# Patient Record
Sex: MALE | Race: OTHER | Employment: FULL TIME | URBAN - METROPOLITAN AREA
[De-identification: names, ages, dates, MRNs, and addresses within clinical notes are randomized per-mention and may not be internally consistent; named-entity substitution may affect disease eponyms.]

---

## 2017-10-18 ENCOUNTER — HOSPITAL ENCOUNTER (EMERGENCY)
Age: 42
Discharge: HOME | End: 2017-10-18
Attending: EMERGENCY MEDICINE
Payer: SELF-PAY

## 2017-10-18 VITALS
RESPIRATION RATE: 16 BRPM | HEIGHT: 73 IN | SYSTOLIC BLOOD PRESSURE: 127 MMHG | HEART RATE: 82 BPM | TEMPERATURE: 98.2 F | DIASTOLIC BLOOD PRESSURE: 83 MMHG | WEIGHT: 167.5 LBS | BODY MASS INDEX: 22.2 KG/M2 | OXYGEN SATURATION: 95 %

## 2017-10-18 DIAGNOSIS — F11.20 HEROIN ADDICTION (HCC): Primary | ICD-10-CM

## 2017-10-18 PROCEDURE — 90791 PSYCH DIAGNOSTIC EVALUATION: CPT

## 2017-10-18 PROCEDURE — 99283 EMERGENCY DEPT VISIT LOW MDM: CPT
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
## 2017-10-18 NOTE — DISCHARGE INSTRUCTIONS
Learning About Opioid Use Disorder  What is opioid use disorder? Opioids are strong pain medicines. Examples include hydrocodone, oxycodone, fentanyl, and morphine. Heroin is an example of an illegal opioid. Opioid use disorder is using these drugs in a way that keeps you from living the life you want. Your use is out of control, and it harms you and your relationships. Even if you don't see bad effects in your life, it can be dangerous to use opioids in a way that your doctor didn't prescribe. Taking too much of an opioid can cause:  · Trouble breathing. · Low blood pressure. · A low heart rate. · A coma. · Death. Some people develop problems with opioids after they get a prescription from a doctor. Others buy these drugs illegally. Many people with this disorder, and sometimes their families, feel embarrassed or ashamed. Don't let these feelings  the way of getting treatment. Remember that the disorder can happen to anyone who uses opioids, no matter what the reason. What are the symptoms? You may have opioid use disorder if two or more of the following are true:  · You use larger amounts of the drug than you ever meant to. Or you've been using it for a longer period of time than you ever meant to. · You can't cut down or control your use. Or you constantly wish you could cut down. · You spend a lot of time getting or using the drug, or recovering from the effects. · You have strong cravings for the drug. · You can no longer do your main jobs at work, at school, or at home. · You keep using even though your drug use hurts your relationships. · You have stopped doing important activities because of your drug use. · You use drugs in situations where doing so is dangerous. · You keep using the drug even though you know it is causing health problems. · You need more and more of the drug to get the same effect, or you get less effect from the same amount over time.  This is called tolerance. · You can't stop using the drug without having uncomfortable symptoms. This is called withdrawal.  How is opioid use disorder treated? Treatment usually includes medicines, group therapy, one or more types of counseling, and drug education. Sometimes medicines are used to help you quit. They may help to control cravings, ease withdrawal symptoms, and prevent relapse. This treatment is called medication-assisted treatment, or MAT. During MAT, you take a substitute drug (usually methadone or buprenorphine) in place of the opioid you were using. Most people take the medicine for months or years as a part of the treatment, along with therapy or counseling. Treatment focuses on more than drugs. It helps you cope with the anger, frustration, sadness, and disappointment that often happen when a person tries to stop using drugs. Treatment also looks at other parts of your life. For example, how are your relationships with friends and family? What's going on at school and work? Do you have health problems? What is your living situation? Treatment helps you find and manage problems. It helps you take control of your life so you don't have to depend on drugs. A drug problem affects your whole family. Family counseling often is part of treatment. Urgent treatment for an overdose  Naloxone is a medicine that reverses the effects of an overdose. If you take it or someone gives it to you soon enough after an overdose, it can save your life. Naloxone comes in a rescue kit you can carry with you. Ask your doctor or pharmacist about having a naloxone rescue kit on hand. Follow-up care is a key part of your treatment and safety. Be sure to make and go to all appointments, and call your doctor if you are having problems. It's also a good idea to know your test results and keep a list of the medicines you take. Where can you learn more? Go to http://sourav-abdi.info/.   Enter C648 in the search box to learn more about \"Learning About Opioid Use Disorder. \"  Current as of: February 3, 2017  Content Version: 11.3  © 1955-4763 [employer]. Care instructions adapted under license by [employer] (which disclaims liability or warranty for this information). If you have questions about a medical condition or this instruction, always ask your healthcare professional. [place] [address] any warranty or liability for your use of this information.

## 2017-10-18 NOTE — ED TRIAGE NOTES
I have been using Heroin for 6 months and I need detox. Denies suicidal /homicidal. Last use yesterday.

## 2019-06-08 ENCOUNTER — APPOINTMENT (OUTPATIENT)
Dept: GENERAL RADIOLOGY | Age: 44
End: 2019-06-08
Attending: PHYSICIAN ASSISTANT
Payer: COMMERCIAL

## 2019-06-08 ENCOUNTER — HOSPITAL ENCOUNTER (EMERGENCY)
Age: 44
Discharge: HOME | End: 2019-06-09
Attending: STUDENT IN AN ORGANIZED HEALTH CARE EDUCATION/TRAINING PROGRAM | Admitting: STUDENT IN AN ORGANIZED HEALTH CARE EDUCATION/TRAINING PROGRAM
Payer: COMMERCIAL

## 2019-06-08 DIAGNOSIS — S39.012A BACK STRAIN, INITIAL ENCOUNTER: Primary | ICD-10-CM

## 2019-06-08 PROCEDURE — 99282 EMERGENCY DEPT VISIT SF MDM: CPT

## 2019-06-08 PROCEDURE — 96372 THER/PROPH/DIAG INJ SC/IM: CPT

## 2019-06-08 PROCEDURE — 74011250636 HC RX REV CODE- 250/636: Performed by: PHYSICIAN ASSISTANT

## 2019-06-08 PROCEDURE — 72100 X-RAY EXAM L-S SPINE 2/3 VWS: CPT

## 2019-06-08 RX ORDER — KETOROLAC TROMETHAMINE 30 MG/ML
15 INJECTION, SOLUTION INTRAMUSCULAR; INTRAVENOUS
Status: COMPLETED | OUTPATIENT
Start: 2019-06-08 | End: 2019-06-08

## 2019-06-08 RX ORDER — DIAZEPAM 10 MG/2ML
2 INJECTION INTRAMUSCULAR
Status: COMPLETED | OUTPATIENT
Start: 2019-06-08 | End: 2019-06-08

## 2019-06-08 RX ADMIN — KETOROLAC TROMETHAMINE 15 MG: 30 INJECTION, SOLUTION INTRAMUSCULAR at 23:37

## 2019-06-08 RX ADMIN — Medication 2 MG: at 23:37

## 2019-06-08 NOTE — LETTER
MarleneAsmita Nathaly 53 
[address] [place] [id number] 
[phone] Work/School Note Date: 6/8/2019 To Whom It May concern: 
 
Roque Isaac was seen and treated today in the emergency room by the following provider(s): 
Attending Provider: Sena Valenzuela MD 
Physician Assistant: KAYLA Gonzalez. Roque Isaac may return to work on Wednesday; SOONER IF SYMPTOMS IMPROVE. Sincerely, Sheryle Maker, PA

## 2019-06-09 VITALS
OXYGEN SATURATION: 97 % | BODY MASS INDEX: 24.02 KG/M2 | SYSTOLIC BLOOD PRESSURE: 149 MMHG | WEIGHT: 181.22 LBS | HEIGHT: 73 IN | HEART RATE: 62 BPM | TEMPERATURE: 98.1 F | DIASTOLIC BLOOD PRESSURE: 89 MMHG | RESPIRATION RATE: 16 BRPM

## 2019-06-09 RX ORDER — DIAZEPAM 5 MG/1
5 TABLET ORAL
Qty: 15 TAB | Refills: 0 | Status: SHIPPED | OUTPATIENT
Start: 2019-06-09 | End: 2019-09-09

## 2019-06-09 RX ORDER — NAPROXEN 500 MG/1
500 TABLET ORAL
Qty: 20 TAB | Refills: 0 | Status: SHIPPED | OUTPATIENT
Start: 2019-06-09 | End: 2019-09-09

## 2019-06-09 NOTE — DISCHARGE INSTRUCTIONS
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
## 2019-07-09 ENCOUNTER — HOSPITAL ENCOUNTER (OUTPATIENT)
Dept: MRI IMAGING | Age: 44
Discharge: HOME | End: 2019-07-09
Attending: CHIROPRACTOR
Payer: COMMERCIAL

## 2019-07-09 DIAGNOSIS — M54.50 LOW BACK PAIN RADIATING TO LEFT LEG: ICD-10-CM

## 2019-07-09 DIAGNOSIS — M79.605 LOW BACK PAIN RADIATING TO LEFT LEG: ICD-10-CM

## 2019-07-09 PROCEDURE — 72148 MRI LUMBAR SPINE W/O DYE: CPT

## 2019-09-09 ENCOUNTER — HOSPITAL ENCOUNTER (OUTPATIENT)
Dept: PREADMISSION TESTING | Age: 44
Discharge: HOME | End: 2019-09-09
Payer: COMMERCIAL

## 2019-09-09 VITALS
RESPIRATION RATE: 20 BRPM | SYSTOLIC BLOOD PRESSURE: 177 MMHG | DIASTOLIC BLOOD PRESSURE: 111 MMHG | OXYGEN SATURATION: 96 % | TEMPERATURE: 97 F | WEIGHT: 183.5 LBS | HEIGHT: 73 IN | BODY MASS INDEX: 24.32 KG/M2 | HEART RATE: 77 BPM

## 2019-09-09 LAB
25(OH)D3 SERPL-MCNC: 30.4 NG/ML (ref 30–100)
ABO + RH BLD: NORMAL
ALBUMIN SERPL-MCNC: 3.8 G/DL (ref 3.5–5)
ALBUMIN/GLOB SERPL: 1.2 {RATIO} (ref 1.1–2.2)
ALP SERPL-CCNC: 98 U/L (ref 45–117)
ALT SERPL-CCNC: 21 U/L (ref 12–78)
ANION GAP SERPL CALC-SCNC: 1 MMOL/L (ref 5–15)
APPEARANCE UR: CLEAR
APTT PPP: 26.1 SEC (ref 22.1–32)
AST SERPL-CCNC: 13 U/L (ref 15–37)
ATRIAL RATE: 63 BPM
BACTERIA URNS QL MICRO: NEGATIVE /HPF
BASOPHILS # BLD: 0 K/UL (ref 0–0.1)
BASOPHILS NFR BLD: 0 % (ref 0–1)
BILIRUB SERPL-MCNC: 0.3 MG/DL (ref 0.2–1)
BILIRUB UR QL CFM: NEGATIVE
BLOOD GROUP ANTIBODIES SERPL: NORMAL
BUN SERPL-MCNC: 12 MG/DL (ref 6–20)
BUN/CREAT SERPL: 11 (ref 12–20)
CALCIUM SERPL-MCNC: 9.2 MG/DL (ref 8.5–10.1)
CALCULATED P AXIS, ECG09: 64 DEGREES
CALCULATED R AXIS, ECG10: 68 DEGREES
CALCULATED T AXIS, ECG11: 50 DEGREES
CHLORIDE SERPL-SCNC: 109 MMOL/L (ref 97–108)
CO2 SERPL-SCNC: 29 MMOL/L (ref 21–32)
COLOR UR: ABNORMAL
CREAT SERPL-MCNC: 1.1 MG/DL (ref 0.7–1.3)
DIAGNOSIS, 93000: NORMAL
DIFFERENTIAL METHOD BLD: NORMAL
EOSINOPHIL # BLD: 0.2 K/UL (ref 0–0.4)
EOSINOPHIL NFR BLD: 2 % (ref 0–7)
EPITH CASTS URNS QL MICRO: ABNORMAL /LPF
ERYTHROCYTE [DISTWIDTH] IN BLOOD BY AUTOMATED COUNT: 14.3 % (ref 11.5–14.5)
EST. AVERAGE GLUCOSE BLD GHB EST-MCNC: 111 MG/DL
GLOBULIN SER CALC-MCNC: 3.2 G/DL (ref 2–4)
GLUCOSE SERPL-MCNC: 72 MG/DL (ref 65–100)
GLUCOSE UR STRIP.AUTO-MCNC: NEGATIVE MG/DL
HBA1C MFR BLD: 5.5 % (ref 4.2–6.3)
HCT VFR BLD AUTO: 47.8 % (ref 36.6–50.3)
HGB BLD-MCNC: 15.9 G/DL (ref 12.1–17)
HGB UR QL STRIP: NEGATIVE
HYALINE CASTS URNS QL MICRO: ABNORMAL /LPF (ref 0–5)
IMM GRANULOCYTES # BLD AUTO: 0 K/UL (ref 0–0.04)
IMM GRANULOCYTES NFR BLD AUTO: 0 % (ref 0–0.5)
INR PPP: 1 (ref 0.9–1.1)
KETONES UR QL STRIP.AUTO: NEGATIVE MG/DL
LEUKOCYTE ESTERASE UR QL STRIP.AUTO: ABNORMAL
LYMPHOCYTES # BLD: 2.4 K/UL (ref 0.8–3.5)
LYMPHOCYTES NFR BLD: 32 % (ref 12–49)
MCH RBC QN AUTO: 29.2 PG (ref 26–34)
MCHC RBC AUTO-ENTMCNC: 33.3 G/DL (ref 30–36.5)
MCV RBC AUTO: 87.9 FL (ref 80–99)
MONOCYTES # BLD: 0.4 K/UL (ref 0–1)
MONOCYTES NFR BLD: 6 % (ref 5–13)
NEUTS SEG # BLD: 4.3 K/UL (ref 1.8–8)
NEUTS SEG NFR BLD: 60 % (ref 32–75)
NITRITE UR QL STRIP.AUTO: NEGATIVE
NRBC # BLD: 0 K/UL (ref 0–0.01)
NRBC BLD-RTO: 0 PER 100 WBC
P-R INTERVAL, ECG05: 144 MS
PH UR STRIP: 6 [PH] (ref 5–8)
PLATELET # BLD AUTO: 184 K/UL (ref 150–400)
PMV BLD AUTO: 11.8 FL (ref 8.9–12.9)
POTASSIUM SERPL-SCNC: 4.7 MMOL/L (ref 3.5–5.1)
PROT SERPL-MCNC: 7 G/DL (ref 6.4–8.2)
PROT UR STRIP-MCNC: NEGATIVE MG/DL
PROTHROMBIN TIME: 10.6 SEC (ref 9–11.1)
Q-T INTERVAL, ECG07: 450 MS
QRS DURATION, ECG06: 166 MS
QTC CALCULATION (BEZET), ECG08: 460 MS
RBC # BLD AUTO: 5.44 M/UL (ref 4.1–5.7)
RBC #/AREA URNS HPF: ABNORMAL /HPF (ref 0–5)
SODIUM SERPL-SCNC: 139 MMOL/L (ref 136–145)
SP GR UR REFRACTOMETRY: 1.03 (ref 1–1.03)
SPECIMEN EXP DATE BLD: NORMAL
THERAPEUTIC RANGE,PTTT: NORMAL SECS (ref 58–77)
UA: UC IF INDICATED,UAUC: ABNORMAL
UROBILINOGEN UR QL STRIP.AUTO: 1 EU/DL (ref 0.2–1)
VENTRICULAR RATE, ECG03: 63 BPM
WBC # BLD AUTO: 7.4 K/UL (ref 4.1–11.1)
WBC URNS QL MICRO: ABNORMAL /HPF (ref 0–4)

## 2019-09-09 PROCEDURE — 85025 COMPLETE CBC W/AUTO DIFF WBC: CPT

## 2019-09-09 PROCEDURE — 85610 PROTHROMBIN TIME: CPT

## 2019-09-09 PROCEDURE — 81001 URINALYSIS AUTO W/SCOPE: CPT

## 2019-09-09 PROCEDURE — 86900 BLOOD TYPING SEROLOGIC ABO: CPT

## 2019-09-09 PROCEDURE — 80053 COMPREHEN METABOLIC PANEL: CPT

## 2019-09-09 PROCEDURE — 93005 ELECTROCARDIOGRAM TRACING: CPT

## 2019-09-09 PROCEDURE — 85730 THROMBOPLASTIN TIME PARTIAL: CPT

## 2019-09-09 PROCEDURE — 83036 HEMOGLOBIN GLYCOSYLATED A1C: CPT

## 2019-09-09 PROCEDURE — 87086 URINE CULTURE/COLONY COUNT: CPT

## 2019-09-09 PROCEDURE — 82306 VITAMIN D 25 HYDROXY: CPT

## 2019-09-09 PROCEDURE — 36415 COLL VENOUS BLD VENIPUNCTURE: CPT

## 2019-09-09 RX ORDER — MELOXICAM 15 MG/1
15 TABLET ORAL DAILY
COMMUNITY
End: 2019-09-10

## 2019-09-09 RX ORDER — GABAPENTIN 300 MG/1
300 CAPSULE ORAL DAILY
COMMUNITY

## 2019-09-09 RX ORDER — GABAPENTIN 300 MG/1
600 CAPSULE ORAL
COMMUNITY

## 2019-09-09 RX ORDER — CHOLECALCIFEROL (VITAMIN D3) 125 MCG
440 CAPSULE ORAL
COMMUNITY
End: 2019-09-10

## 2019-09-09 NOTE — PERIOP NOTES
At PAT appt bp #1 @ 1110 am-171/111 hr 77, #2 156/102 hr 76. Repeated x3 @ 1130. bp 155/97 hr 75. bp #4 per pt 's request at completion of /107 @ 12noon. Advised pt to go to ED today or call PCP for appt re: elevated bp ASAP. Pt states pcp out of town until 9/13/19. He tried calling office for a rx for chantix for smoke cessation. Advise pt to go to a patient first. Says he was unable to pay another bill. Informed Jannie (supv) re: interaction and elevated bp x4. Was able get an appt with Dr Crystal Royal on 9/10/19 @ 2:30 pm for bp eval. Information provided to patient prior to leaving [facility]. Pt agrees to appt for 9/10.  DOS 9/17/19

## 2019-09-09 NOTE — PERIOP NOTES
[address], [address] [facility]   MAIN OR                                  [phone]   MAIN PRE OP                          [phone]                                                                                AMBULATORY PRE OP          [phone]  PRE-ADMISSION TESTING    [phone]   Surgery Date:   Tuesday 9/17/19          Is surgery arrival time given by surgeon? NO  If [facility] staff will call you Monday 9/16/19  between 3 and 7pm the day before your surgery with your arrival time. (If your surgery is on a Monday, we will call you the Friday before.)    Call [phone] after 7pm Monday-Friday if you did not receive your arrival time. INSTRUCTIONS BEFORE YOUR SURGERY   When You  Arrive Arrive at the [address]nd [address] [facility] on the day of your surgery  Have your insurance card, photo ID, and any copayment (if needed)   Food   and   Drink NO food or drink after midnight the night before surgery    This means NO water, gum, mints, coffee, juice, etc.  No alcohol (beer, wine, liquor) 24 hours before and after surgery   Medications to   TAKE   Morning of Surgery MEDICATIONS TO TAKE THE MORNING OF SURGERY WITH A SIP OF WATER:    gabapentin   Medications  To  STOP      7 days before surgery  Non-Steroidal anti-inflammatory Drugs (NSAID's): for example, Ibuprofen (Advil, Motrin), Naproxen (Aleve)   Aspirin, if taking for pain    Herbal supplements, vitamins, and fish oil   Other: mobic     Blood  Thinners  If you take  Aspirin, Plavix, Coumadin, or any blood-thinning or anti-blood clot medicine, talk to the doctor who prescribed the medications for pre-operative instructions.    Bathing Clothing  Jewelry  Valuables       If you shower the morning of surgery, please do not apply anything to your skin (lotions, powders, deodorant)   Follow all special bath instructions (for spine surgeries)   Do not shave or trim anywhere 24 hours before surgery   Wear your hair loose or down; no pony-tails, buns, or metal hair clips   Wear loose, comfortable, clean clothes   Wear glasses instead of contacts   Leave money, valuables, and jewelry, including body piercings, at home   Going Home - or Spending the Night  SAME-DAY SURGERY: You must have a responsible adult drive you home and stay with you 24 hours after surgery   ADMITS: If your doctor is keeping you in the hospital after surgery, leave personal belongings/luggage in your car until you have a hospital room number. Hospital discharge time is 12 noon  Drivers must be here before 12 noon unless you are told differently   Special Instructions Special Instructions:  · Use Chlorhexidine Care Fusion wash and sponges 3 days prior to surgery as instructed. · Incentive spirometer given with instructions to practice at home and bring back to the hospital on the day of surgery. · [facility] will contact you if your Hemoglobin A1C is greater than 7.5. · Pain pamphlet and Call Don't Fall reminder reviewed with patient. ·  parking is complimentary Monday - Friday 7 am - 5 pm  · Bring PTA Medication list day of surgery with the last doses taken documented   · Do not bring medication bottles the day of surgery     Follow all instructions so your surgery wont be cancelled. Please, be on time. If a situation occurs and you are delayed the day of surgery, call [phone] or [phone]. If your physical condition changes (like a fever, cold, flu, etc.) call your surgeon. The patient was contacted  in person. Home medication reviewed and verified during PAT appointment. The patient verbalizes understanding of all instructions and does not  need reinforcement.

## 2019-09-10 ENCOUNTER — HOSPITAL ENCOUNTER (OUTPATIENT)
Dept: GENERAL RADIOLOGY | Age: 44
Discharge: HOME | End: 2019-09-10
Payer: COMMERCIAL

## 2019-09-10 ENCOUNTER — OFFICE VISIT (OUTPATIENT)
Dept: FAMILY MEDICINE CLINIC | Age: 44
End: 2019-09-10

## 2019-09-10 VITALS
SYSTOLIC BLOOD PRESSURE: 162 MMHG | HEIGHT: 73 IN | BODY MASS INDEX: 24.27 KG/M2 | DIASTOLIC BLOOD PRESSURE: 104 MMHG | WEIGHT: 183.1 LBS | OXYGEN SATURATION: 100 % | TEMPERATURE: 98 F | HEART RATE: 82 BPM | RESPIRATION RATE: 18 BRPM

## 2019-09-10 DIAGNOSIS — I10 ACCELERATED HYPERTENSION: ICD-10-CM

## 2019-09-10 DIAGNOSIS — M54.5 CHRONIC LOW BACK PAIN, UNSPECIFIED BACK PAIN LATERALITY, WITH SCIATICA PRESENCE UNSPECIFIED: ICD-10-CM

## 2019-09-10 DIAGNOSIS — R94.31 ABNORMAL EKG: ICD-10-CM

## 2019-09-10 DIAGNOSIS — R05.3 CHRONIC COUGH: ICD-10-CM

## 2019-09-10 DIAGNOSIS — M51.26 LUMBAR DISC HERNIATION: ICD-10-CM

## 2019-09-10 DIAGNOSIS — Z72.0 TOBACCO ABUSE: ICD-10-CM

## 2019-09-10 DIAGNOSIS — Z01.818 PREOPERATIVE EVALUATION TO RULE OUT SURGICAL CONTRAINDICATION: Primary | ICD-10-CM

## 2019-09-10 DIAGNOSIS — G89.29 CHRONIC LOW BACK PAIN, UNSPECIFIED BACK PAIN LATERALITY, WITH SCIATICA PRESENCE UNSPECIFIED: ICD-10-CM

## 2019-09-10 DIAGNOSIS — J43.9 PULMONARY EMPHYSEMA, UNSPECIFIED EMPHYSEMA TYPE (HCC): ICD-10-CM

## 2019-09-10 DIAGNOSIS — E05.00 GRAVES DISEASE: ICD-10-CM

## 2019-09-10 LAB
BACTERIA SPEC CULT: NORMAL
CC UR VC: NORMAL
SERVICE CMNT-IMP: NORMAL

## 2019-09-10 PROCEDURE — 71046 X-RAY EXAM CHEST 2 VIEWS: CPT

## 2019-09-10 NOTE — Clinical Note
MAXIM--Dr Frank Salgado your patient was seen for a preop eval today and requires further optimization and workup before surgeryDr Kev--you were previously this patient's PCP and he intends to continue to follow with you based on my visitPlease contact me with any concernsSnow Guthrie [facility]09/10/19 5:08 PM

## 2019-09-10 NOTE — PROGRESS NOTES
Chief Complaint   Patient presents with    Pre-op Exam     1. Have you been to the ER, urgent care clinic since your last visit? Hospitalized since your last visit? No    2. Have you seen or consulted any other health care providers outside of the [address] [place] [address] since your last visit? Include any pap smears or colon screening.  No
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
## 2019-09-11 LAB — TSH SERPL DL<=0.005 MIU/L-ACNC: 0.55 UIU/ML (ref 0.45–4.5)

## 2019-09-11 NOTE — PROGRESS NOTES
Called and spoke with pt, and he has been advised and states understanding of results and agrees with plan. Pt agrees to follow up with Dr. Andie Bennett.

## 2019-09-11 NOTE — PROGRESS NOTES
Findings on xray show bullous disease which is most likely related to smoking and emphysema. No acute process causing change in cough.  Again, please having patient follow up with his PCP, Dr Addi Ramirez

## 2019-09-12 ENCOUNTER — HOSPITAL ENCOUNTER (OUTPATIENT)
Dept: NON INVASIVE DIAGNOSTICS | Age: 44
Discharge: HOME | End: 2019-09-12
Attending: FAMILY MEDICINE
Payer: COMMERCIAL

## 2019-09-12 ENCOUNTER — TELEPHONE (OUTPATIENT)
Dept: FAMILY MEDICINE CLINIC | Age: 44
End: 2019-09-12

## 2019-09-12 VITALS
WEIGHT: 182.98 LBS | BODY MASS INDEX: 24.25 KG/M2 | HEIGHT: 73 IN | SYSTOLIC BLOOD PRESSURE: 166 MMHG | DIASTOLIC BLOOD PRESSURE: 104 MMHG

## 2019-09-12 DIAGNOSIS — R94.31 ABNORMAL EKG: ICD-10-CM

## 2019-09-12 LAB
BACTERIA SPEC CULT: ABNORMAL
BACTERIA SPEC CULT: ABNORMAL
ECHO AO ROOT DIAM: 4.49 CM
ECHO AV AREA PEAK VELOCITY: 3.5 CM2
ECHO AV AREA/BSA PEAK VELOCITY: 1.7 CM2/M2
ECHO AV PEAK GRADIENT: 2.9 MMHG
ECHO AV PEAK VELOCITY: 85.09 CM/S
ECHO EST RA PRESSURE: 3 MMHG
ECHO LA AREA 4C: 16.6 CM2
ECHO LA MAJOR AXIS: 3.18 CM
ECHO LA TO AORTIC ROOT RATIO: 0.71
ECHO LA VOL 2C: 56.34 ML (ref 18–58)
ECHO LA VOL 4C: 39.39 ML (ref 18–58)
ECHO LA VOL BP: 50.3 ML (ref 18–58)
ECHO LA VOL/BSA BIPLANE: 24.28 ML/M2 (ref 16–28)
ECHO LA VOLUME INDEX A2C: 27.19 ML/M2 (ref 16–28)
ECHO LA VOLUME INDEX A4C: 19.01 ML/M2 (ref 16–28)
ECHO LV E' LATERAL VELOCITY: 5.07 CENTIMETER/SECOND
ECHO LV E' SEPTAL VELOCITY: 7 CENTIMETER/SECOND
ECHO LV EDV A2C: 94.1 ML
ECHO LV EDV A4C: 123.3 ML
ECHO LV EDV BP: 112.7 ML (ref 67–155)
ECHO LV EDV INDEX A4C: 59.5 ML/M2
ECHO LV EDV INDEX BP: 54.4 ML/M2
ECHO LV EDV NDEX A2C: 45.4 ML/M2
ECHO LV EJECTION FRACTION A2C: 58 %
ECHO LV EJECTION FRACTION A4C: 41 %
ECHO LV EJECTION FRACTION BIPLANE: 47.2 % (ref 55–100)
ECHO LV ESV A2C: 39.9 ML
ECHO LV ESV A4C: 72.4 ML
ECHO LV ESV BP: 59.5 ML (ref 22–58)
ECHO LV ESV INDEX A2C: 19.3 ML/M2
ECHO LV ESV INDEX A4C: 34.9 ML/M2
ECHO LV ESV INDEX BP: 28.7 ML/M2
ECHO LV INTERNAL DIMENSION DIASTOLIC: 5.74 CM (ref 4.2–5.9)
ECHO LV INTERNAL DIMENSION SYSTOLIC: 4.7 CM
ECHO LV IVSD: 1.02 CM (ref 0.6–1)
ECHO LV MASS 2D: 204.7 G (ref 88–224)
ECHO LV MASS INDEX 2D: 98.8 G/M2 (ref 49–115)
ECHO LV POSTERIOR WALL DIASTOLIC: 0.6 CM (ref 0.6–1)
ECHO LVOT DIAM: 2.46 CM
ECHO LVOT PEAK GRADIENT: 1.6 MMHG
ECHO LVOT PEAK VELOCITY: 62.7 CM/S
ECHO MV A VELOCITY: 50.19 CM/S
ECHO MV AREA PHT: 5.9 CM2
ECHO MV E DECELERATION TIME (DT): 128.3 MS
ECHO MV E VELOCITY: 23.32 CM/S
ECHO MV E/A RATIO: 0.46
ECHO MV PRESSURE HALF TIME (PHT): 37.2 MS
ECHO MV REGURGITANT PEAK GRADIENT: 116 MMHG
ECHO MV REGURGITANT PEAK VELOCITY: 538.4 CM/S
ECHO PULMONARY ARTERY SYSTOLIC PRESSURE (PASP): 26.4 MMHG
ECHO PV MAX VELOCITY: 76.57 CM/S
ECHO PV PEAK GRADIENT: 2.3 MMHG
ECHO RIGHT VENTRICULAR SYSTOLIC PRESSURE (RVSP): 26.4 MMHG
ECHO RV INTERNAL DIMENSION: 4.38 CM
ECHO RV TAPSE: 1.78 CM (ref 1.5–2)
ECHO TV REGURGITANT MAX VELOCITY: 241.77 CM/S
ECHO TV REGURGITANT PEAK GRADIENT: 23.4 MMHG
SERVICE CMNT-IMP: ABNORMAL

## 2019-09-12 PROCEDURE — 93306 TTE W/DOPPLER COMPLETE: CPT

## 2019-09-12 NOTE — PROGRESS NOTES
Please notify the patient that I did review his echocardiogram.  He has diminished filling function and diminished pumping function in his heart, both are mild. He has a mildly dilated aortic root, he has no pulmonary hypertension and just trace mitral regurg which is not concerning  I believe that he should again follow-up with his primary care doctor, Dr. Nely Lynn.

## 2019-09-12 NOTE — TELEPHONE ENCOUNTER
----- Message from Cj Donato MD sent at 9/12/2019 12:21 PM EDT -----  Please notify the patient that I did review his echocardiogram.  He has diminished filling function and diminished pumping function in his heart, both are mild. He has a mildly dilated aortic root, he has no pulmonary hypertension and just trace mitral regurg which is not concerning  I believe that he should again follow-up with his primary care doctor, Dr. Noé Saul.

## 2019-09-12 NOTE — PERIOP NOTES
[facility] office aware of BP issues per Dr. Augustine Siddiqui. +MSSA nasal swab verified by calling lab - final result. Result faxed to Dr. Dayanara Khan office via EMR and hard copy with treatment plan and reminder that NP not in PAT this week and will need to be treated by their office.

## 2019-09-12 NOTE — TELEPHONE ENCOUNTER
----- Message from Di Hawk MD sent at 9/11/2019 12:14 PM EDT -----  Please notify pt his thyroid test was normal--again recommend f/u with Dr Migue Judge, his PCP

## 2019-09-12 NOTE — TELEPHONE ENCOUNTER
Pt returned call, was advised of results and agrees to plan, but has concerns because he just left Dr. Villalobos Cable office, bp was 160/104 and 150/90, was advised if bp is ok on Monday he can get surgery, noting he has to purchase home bp cuff to record his readings, and will  today. Pt advised his doctor will clear him for surgery if bp is ok and surgeon's office will be notified. Pt agrees to plan.  Trell

## 2019-09-16 NOTE — PERIOP NOTES
Call placed to Dr. Emma Rocha office, patient does not currently have an appointment scheduled at their office today for BP follow up. LM for patient to verify BP follow up appointment for today. Received a call from patient who states that he purchased a home BP cuff and that his last reading was 155/112, patient instructed to contact PCP to schedule follow up appointment for today.

## 2019-09-16 NOTE — PERIOP NOTES
Called the office of Dr. Courtney Cox, the patient's PCP, requesting a copy of today's OV note be faxed to the Main pre-op. Left a VM for Nathan Buckley nurse requesting this. Left a VM for Amanda Haney at Dr. Marley Garcia office informing her of the patient's continued issues with his BP, and the attempt to obtain the last OV note.   DOS: 9/17/2019

## 2019-09-16 NOTE — PERIOP NOTES
Called the patient's PCP office at [phone] inquiring if the patient called to make a follow up appointment for a BP recheck. Per the , he has an appointment today at 1430.   DOS: 9/17/2019

## 2019-10-01 NOTE — PERIOP NOTES
Reinaldo Santiago from Dr. Zenaida Marr office called. No PAT appt needed unless H&P is more than 30 days from DOS. I called Dr. Angelita Gray office to request last OVN to follow up on BP control. H&P will be in date for DOS    Notes received from Dr. Aravind Kingsley for BP follow up and physical. LM at Dr. Zenaida Marr office to notify them we have necessary documents.

## 2019-10-01 NOTE — PERIOP NOTES
Spoke with patient. Has CHG wash for 3 nights prior to surgery. Has not been treated for MSSA in nares.  I will follow up with NP.

## 2019-10-04 ENCOUNTER — ANESTHESIA EVENT (OUTPATIENT)
Dept: SURGERY | Age: 44
End: 2019-10-04
Payer: COMMERCIAL

## 2019-10-04 RX ORDER — MUPIROCIN 20 MG/G
OINTMENT TOPICAL 2 TIMES DAILY
Qty: 22 G | Refills: 0 | Status: SHIPPED | OUTPATIENT
Start: 2019-10-04 | End: 2019-10-09

## 2019-10-04 NOTE — PROGRESS NOTES
Notification of Positive MSSA and Treatment Ordered by Preadmission Testing Nurse Practitioner      Patient Name:  Vikas Dave  MRN: [id number]  : 1975    Surgeon: Esteban Farias  Date of surgery: 10/7/19  Allergies: No Known Allergies    MRSA results: All Micro Results     Procedure Component Value Units Date/Time    MRSA CULTURE NARES [[id number]]  (Abnormal) Collected:  19 1200    Order Status:  Completed Specimen:  Nares Updated:  19 1809     Special Requests: NO SPECIAL REQUESTS        Culture result:       MRSA NOT PRESENT. Apparent Staphylococus aureus (not MRSA noted). Screening of patient nares for MRSA is for surveillance purposes and, if positive, to facilitate isolation considerations in high risk settings. It is not intended for automatic decolonization interventions per se as regimens are not sufficiently effective to warrant routine use.           CULTURE, URINE [[id number]] Collected:  19 1200    Order Status:  Completed Specimen:  Urine Updated:  09/10/19 5947     Special Requests: --        NO SPECIAL REQUESTS  Reflexed from [id number]       [place] Count <1,000 CFU/ML        Culture result: NO GROWTH 1 DAY             Treatment ordered: Bactroban ointment 2%- apply intranasal twice daily for 5 days    Date patient notified of results / treatment prescribed: 10/4/19 via phone call    The patient was instructed to add medication and date they began treatment to their \"Prior to Admission Medication\" list given to them in [address], [place]

## 2019-10-07 ENCOUNTER — HOSPITAL ENCOUNTER (OUTPATIENT)
Age: 44
Setting detail: OUTPATIENT SURGERY
Discharge: HOME | End: 2019-10-07
Attending: ORTHOPAEDIC SURGERY | Admitting: ORTHOPAEDIC SURGERY
Payer: COMMERCIAL

## 2019-10-07 ENCOUNTER — APPOINTMENT (OUTPATIENT)
Dept: GENERAL RADIOLOGY | Age: 44
End: 2019-10-07
Attending: ORTHOPAEDIC SURGERY
Payer: COMMERCIAL

## 2019-10-07 ENCOUNTER — ANESTHESIA (OUTPATIENT)
Dept: SURGERY | Age: 44
End: 2019-10-07
Payer: COMMERCIAL

## 2019-10-07 VITALS
BODY MASS INDEX: 24.25 KG/M2 | OXYGEN SATURATION: 94 % | DIASTOLIC BLOOD PRESSURE: 79 MMHG | WEIGHT: 182.98 LBS | RESPIRATION RATE: 11 BRPM | SYSTOLIC BLOOD PRESSURE: 115 MMHG | HEART RATE: 75 BPM | TEMPERATURE: 98 F | HEIGHT: 73 IN

## 2019-10-07 PROCEDURE — 77030013708 HC HNDPC SUC IRR PULS STRY –B: Performed by: ORTHOPAEDIC SURGERY

## 2019-10-07 PROCEDURE — 74011250636 HC RX REV CODE- 250/636: Performed by: ORTHOPAEDIC SURGERY

## 2019-10-07 PROCEDURE — 86900 BLOOD TYPING SEROLOGIC ABO: CPT

## 2019-10-07 PROCEDURE — 77030026438 HC STYL ET INTUB CARD -A: Performed by: ANESTHESIOLOGY

## 2019-10-07 PROCEDURE — 74011000250 HC RX REV CODE- 250: Performed by: ORTHOPAEDIC SURGERY

## 2019-10-07 PROCEDURE — 77030019908 HC STETH ESOPH SIMS -A: Performed by: ANESTHESIOLOGY

## 2019-10-07 PROCEDURE — 77030018836 HC SOL IRR NACL ICUM -A: Performed by: ORTHOPAEDIC SURGERY

## 2019-10-07 PROCEDURE — 76060000034 HC ANESTHESIA 1.5 TO 2 HR: Performed by: ORTHOPAEDIC SURGERY

## 2019-10-07 PROCEDURE — 77030031139 HC SUT VCRL2 J&J -A: Performed by: ORTHOPAEDIC SURGERY

## 2019-10-07 PROCEDURE — 74011250636 HC RX REV CODE- 250/636: Performed by: NURSE ANESTHETIST, CERTIFIED REGISTERED

## 2019-10-07 PROCEDURE — 77030014647 HC SEAL FBRN TISSL BAXT -D: Performed by: ORTHOPAEDIC SURGERY

## 2019-10-07 PROCEDURE — 77030040356 HC CORD BPLR FRCP COVD -A: Performed by: ORTHOPAEDIC SURGERY

## 2019-10-07 PROCEDURE — 77030002933 HC SUT MCRYL J&J -A: Performed by: ORTHOPAEDIC SURGERY

## 2019-10-07 PROCEDURE — 77030004391 HC BUR FLUT MEDT -C: Performed by: ORTHOPAEDIC SURGERY

## 2019-10-07 PROCEDURE — 76010000162 HC OR TIME 1.5 TO 2 HR INTENSV-TIER 1: Performed by: ORTHOPAEDIC SURGERY

## 2019-10-07 PROCEDURE — 77030037134 HC WRAP COMPR BACK THER SOLM -B

## 2019-10-07 PROCEDURE — 77030040922 HC BLNKT HYPOTHRM STRY -A

## 2019-10-07 PROCEDURE — 77030003666 HC NDL SPINAL BD -A: Performed by: ORTHOPAEDIC SURGERY

## 2019-10-07 PROCEDURE — 77030018723 HC ELCTRD BLD COVD -A: Performed by: ORTHOPAEDIC SURGERY

## 2019-10-07 PROCEDURE — 74011000272 HC RX REV CODE- 272: Performed by: ORTHOPAEDIC SURGERY

## 2019-10-07 PROCEDURE — 76210000016 HC OR PH I REC 1 TO 1.5 HR: Performed by: ORTHOPAEDIC SURGERY

## 2019-10-07 PROCEDURE — 77030008684 HC TU ET CUF COVD -B: Performed by: ANESTHESIOLOGY

## 2019-10-07 PROCEDURE — 77030039266 HC ADH SKN EXOFIN S2SG -A: Performed by: ORTHOPAEDIC SURGERY

## 2019-10-07 PROCEDURE — 77030013079 HC BLNKT BAIR HGGR 3M -A: Performed by: ANESTHESIOLOGY

## 2019-10-07 PROCEDURE — 76210000020 HC REC RM PH II FIRST 0.5 HR: Performed by: ORTHOPAEDIC SURGERY

## 2019-10-07 PROCEDURE — 77030040361 HC SLV COMPR DVT MDII -B

## 2019-10-07 PROCEDURE — 77030003161 HC GRFT DURA MTRX INLC -E: Performed by: ORTHOPAEDIC SURGERY

## 2019-10-07 PROCEDURE — 36415 COLL VENOUS BLD VENIPUNCTURE: CPT

## 2019-10-07 PROCEDURE — 74011000250 HC RX REV CODE- 250: Performed by: NURSE ANESTHETIST, CERTIFIED REGISTERED

## 2019-10-07 PROCEDURE — 77030012935 HC DRSG AQUACEL BMS -B: Performed by: ORTHOPAEDIC SURGERY

## 2019-10-07 PROCEDURE — 74011250636 HC RX REV CODE- 250/636: Performed by: ANESTHESIOLOGY

## 2019-10-07 PROCEDURE — 77030002982 HC SUT POLYSRB J&J -A: Performed by: ORTHOPAEDIC SURGERY

## 2019-10-07 DEVICE — DURAGEN® SUTURABLE DURAL REGENERATION MATRIX, 2 IN X 2 IN (5 CM X 5 CM)
Type: IMPLANTABLE DEVICE | Site: SPINE LUMBAR | Status: FUNCTIONAL
Brand: DURAGEN® SUTURABLE

## 2019-10-07 RX ORDER — SODIUM CHLORIDE, SODIUM LACTATE, POTASSIUM CHLORIDE, CALCIUM CHLORIDE 600; 310; 30; 20 MG/100ML; MG/100ML; MG/100ML; MG/100ML
125 INJECTION, SOLUTION INTRAVENOUS CONTINUOUS
Status: DISCONTINUED | OUTPATIENT
Start: 2019-10-07 | End: 2019-10-07 | Stop reason: HOSPADM

## 2019-10-07 RX ORDER — MIDAZOLAM HYDROCHLORIDE 1 MG/ML
INJECTION, SOLUTION INTRAMUSCULAR; INTRAVENOUS AS NEEDED
Status: DISCONTINUED | OUTPATIENT
Start: 2019-10-07 | End: 2019-10-07 | Stop reason: HOSPADM

## 2019-10-07 RX ORDER — BUPROPION HYDROCHLORIDE 150 MG/1
TABLET ORAL
Refills: 1 | COMMUNITY
Start: 2019-09-16

## 2019-10-07 RX ORDER — FENTANYL CITRATE 50 UG/ML
INJECTION, SOLUTION INTRAMUSCULAR; INTRAVENOUS AS NEEDED
Status: DISCONTINUED | OUTPATIENT
Start: 2019-10-07 | End: 2019-10-07 | Stop reason: HOSPADM

## 2019-10-07 RX ORDER — VANCOMYCIN HYDROCHLORIDE 1 G/20ML
INJECTION, POWDER, LYOPHILIZED, FOR SOLUTION INTRAVENOUS AS NEEDED
Status: DISCONTINUED | OUTPATIENT
Start: 2019-10-07 | End: 2019-10-07 | Stop reason: HOSPADM

## 2019-10-07 RX ORDER — KETOROLAC TROMETHAMINE 30 MG/ML
INJECTION, SOLUTION INTRAMUSCULAR; INTRAVENOUS AS NEEDED
Status: DISCONTINUED | OUTPATIENT
Start: 2019-10-07 | End: 2019-10-07 | Stop reason: HOSPADM

## 2019-10-07 RX ORDER — KETAMINE HYDROCHLORIDE 10 MG/ML
INJECTION, SOLUTION INTRAMUSCULAR; INTRAVENOUS AS NEEDED
Status: DISCONTINUED | OUTPATIENT
Start: 2019-10-07 | End: 2019-10-07 | Stop reason: HOSPADM

## 2019-10-07 RX ORDER — PROPOFOL 10 MG/ML
INJECTION, EMULSION INTRAVENOUS AS NEEDED
Status: DISCONTINUED | OUTPATIENT
Start: 2019-10-07 | End: 2019-10-07 | Stop reason: HOSPADM

## 2019-10-07 RX ORDER — BUPIVACAINE HYDROCHLORIDE AND EPINEPHRINE 5; 5 MG/ML; UG/ML
INJECTION, SOLUTION EPIDURAL; INTRACAUDAL; PERINEURAL AS NEEDED
Status: DISCONTINUED | OUTPATIENT
Start: 2019-10-07 | End: 2019-10-07 | Stop reason: HOSPADM

## 2019-10-07 RX ORDER — TRIAMCINOLONE ACETONIDE 40 MG/ML
INJECTION, SUSPENSION INTRA-ARTICULAR; INTRAMUSCULAR AS NEEDED
Status: DISCONTINUED | OUTPATIENT
Start: 2019-10-07 | End: 2019-10-07 | Stop reason: HOSPADM

## 2019-10-07 RX ORDER — ONDANSETRON 2 MG/ML
INJECTION INTRAMUSCULAR; INTRAVENOUS AS NEEDED
Status: DISCONTINUED | OUTPATIENT
Start: 2019-10-07 | End: 2019-10-07 | Stop reason: HOSPADM

## 2019-10-07 RX ORDER — GLYCOPYRROLATE 0.2 MG/ML
INJECTION INTRAMUSCULAR; INTRAVENOUS AS NEEDED
Status: DISCONTINUED | OUTPATIENT
Start: 2019-10-07 | End: 2019-10-07 | Stop reason: HOSPADM

## 2019-10-07 RX ORDER — DEXAMETHASONE SODIUM PHOSPHATE 4 MG/ML
INJECTION, SOLUTION INTRA-ARTICULAR; INTRALESIONAL; INTRAMUSCULAR; INTRAVENOUS; SOFT TISSUE AS NEEDED
Status: DISCONTINUED | OUTPATIENT
Start: 2019-10-07 | End: 2019-10-07 | Stop reason: HOSPADM

## 2019-10-07 RX ORDER — ONDANSETRON 2 MG/ML
4 INJECTION INTRAMUSCULAR; INTRAVENOUS AS NEEDED
Status: DISCONTINUED | OUTPATIENT
Start: 2019-10-07 | End: 2019-10-07 | Stop reason: HOSPADM

## 2019-10-07 RX ORDER — LIDOCAINE HYDROCHLORIDE 10 MG/ML
0.1 INJECTION, SOLUTION EPIDURAL; INFILTRATION; INTRACAUDAL; PERINEURAL AS NEEDED
Status: DISCONTINUED | OUTPATIENT
Start: 2019-10-07 | End: 2019-10-07 | Stop reason: HOSPADM

## 2019-10-07 RX ORDER — LISINOPRIL 40 MG/1
TABLET ORAL
Refills: 0 | COMMUNITY
Start: 2019-09-23

## 2019-10-07 RX ORDER — LIDOCAINE HYDROCHLORIDE 20 MG/ML
INJECTION, SOLUTION EPIDURAL; INFILTRATION; INTRACAUDAL; PERINEURAL AS NEEDED
Status: DISCONTINUED | OUTPATIENT
Start: 2019-10-07 | End: 2019-10-07 | Stop reason: HOSPADM

## 2019-10-07 RX ORDER — ROCURONIUM BROMIDE 10 MG/ML
INJECTION, SOLUTION INTRAVENOUS AS NEEDED
Status: DISCONTINUED | OUTPATIENT
Start: 2019-10-07 | End: 2019-10-07 | Stop reason: HOSPADM

## 2019-10-07 RX ORDER — HYDROMORPHONE HYDROCHLORIDE 1 MG/ML
.5-1 INJECTION, SOLUTION INTRAMUSCULAR; INTRAVENOUS; SUBCUTANEOUS
Status: DISCONTINUED | OUTPATIENT
Start: 2019-10-07 | End: 2019-10-07 | Stop reason: HOSPADM

## 2019-10-07 RX ORDER — NEOSTIGMINE METHYLSULFATE 1 MG/ML
INJECTION INTRAVENOUS AS NEEDED
Status: DISCONTINUED | OUTPATIENT
Start: 2019-10-07 | End: 2019-10-07 | Stop reason: HOSPADM

## 2019-10-07 RX ADMIN — PROPOFOL 40 MG: 10 INJECTION, EMULSION INTRAVENOUS at 09:40

## 2019-10-07 RX ADMIN — FENTANYL CITRATE 50 MCG: 50 INJECTION, SOLUTION INTRAMUSCULAR; INTRAVENOUS at 09:21

## 2019-10-07 RX ADMIN — PHENYLEPHRINE HYDROCHLORIDE 40 MCG: 10 INJECTION INTRAVENOUS at 10:02

## 2019-10-07 RX ADMIN — LIDOCAINE HYDROCHLORIDE 80 MG: 20 INJECTION, SOLUTION INTRAVENOUS at 08:39

## 2019-10-07 RX ADMIN — PHENYLEPHRINE HYDROCHLORIDE 40 MCG: 10 INJECTION INTRAVENOUS at 09:09

## 2019-10-07 RX ADMIN — PHENYLEPHRINE HYDROCHLORIDE 40 MCG: 10 INJECTION INTRAVENOUS at 09:47

## 2019-10-07 RX ADMIN — ONDANSETRON 4 MG: 2 INJECTION INTRAMUSCULAR; INTRAVENOUS at 09:47

## 2019-10-07 RX ADMIN — KETAMINE HYDROCHLORIDE 20 MG: 10 INJECTION INTRAMUSCULAR; INTRAVENOUS at 09:08

## 2019-10-07 RX ADMIN — GLYCOPYRROLATE 0.4 MG: 0.2 INJECTION, SOLUTION INTRAMUSCULAR; INTRAVENOUS at 09:55

## 2019-10-07 RX ADMIN — ROCURONIUM BROMIDE 5 MG: 50 INJECTION, SOLUTION INTRAVENOUS at 08:39

## 2019-10-07 RX ADMIN — KETOROLAC TROMETHAMINE 30 MG: 30 INJECTION INTRAMUSCULAR; INTRAVENOUS at 09:47

## 2019-10-07 RX ADMIN — FENTANYL CITRATE 50 MCG: 50 INJECTION, SOLUTION INTRAMUSCULAR; INTRAVENOUS at 08:45

## 2019-10-07 RX ADMIN — WATER 2 G: 1 INJECTION INTRAMUSCULAR; INTRAVENOUS; SUBCUTANEOUS at 09:00

## 2019-10-07 RX ADMIN — MIDAZOLAM 3 MG: 1 INJECTION INTRAMUSCULAR; INTRAVENOUS at 08:33

## 2019-10-07 RX ADMIN — DEXAMETHASONE SODIUM PHOSPHATE 8 MG: 4 INJECTION, SOLUTION INTRAMUSCULAR; INTRAVENOUS at 09:08

## 2019-10-07 RX ADMIN — PHENYLEPHRINE HYDROCHLORIDE 40 MCG: 10 INJECTION INTRAVENOUS at 09:15

## 2019-10-07 RX ADMIN — PHENYLEPHRINE HYDROCHLORIDE 40 MCG: 10 INJECTION INTRAVENOUS at 10:07

## 2019-10-07 RX ADMIN — SODIUM CHLORIDE, SODIUM LACTATE, POTASSIUM CHLORIDE, AND CALCIUM CHLORIDE: 600; 310; 30; 20 INJECTION, SOLUTION INTRAVENOUS at 09:30

## 2019-10-07 RX ADMIN — PHENYLEPHRINE HYDROCHLORIDE 40 MCG: 10 INJECTION INTRAVENOUS at 09:06

## 2019-10-07 RX ADMIN — FENTANYL CITRATE 100 MCG: 50 INJECTION, SOLUTION INTRAMUSCULAR; INTRAVENOUS at 08:39

## 2019-10-07 RX ADMIN — KETAMINE HYDROCHLORIDE 10 MG: 10 INJECTION INTRAMUSCULAR; INTRAVENOUS at 09:45

## 2019-10-07 RX ADMIN — ROCURONIUM BROMIDE 35 MG: 50 INJECTION, SOLUTION INTRAVENOUS at 08:41

## 2019-10-07 RX ADMIN — PHENYLEPHRINE HYDROCHLORIDE 40 MCG: 10 INJECTION INTRAVENOUS at 09:28

## 2019-10-07 RX ADMIN — PROPOFOL 60 MG: 10 INJECTION, EMULSION INTRAVENOUS at 08:43

## 2019-10-07 RX ADMIN — ROCURONIUM BROMIDE 10 MG: 50 INJECTION, SOLUTION INTRAVENOUS at 09:40

## 2019-10-07 RX ADMIN — SODIUM CHLORIDE, SODIUM LACTATE, POTASSIUM CHLORIDE, AND CALCIUM CHLORIDE 125 ML/HR: 600; 310; 30; 20 INJECTION, SOLUTION INTRAVENOUS at 07:28

## 2019-10-07 RX ADMIN — PROPOFOL 170 MG: 10 INJECTION, EMULSION INTRAVENOUS at 08:41

## 2019-10-07 RX ADMIN — KETAMINE HYDROCHLORIDE 10 MG: 10 INJECTION INTRAMUSCULAR; INTRAVENOUS at 09:30

## 2019-10-07 RX ADMIN — NEOSTIGMINE METHYLSULFATE 3 MG: 1 INJECTION, SOLUTION INTRAVENOUS at 09:55

## 2019-10-07 NOTE — ANESTHESIA PREPROCEDURE EVALUATION
Relevant Problems   No relevant active problems       Anesthetic History   No history of anesthetic complications            Review of Systems / Medical History  Patient summary reviewed, nursing notes reviewed and pertinent labs reviewed    Pulmonary    COPD: mild      Smoker         Neuro/Psych         Psychiatric history    Comments: PAIN = 0/10   Left hip to inner left foor  Anxiety/depression Cardiovascular    Hypertension: well controlled              Exercise tolerance: >4 METS     GI/Hepatic/Renal  Within defined limits              Endo/Other      Hypothyroidism: well controlled  Arthritis     Other Findings              Physical Exam    Airway  Mallampati: I    Neck ROM: normal range of motion   Mouth opening: Normal     Cardiovascular    Rhythm: regular  Rate: normal         Dental         Pulmonary  Breath sounds clear to auscultation               Abdominal         Other Findings            Anesthetic Plan    ASA: 2  Anesthesia type: general          Induction: Intravenous  Anesthetic plan and risks discussed with: Patient      Informed consent obtained.

## 2019-10-07 NOTE — H&P
Date of Surgery Update:  Nnamdi Booth was seen and examined. History and physical has been reviewed. The patient has been examined.  There have been no significant clinical changes since the completion of the originally dated History and Physical.    Signed By: Feliciano Nunez MD     October 7, 2019 7:38 AM

## 2019-10-07 NOTE — OP NOTES
[address]  [address] [facility] [place], [address]    OPERATIVE REPORT      NAME: Yobani Waldrop    AGE: 53 y.o. YOB: 1975    MEDICAL RECORD NUMBER: [id number]    DATE OF SURGERY: 10/7/2019    PREOPERATIVE DIAGNOSIS: Lumbar disk herniation    POSTOPERATIVE DIAGNOSIS: Lumbar disk herniation    OPERATIVE PROCEDURE: Left-sided L4-L5 hemilaminotomy, partial facetectomy, and excision of herniated disk tissue, with the use of the operating room microscope. SURGEON: Shawnee Avitia MD     ASSISTANT: Isael Servni PA-C    ANESTHESIA: General    COMPLICATIONS: None     ESTIMATED BLOOD LOSS: 10 cc    Specimens - none    INDICATION FOR PROCEDURE: The patient is a very pleasant 53 y.o. male with debilitating left leg pain who failed conservative measures. He elected to proceed with operative intervention. He was aware of the risks, benefits, and alternatives. He provided informed consent. PROCEDURE: The patient was identified in the preoperative holding area. The lumbar spine was marked by me. He was transferred to the operating room where general anesthesia was given. He was also given ancef perioperative antibiotics. He was placed prone on the Jimbo frame. All bony prominences were well padded. The lumbar spine was prepped and draped in the usual standard fashion. We performed a surgical timeout. I made a skin incision over L4-L5. I exposed L4-L5 in standard fashion with electrocautery. I placed retractors and obtained intraoperative fluoroscopy to verify our level. I brought in the microscope. I then performed a left-sided hemilaminotomy with the high-speed bur of L4 and L5. I excised the ligamentum flavum to expose the neurologic elements. I performed a partial medial facetectomy on the left side. I gently retracted the nerve root medially to expose our disk herniation, which was excised in standard fashion.  The nerve root and thecal sac were decompressed and under no undue tension. I copiously irrigated the entire wound. We had good hemostasis. There was no CSF leaking under microscopic inspection. The soft tissues were injected with 0.5% Marcaine. We closed the wound in several layers. A sterile dressing was applied. The patient was extubated and transferred to the recovery room in good medical condition. The PA assisted with retraction and closure. I, Dr. Cecilia Clemens, performed the above procedures.      Cecilia Clemens MD  10/7/2019

## 2019-10-07 NOTE — ANESTHESIA POSTPROCEDURE EVALUATION
Procedure(s):  LEFT L4-L5  MICRODISCECTOMY. general    Anesthesia Post Evaluation      Multimodal analgesia: multimodal analgesia not used between 6 hours prior to anesthesia start to PACU discharge  Patient location during evaluation: PACU  Patient participation: complete - patient participated  Level of consciousness: awake and alert  Pain score: 0  Pain management: adequate  Airway patency: patent  Anesthetic complications: no  Cardiovascular status: hemodynamically stable and acceptable  Respiratory status: acceptable  Hydration status: acceptable  Comments: Patient seen and evaluated; no concerns.   Post anesthesia nausea and vomiting:  none      Vitals Value Taken Time   /87 10/7/2019 11:01 AM   Temp 36.4 °C (97.5 °F) 10/7/2019 10:29 AM   Pulse 76 10/7/2019 11:05 AM   Resp 18 10/7/2019 11:05 AM   SpO2 94 % 10/7/2019 11:05 AM

## 2019-10-07 NOTE — DISCHARGE INSTRUCTIONS
Lumbar Surgery Discharge Instructions    Activities  1. You are going home a well person, be as active as possible. Your only exercise should be walking. Start with short frequent walks and increase your walking distance each day. Limit the amount of time you sit to 20-30 minute intervals. Sitting for prolonged periods of time will be uncomfortable for you following your surgery. 2. Do not lift anything over five pounds. 3. Do not do any straining, twisting or bending. 4. When you are in the bed, you may lay on your back or on either side. Do not lay on your stomach. Diet   You may resume your normal diet. If your throat is sore, you may want to eat soft foods for a few days. Be sure to drink plenty of fluids, it is important to keep yourself hydrated.  Avoid alcoholic beverages and tobacco products. Medications  1. Take your pain medication as directed. 2. It is important to have regular bowel movements. Pain medications may cause constipation. Stool softeners, prune juice, and increasing your water and fiber intake may help in preventing constipation. 3. Laxatives should only be used if the above preventative measures have failed and you still have not had a bowel movement after three days. Driving  You may not drive or return to work until instructed by your physician. However, you may ride in the car for short periods of time. Showering  1. You may shower three days after your surgery if your incision is not draining. 2. Leave the dressing on during your shower allowing the water to run over it. Once you get out of the shower, pat dressing dry. .  3. Do not take a tub bath. Caring for your incision Leave the surgical dressing on for 7 days. Then apply a dry dressing to the incision daily. If it is completely dry with no drainage, leave open to air. Notify your physician if you develop any of the following conditions:  1.  Fever above 101 degrees for 24 hours.  2. Nausea or vomiting. 3. Severe headache. 4. Inability to urinate. 5. Loss of bowel or bladder function. 6. Changes in sensation in your extremities (numbness, tingling, loss of color). 7. Severe pain or pain not relieved by medications. 8. Redness, swelling, or drainage from your incision. 9. Persistent pain in the chest or calf of either leg. 10. Increased weakness (if this is greater than before your surgery). DISCHARGE SUMMARY from your Nurse    The following personal items collected during your admission are returned to you:   Dental Appliance: Dental Appliances: None  Vision: Visual Aid: None  Hearing Aid:    Jewelry: Jewelry: None  Clothing: Clothing: Other (comment)(street clothing placed in bag and intol locker)  Other Valuables:    Valuables sent to safe:      PATIENT INSTRUCTIONS:    After general anesthesia or intravenous sedation, for 24 hours or while taking prescription Narcotics:  · Limit your activities  · Do not drive and operate hazardous machinery  · Do not make important personal or business decisions  · Do  not drink alcoholic beverages  · If you have not urinated within 8 hours after discharge, please contact your surgeon on call. Report the following to your surgeon:  · Excessive pain, swelling, redness or odor of or around the surgical area  · Temperature over 100.5  · Nausea and vomiting lasting longer than 4 hours or if unable to take medications  · Any signs of decreased circulation or nerve impairment to extremity: change in color, persistent  numbness, tingling, coldness or increase pain  · Any questions    COUGH AND DEEP BREATHE    Breathing deep and coughing are very important exercises to do after surgery. Deep breathing and coughing open the little air tubes and air sacks in your lungs. You take deep breaths every day. You may not even notice - it is just something you do when you sigh or yawn.   It is a natural exercise you do to keep these air passages open. After surgery, take deep breaths and cough, on purpose. Coughing and deep breathing help prevent bronchitis and pneumonia after surgery. If you had chest or belly surgery, use a pillow as a \"hug moi\" and hold it tightly to your chest or belly when you cough. DIRECTIONS:  6. Take 10 to 15 slow deep breaths every hour while awake. 7. Breathe in deeply, and hold it for 2 seconds. 8. Exhale slowly through puckered lips, like blowing up a balloon. 9. After every 4th or 5th deep breath, hug your pillow to your chest or belly and give a hard, deep cough. Yes, it will probably hurt. But doing this exercise is very important part of healing after surgery. Take your pain medicine to help you do this exercise without too much pain. IF YOU HAVE BEEN DIAGNOSED WITH SLEEP APNEA, PLEASE USE YOUR SLEEP APNEA DEVICE OR CPAP MACHINE WHEN YOU INTEND TO NAP AFTER TAKING PAIN MEDICATION. Ankle Pumps    Ankle pumps increase the circulation of oxygenated blood to your lower extremities and decrease your risk for circulation problems such as blood clots. They also stretch the muscles, tendons and ligaments in your foot and ankle, and prevent joint contracture in the ankle and foot, especially after surgeries on the legs. It is important to do ankle pump exercises regularly after surgery because immobility increases your risk for developing a blood clot. Your doctor may also have you take an Aspirin for the next few days as well. If your doctor did not ask you to take an Aspirin, consult with him before starting Aspirin therapy on your own. Slowly point your foot forward, feeling the muscles on the top of your lower leg stretch, and hold this position for 5 seconds. Next, pull your foot back toward you as far as possible, stretching the calf muscles, and hold that position for 5 seconds. Repeat with the other foot.   Perform 10 repetitions every hour while awake for both ankles if possible (down and then up with the foot once is one repetition). You should feel gentle stretching of the muscles in your lower leg when doing this exercise. If you feel pain, or your range of motion is limited, don't  Push too hard. Only go the limit your joint and muscles will let you go. If you have increasing pain, progressively worsening leg warmth or swelling, STOP the exercise and call your doctor. Below is information about the medications your doctor is prescribing after your visit:    Other information in your discharge envelope:  []     PRESCRIPTIONS  []     PHYSICAL THERAPY PRESCRIPTION  []     APPOINTMENT CARDS  []     Regional Anesthesia Pamphlet for block or block with On-Q Catheter from Anesthesia Service  []     Medical device information sheets/pamphlets from their    []     School/work excuse note. []     /parent work excuse note. These are general instructions for a healthy lifestyle:    *  Please give a list of your current medications to your Primary Care Provider. *  Please update this list whenever your medications are discontinued, doses are      changed, or new medications (including over-the-counter products) are added. *  Please carry medication information at all times in case of emergency situations. About Smoking  No smoking / No tobacco products / Avoid exposure to second hand smoke    Surgeon General's Warning:  Quitting smoking now greatly reduces serious risk to your health. Obesity, smoking, and sedentary lifestyle greatly increases your risk for illness and disease. A healthy diet, regular physical exercise & weight monitoring are important for maintaining a healthy lifestyle.     Congestive Heart Failure  You may be retaining fluid if you have a history of heart failure or if you experience any of the following symptoms:  Weight gain of 3 pounds or more overnight or 5 pounds in a week, increased swelling in our hands or feet or shortness of breath while lying flat in bed. Please call your doctor as soon as you notice any of these symptoms; do not wait until your next office visit. Recognize signs and symptoms of STROKE:  F - face looks uneven  A - arms unable to move or move even  S - speech slurred or non-existent  T - time-call 911 as soon as signs and symptoms begin-DO NOT go         Back to bed or wait to see if you get better-TIME IS BRAIN. Warning signs of HEART ATTACK  Call 911 if you have these symptoms    · Chest discomfort. Most heart attacks involve discomfort in the center of the chest that lasts more than a few minutes, or that goes away and comes back. It can feel like uncomfortable pressure, squeezing, fullness, or pain. · Discomfort in other areas of the upper body. Symptoms can include pain or discomfort in one or both        Arms, the back, neck, jaw, or stomach. ·  Shortness of breath with or without chest discomfort. · Other signs may include breaking out in a cold sweat, nausea, or lightheadedness    Don't wait more than five minutes to call 911 - MINUTES MATTER! Fast action can save your life. Calling 911 is almost always the fastest way to get lifesaving treatment. Emergency Medical Services staff can begin treatment when they arrive - up to an hour sooner than if someone gets to the hospital by car.

## 2019-10-07 NOTE — PERIOP NOTES
Floseal Hemostatic Matrix 10mL given intraoperatively by Dr Ibrahim Appl.      LOT  [id number]  REF  [id number]  EXP   02/27/2021

## 2019-10-07 NOTE — PROGRESS NOTES
12:08 PM    Spoke w/ pt by phone,  verified. Pt states that he does not want any post-operative Rx's. He wants to try taking otc tylenol/ibuprofen. Declines a muscle relaxer at this time. Will call the office for any further needs. He was treated w/ nasal bactroban ointment prior to surgery. All questions answered.      KAYLA Ness  Orthopaedic Spine Surgery  Physician Assistant to Dr. Xiomara Chakraborty

## 2019-10-28 LAB
ABO + RH BLD: NORMAL
BLOOD GROUP ANTIBODIES SERPL: NORMAL
SPECIMEN EXP DATE BLD: NORMAL

## 2023-01-01 ENCOUNTER — APPOINTMENT (OUTPATIENT)
Facility: HOSPITAL | Age: 48
DRG: 003 | End: 2023-01-01
Payer: MEDICAID

## 2023-01-01 ENCOUNTER — HOSPITAL ENCOUNTER (INPATIENT)
Facility: HOSPITAL | Age: 48
LOS: 22 days | DRG: 003 | End: 2023-12-02
Attending: STUDENT IN AN ORGANIZED HEALTH CARE EDUCATION/TRAINING PROGRAM | Admitting: THORACIC SURGERY (CARDIOTHORACIC VASCULAR SURGERY)
Payer: MEDICAID

## 2023-01-01 VITALS
HEIGHT: 73 IN | RESPIRATION RATE: 10 BRPM | BODY MASS INDEX: 25.07 KG/M2 | TEMPERATURE: 98.7 F | HEART RATE: 90 BPM | OXYGEN SATURATION: 95 % | SYSTOLIC BLOOD PRESSURE: 137 MMHG | DIASTOLIC BLOOD PRESSURE: 56 MMHG | WEIGHT: 189.15 LBS

## 2023-01-01 DIAGNOSIS — Z98.890 S/P ASCENDING AORTIC ANEURYSM REPAIR: ICD-10-CM

## 2023-01-01 DIAGNOSIS — Z86.79 S/P ASCENDING AORTIC ANEURYSM REPAIR: ICD-10-CM

## 2023-01-01 DIAGNOSIS — Z95.4 S/P AORTIC VALVE ALLOGRAFT: Primary | ICD-10-CM

## 2023-01-01 DIAGNOSIS — I71.011 AORTIC ARCH DISSECTION (HCC): Primary | ICD-10-CM

## 2023-01-01 LAB
ABO + RH BLD: NORMAL
ACUTE KIDNEY INJURY RISK NEPHROCHECK: 0.47 (ref 0–0.3)
ALBUMIN SERPL-MCNC: 1.6 G/DL (ref 3.5–5)
ALBUMIN SERPL-MCNC: 1.6 G/DL (ref 3.5–5)
ALBUMIN SERPL-MCNC: 1.7 G/DL (ref 3.5–5)
ALBUMIN SERPL-MCNC: 1.7 G/DL (ref 3.5–5)
ALBUMIN SERPL-MCNC: 1.8 G/DL (ref 3.5–5)
ALBUMIN SERPL-MCNC: 1.8 G/DL (ref 3.5–5)
ALBUMIN SERPL-MCNC: 1.9 G/DL (ref 3.5–5)
ALBUMIN SERPL-MCNC: 1.9 G/DL (ref 3.5–5)
ALBUMIN SERPL-MCNC: 2.1 G/DL (ref 3.5–5)
ALBUMIN SERPL-MCNC: 2.2 G/DL (ref 3.5–5)
ALBUMIN SERPL-MCNC: 2.3 G/DL (ref 3.5–5)
ALBUMIN SERPL-MCNC: 2.4 G/DL (ref 3.5–5)
ALBUMIN SERPL-MCNC: 2.5 G/DL (ref 3.5–5)
ALBUMIN SERPL-MCNC: 2.6 G/DL (ref 3.5–5)
ALBUMIN SERPL-MCNC: 2.7 G/DL (ref 3.5–5)
ALBUMIN SERPL-MCNC: 2.9 G/DL (ref 3.5–5)
ALBUMIN SERPL-MCNC: 2.9 G/DL (ref 3.5–5)
ALBUMIN SERPL-MCNC: 3 G/DL (ref 3.5–5)
ALBUMIN SERPL-MCNC: 3.1 G/DL (ref 3.5–5)
ALBUMIN SERPL-MCNC: 3.2 G/DL (ref 3.5–5)
ALBUMIN SERPL-MCNC: 3.5 G/DL (ref 3.5–5)
ALBUMIN/GLOB SERPL: 0.4 (ref 1.1–2.2)
ALBUMIN/GLOB SERPL: 0.5 (ref 1.1–2.2)
ALBUMIN/GLOB SERPL: 0.6 (ref 1.1–2.2)
ALBUMIN/GLOB SERPL: 0.6 (ref 1.1–2.2)
ALBUMIN/GLOB SERPL: 0.7 (ref 1.1–2.2)
ALBUMIN/GLOB SERPL: 0.8 (ref 1.1–2.2)
ALBUMIN/GLOB SERPL: 0.9 (ref 1.1–2.2)
ALBUMIN/GLOB SERPL: 0.9 (ref 1.1–2.2)
ALBUMIN/GLOB SERPL: 1 (ref 1.1–2.2)
ALBUMIN/GLOB SERPL: 1.2 (ref 1.1–2.2)
ALBUMIN/GLOB SERPL: 1.4 (ref 1.1–2.2)
ALBUMIN/GLOB SERPL: 1.4 (ref 1.1–2.2)
ALBUMIN/GLOB SERPL: 2 (ref 1.1–2.2)
ALBUMIN/GLOB SERPL: 2.1 (ref 1.1–2.2)
ALBUMIN/GLOB SERPL: 2.2 (ref 1.1–2.2)
ALP SERPL-CCNC: 102 U/L (ref 45–117)
ALP SERPL-CCNC: 104 U/L (ref 45–117)
ALP SERPL-CCNC: 108 U/L (ref 45–117)
ALP SERPL-CCNC: 110 U/L (ref 45–117)
ALP SERPL-CCNC: 116 U/L (ref 45–117)
ALP SERPL-CCNC: 118 U/L (ref 45–117)
ALP SERPL-CCNC: 128 U/L (ref 45–117)
ALP SERPL-CCNC: 171 U/L (ref 45–117)
ALP SERPL-CCNC: 178 U/L (ref 45–117)
ALP SERPL-CCNC: 187 U/L (ref 45–117)
ALP SERPL-CCNC: 197 U/L (ref 45–117)
ALP SERPL-CCNC: 197 U/L (ref 45–117)
ALP SERPL-CCNC: 222 U/L (ref 45–117)
ALP SERPL-CCNC: 26 U/L (ref 45–117)
ALP SERPL-CCNC: 28 U/L (ref 45–117)
ALP SERPL-CCNC: 31 U/L (ref 45–117)
ALP SERPL-CCNC: 32 U/L (ref 45–117)
ALP SERPL-CCNC: 32 U/L (ref 45–117)
ALP SERPL-CCNC: 35 U/L (ref 45–117)
ALP SERPL-CCNC: 36 U/L (ref 45–117)
ALP SERPL-CCNC: 36 U/L (ref 45–117)
ALP SERPL-CCNC: 42 U/L (ref 45–117)
ALP SERPL-CCNC: 51 U/L (ref 45–117)
ALP SERPL-CCNC: 61 U/L (ref 45–117)
ALP SERPL-CCNC: 71 U/L (ref 45–117)
ALP SERPL-CCNC: 76 U/L (ref 45–117)
ALP SERPL-CCNC: 82 U/L (ref 45–117)
ALP SERPL-CCNC: 92 U/L (ref 45–117)
ALP SERPL-CCNC: 94 U/L (ref 45–117)
ALT SERPL-CCNC: 153 U/L (ref 12–78)
ALT SERPL-CCNC: 177 U/L (ref 12–78)
ALT SERPL-CCNC: 22 U/L (ref 12–78)
ALT SERPL-CCNC: 256 U/L (ref 12–78)
ALT SERPL-CCNC: 277 U/L (ref 12–78)
ALT SERPL-CCNC: 280 U/L (ref 12–78)
ALT SERPL-CCNC: 283 U/L (ref 12–78)
ALT SERPL-CCNC: 302 U/L (ref 12–78)
ALT SERPL-CCNC: 39 U/L (ref 12–78)
ALT SERPL-CCNC: 41 U/L (ref 12–78)
ALT SERPL-CCNC: 416 U/L (ref 12–78)
ALT SERPL-CCNC: 48 U/L (ref 12–78)
ALT SERPL-CCNC: 49 U/L (ref 12–78)
ALT SERPL-CCNC: 49 U/L (ref 12–78)
ALT SERPL-CCNC: 51 U/L (ref 12–78)
ALT SERPL-CCNC: 51 U/L (ref 12–78)
ALT SERPL-CCNC: 53 U/L (ref 12–78)
ALT SERPL-CCNC: 53 U/L (ref 12–78)
ALT SERPL-CCNC: 54 U/L (ref 12–78)
ALT SERPL-CCNC: 54 U/L (ref 12–78)
ALT SERPL-CCNC: 58 U/L (ref 12–78)
ALT SERPL-CCNC: 58 U/L (ref 12–78)
ALT SERPL-CCNC: 63 U/L (ref 12–78)
ALT SERPL-CCNC: 67 U/L (ref 12–78)
ALT SERPL-CCNC: 71 U/L (ref 12–78)
ALT SERPL-CCNC: 72 U/L (ref 12–78)
ALT SERPL-CCNC: 83 U/L (ref 12–78)
ALT SERPL-CCNC: 88 U/L (ref 12–78)
ALT SERPL-CCNC: 94 U/L (ref 12–78)
ANION GAP BLD CALC-SCNC: ABNORMAL (ref 10–20)
ANION GAP BLD CALC-SCNC: NORMAL (ref 10–20)
ANION GAP SERPL CALC-SCNC: 0 MMOL/L (ref 5–15)
ANION GAP SERPL CALC-SCNC: 0 MMOL/L (ref 5–15)
ANION GAP SERPL CALC-SCNC: 1 MMOL/L (ref 5–15)
ANION GAP SERPL CALC-SCNC: 1 MMOL/L (ref 5–15)
ANION GAP SERPL CALC-SCNC: 12 MMOL/L (ref 5–15)
ANION GAP SERPL CALC-SCNC: 2 MMOL/L (ref 5–15)
ANION GAP SERPL CALC-SCNC: 3 MMOL/L (ref 5–15)
ANION GAP SERPL CALC-SCNC: 4 MMOL/L (ref 5–15)
ANION GAP SERPL CALC-SCNC: 5 MMOL/L (ref 5–15)
ANION GAP SERPL CALC-SCNC: 6 MMOL/L (ref 5–15)
ANION GAP SERPL CALC-SCNC: 7 MMOL/L (ref 5–15)
ANION GAP SERPL CALC-SCNC: 8 MMOL/L (ref 5–15)
APPEARANCE UR: ABNORMAL
APPEARANCE UR: CLEAR
APTT PPP: 27.4 SEC (ref 22.1–31)
APTT PPP: 28.6 SEC (ref 22.1–31)
APTT PPP: 30.5 SEC (ref 22.1–31)
APTT PPP: 35.6 SEC (ref 22.1–31)
APTT PPP: 36.2 SEC (ref 22.1–31)
APTT PPP: 36.7 SEC (ref 22.1–31)
APTT PPP: 37.9 SEC (ref 22.1–31)
APTT PPP: 40.9 SEC (ref 22.1–31)
APTT PPP: 41.9 SEC (ref 22.1–31)
APTT PPP: 42.4 SEC (ref 22.1–31)
APTT PPP: 46.3 SEC (ref 22.1–31)
APTT PPP: 49 SEC (ref 22.1–31)
APTT PPP: 51.1 SEC (ref 22.1–31)
APTT PPP: 55 SEC (ref 22.1–31)
APTT PPP: 55.4 SEC (ref 22.1–31)
APTT PPP: 56.1 SEC (ref 22.1–31)
APTT PPP: 56.1 SEC (ref 22.1–31)
APTT PPP: 56.7 SEC (ref 22.1–31)
APTT PPP: 58.1 SEC (ref 22.1–31)
APTT PPP: 58.7 SEC (ref 22.1–31)
APTT PPP: 59 SEC (ref 22.1–31)
APTT PPP: 59.3 SEC (ref 22.1–31)
APTT PPP: 60.1 SEC (ref 22.1–31)
APTT PPP: 60.8 SEC (ref 22.1–31)
APTT PPP: 60.9 SEC (ref 22.1–31)
APTT PPP: 61.5 SEC (ref 22.1–31)
APTT PPP: 62.2 SEC (ref 22.1–31)
APTT PPP: 62.8 SEC (ref 22.1–31)
APTT PPP: 62.8 SEC (ref 22.1–31)
APTT PPP: 62.9 SEC (ref 22.1–31)
APTT PPP: 63.5 SEC (ref 22.1–31)
APTT PPP: 67.5 SEC (ref 22.1–31)
APTT PPP: 69.5 SEC (ref 22.1–31)
APTT PPP: 70.6 SEC (ref 22.1–31)
APTT PPP: 77.6 SEC (ref 22.1–31)
APTT PPP: 79.8 SEC (ref 22.1–31)
APTT PPP: 80.9 SEC (ref 22.1–31)
ARTERIAL PATENCY WRIST A: ABNORMAL
AST SERPL-CCNC: 101 U/L (ref 15–37)
AST SERPL-CCNC: 115 U/L (ref 15–37)
AST SERPL-CCNC: 135 U/L (ref 15–37)
AST SERPL-CCNC: 163 U/L (ref 15–37)
AST SERPL-CCNC: 168 U/L (ref 15–37)
AST SERPL-CCNC: 19 U/L (ref 15–37)
AST SERPL-CCNC: 260 U/L (ref 15–37)
AST SERPL-CCNC: 294 U/L (ref 15–37)
AST SERPL-CCNC: 374 U/L (ref 15–37)
AST SERPL-CCNC: 427 U/L (ref 15–37)
AST SERPL-CCNC: 50 U/L (ref 15–37)
AST SERPL-CCNC: 61 U/L (ref 15–37)
AST SERPL-CCNC: 64 U/L (ref 15–37)
AST SERPL-CCNC: 67 U/L (ref 15–37)
AST SERPL-CCNC: 70 U/L (ref 15–37)
AST SERPL-CCNC: 70 U/L (ref 15–37)
AST SERPL-CCNC: 71 U/L (ref 15–37)
AST SERPL-CCNC: 71 U/L (ref 15–37)
AST SERPL-CCNC: 74 U/L (ref 15–37)
AST SERPL-CCNC: 75 U/L (ref 15–37)
AST SERPL-CCNC: 80 U/L (ref 15–37)
AST SERPL-CCNC: 82 U/L (ref 15–37)
AST SERPL-CCNC: 82 U/L (ref 15–37)
AST SERPL-CCNC: 83 U/L (ref 15–37)
AST SERPL-CCNC: 860 U/L (ref 15–37)
AST SERPL-CCNC: 89 U/L (ref 15–37)
AST SERPL-CCNC: 95 U/L (ref 15–37)
BACTERIA SPEC CULT: ABNORMAL
BACTERIA SPEC CULT: NORMAL
BACTERIA URNS QL MICRO: NEGATIVE /HPF
BACTERIA URNS QL MICRO: NEGATIVE /HPF
BASE DEFICIT BLD-SCNC: 0.3 MMOL/L
BASE DEFICIT BLD-SCNC: 0.6 MMOL/L
BASE DEFICIT BLD-SCNC: 0.9 MMOL/L
BASE DEFICIT BLD-SCNC: 1 MMOL/L
BASE DEFICIT BLD-SCNC: 1 MMOL/L
BASE DEFICIT BLD-SCNC: 1.1 MMOL/L
BASE DEFICIT BLD-SCNC: 1.2 MMOL/L
BASE DEFICIT BLD-SCNC: 2.4 MMOL/L
BASE DEFICIT BLDV-SCNC: 0.3 MMOL/L
BASE DEFICIT BLDV-SCNC: 0.7 MMOL/L
BASE DEFICIT BLDV-SCNC: 1 MMOL/L
BASE EXCESS BLD CALC-SCNC: 0.1 MMOL/L
BASE EXCESS BLD CALC-SCNC: 0.5 MMOL/L
BASE EXCESS BLD CALC-SCNC: 0.7 MMOL/L
BASE EXCESS BLD CALC-SCNC: 0.8 MMOL/L
BASE EXCESS BLD CALC-SCNC: 0.8 MMOL/L
BASE EXCESS BLD CALC-SCNC: 0.9 MMOL/L
BASE EXCESS BLD CALC-SCNC: 1 MMOL/L
BASE EXCESS BLD CALC-SCNC: 1.1 MMOL/L
BASE EXCESS BLD CALC-SCNC: 1.3 MMOL/L
BASE EXCESS BLD CALC-SCNC: 1.4 MMOL/L
BASE EXCESS BLD CALC-SCNC: 1.5 MMOL/L
BASE EXCESS BLD CALC-SCNC: 1.5 MMOL/L
BASE EXCESS BLD CALC-SCNC: 1.6 MMOL/L
BASE EXCESS BLD CALC-SCNC: 1.6 MMOL/L
BASE EXCESS BLD CALC-SCNC: 1.7 MMOL/L
BASE EXCESS BLD CALC-SCNC: 1.9 MMOL/L
BASE EXCESS BLD CALC-SCNC: 10.1 MMOL/L
BASE EXCESS BLD CALC-SCNC: 10.2 MMOL/L
BASE EXCESS BLD CALC-SCNC: 10.2 MMOL/L
BASE EXCESS BLD CALC-SCNC: 10.3 MMOL/L
BASE EXCESS BLD CALC-SCNC: 10.5 MMOL/L
BASE EXCESS BLD CALC-SCNC: 10.5 MMOL/L
BASE EXCESS BLD CALC-SCNC: 10.6 MMOL/L
BASE EXCESS BLD CALC-SCNC: 10.9 MMOL/L
BASE EXCESS BLD CALC-SCNC: 11 MMOL/L
BASE EXCESS BLD CALC-SCNC: 11.1 MMOL/L
BASE EXCESS BLD CALC-SCNC: 11.2 MMOL/L
BASE EXCESS BLD CALC-SCNC: 11.5 MMOL/L
BASE EXCESS BLD CALC-SCNC: 11.7 MMOL/L
BASE EXCESS BLD CALC-SCNC: 11.9 MMOL/L
BASE EXCESS BLD CALC-SCNC: 11.9 MMOL/L
BASE EXCESS BLD CALC-SCNC: 12.2 MMOL/L
BASE EXCESS BLD CALC-SCNC: 12.3 MMOL/L
BASE EXCESS BLD CALC-SCNC: 12.4 MMOL/L
BASE EXCESS BLD CALC-SCNC: 12.5 MMOL/L
BASE EXCESS BLD CALC-SCNC: 12.6 MMOL/L
BASE EXCESS BLD CALC-SCNC: 13.1 MMOL/L
BASE EXCESS BLD CALC-SCNC: 13.1 MMOL/L
BASE EXCESS BLD CALC-SCNC: 13.3 MMOL/L
BASE EXCESS BLD CALC-SCNC: 13.7 MMOL/L
BASE EXCESS BLD CALC-SCNC: 13.8 MMOL/L
BASE EXCESS BLD CALC-SCNC: 14 MMOL/L
BASE EXCESS BLD CALC-SCNC: 14.4 MMOL/L
BASE EXCESS BLD CALC-SCNC: 15.1 MMOL/L
BASE EXCESS BLD CALC-SCNC: 15.1 MMOL/L
BASE EXCESS BLD CALC-SCNC: 15.8 MMOL/L
BASE EXCESS BLD CALC-SCNC: 2 MMOL/L
BASE EXCESS BLD CALC-SCNC: 2 MMOL/L
BASE EXCESS BLD CALC-SCNC: 2.1 MMOL/L
BASE EXCESS BLD CALC-SCNC: 2.1 MMOL/L
BASE EXCESS BLD CALC-SCNC: 2.2 MMOL/L
BASE EXCESS BLD CALC-SCNC: 2.3 MMOL/L
BASE EXCESS BLD CALC-SCNC: 2.4 MMOL/L
BASE EXCESS BLD CALC-SCNC: 2.4 MMOL/L
BASE EXCESS BLD CALC-SCNC: 2.6 MMOL/L
BASE EXCESS BLD CALC-SCNC: 2.7 MMOL/L
BASE EXCESS BLD CALC-SCNC: 2.8 MMOL/L
BASE EXCESS BLD CALC-SCNC: 2.9 MMOL/L
BASE EXCESS BLD CALC-SCNC: 3 MMOL/L
BASE EXCESS BLD CALC-SCNC: 3 MMOL/L
BASE EXCESS BLD CALC-SCNC: 3.1 MMOL/L
BASE EXCESS BLD CALC-SCNC: 3.1 MMOL/L
BASE EXCESS BLD CALC-SCNC: 3.2 MMOL/L
BASE EXCESS BLD CALC-SCNC: 3.3 MMOL/L
BASE EXCESS BLD CALC-SCNC: 3.3 MMOL/L
BASE EXCESS BLD CALC-SCNC: 3.4 MMOL/L
BASE EXCESS BLD CALC-SCNC: 3.4 MMOL/L
BASE EXCESS BLD CALC-SCNC: 3.6 MMOL/L
BASE EXCESS BLD CALC-SCNC: 3.7 MMOL/L
BASE EXCESS BLD CALC-SCNC: 3.8 MMOL/L
BASE EXCESS BLD CALC-SCNC: 3.8 MMOL/L
BASE EXCESS BLD CALC-SCNC: 3.9 MMOL/L
BASE EXCESS BLD CALC-SCNC: 3.9 MMOL/L
BASE EXCESS BLD CALC-SCNC: 4 MMOL/L
BASE EXCESS BLD CALC-SCNC: 4.1 MMOL/L
BASE EXCESS BLD CALC-SCNC: 4.3 MMOL/L
BASE EXCESS BLD CALC-SCNC: 4.4 MMOL/L
BASE EXCESS BLD CALC-SCNC: 4.5 MMOL/L
BASE EXCESS BLD CALC-SCNC: 4.6 MMOL/L
BASE EXCESS BLD CALC-SCNC: 4.7 MMOL/L
BASE EXCESS BLD CALC-SCNC: 4.8 MMOL/L
BASE EXCESS BLD CALC-SCNC: 4.9 MMOL/L
BASE EXCESS BLD CALC-SCNC: 5 MMOL/L
BASE EXCESS BLD CALC-SCNC: 5.1 MMOL/L
BASE EXCESS BLD CALC-SCNC: 5.1 MMOL/L
BASE EXCESS BLD CALC-SCNC: 5.3 MMOL/L
BASE EXCESS BLD CALC-SCNC: 5.4 MMOL/L
BASE EXCESS BLD CALC-SCNC: 5.4 MMOL/L
BASE EXCESS BLD CALC-SCNC: 5.5 MMOL/L
BASE EXCESS BLD CALC-SCNC: 5.6 MMOL/L
BASE EXCESS BLD CALC-SCNC: 5.6 MMOL/L
BASE EXCESS BLD CALC-SCNC: 5.9 MMOL/L
BASE EXCESS BLD CALC-SCNC: 6.3 MMOL/L
BASE EXCESS BLD CALC-SCNC: 6.4 MMOL/L
BASE EXCESS BLD CALC-SCNC: 7.2 MMOL/L
BASE EXCESS BLD CALC-SCNC: 7.2 MMOL/L
BASE EXCESS BLD CALC-SCNC: 7.5 MMOL/L
BASE EXCESS BLD CALC-SCNC: 7.6 MMOL/L
BASE EXCESS BLD CALC-SCNC: 7.9 MMOL/L
BASE EXCESS BLD CALC-SCNC: 8 MMOL/L
BASE EXCESS BLD CALC-SCNC: 8.2 MMOL/L
BASE EXCESS BLD CALC-SCNC: 8.3 MMOL/L
BASE EXCESS BLD CALC-SCNC: 8.5 MMOL/L
BASE EXCESS BLD CALC-SCNC: 8.6 MMOL/L
BASE EXCESS BLD CALC-SCNC: 8.9 MMOL/L
BASE EXCESS BLD CALC-SCNC: 9.1 MMOL/L
BASE EXCESS BLD CALC-SCNC: 9.3 MMOL/L
BASE EXCESS BLD CALC-SCNC: 9.3 MMOL/L
BASE EXCESS BLD CALC-SCNC: 9.4 MMOL/L
BASE EXCESS BLD CALC-SCNC: 9.5 MMOL/L
BASE EXCESS BLD CALC-SCNC: 9.5 MMOL/L
BASE EXCESS BLDV CALC-SCNC: 0.5 MMOL/L
BASE EXCESS BLDV CALC-SCNC: 0.9 MMOL/L
BASE EXCESS BLDV CALC-SCNC: 1.4 MMOL/L
BASE EXCESS BLDV CALC-SCNC: 1.9 MMOL/L
BASE EXCESS BLDV CALC-SCNC: 10.3 MMOL/L
BASE EXCESS BLDV CALC-SCNC: 10.5 MMOL/L
BASE EXCESS BLDV CALC-SCNC: 10.7 MMOL/L
BASE EXCESS BLDV CALC-SCNC: 11.5 MMOL/L
BASE EXCESS BLDV CALC-SCNC: 11.6 MMOL/L
BASE EXCESS BLDV CALC-SCNC: 12.4 MMOL/L
BASE EXCESS BLDV CALC-SCNC: 12.9 MMOL/L
BASE EXCESS BLDV CALC-SCNC: 2.4 MMOL/L
BASE EXCESS BLDV CALC-SCNC: 2.6 MMOL/L
BASE EXCESS BLDV CALC-SCNC: 2.7 MMOL/L
BASE EXCESS BLDV CALC-SCNC: 2.7 MMOL/L
BASE EXCESS BLDV CALC-SCNC: 2.9 MMOL/L
BASE EXCESS BLDV CALC-SCNC: 2.9 MMOL/L
BASE EXCESS BLDV CALC-SCNC: 3 MMOL/L
BASE EXCESS BLDV CALC-SCNC: 3.3 MMOL/L
BASE EXCESS BLDV CALC-SCNC: 3.4 MMOL/L
BASE EXCESS BLDV CALC-SCNC: 3.7 MMOL/L
BASE EXCESS BLDV CALC-SCNC: 4.4 MMOL/L
BASE EXCESS BLDV CALC-SCNC: 5 MMOL/L
BASE EXCESS BLDV CALC-SCNC: 5.6 MMOL/L
BASE EXCESS BLDV CALC-SCNC: 5.7 MMOL/L
BASE EXCESS BLDV CALC-SCNC: 6.6 MMOL/L
BASE EXCESS BLDV CALC-SCNC: 7.3 MMOL/L
BASE EXCESS BLDV CALC-SCNC: 8.1 MMOL/L
BASE EXCESS BLDV CALC-SCNC: 8.8 MMOL/L
BASE EXCESS BLDV CALC-SCNC: 9.9 MMOL/L
BASOPHILS # BLD: 0 K/UL (ref 0–0.1)
BASOPHILS # BLD: 0 K/UL (ref 0–0.1)
BASOPHILS # BLD: 0.2 K/UL (ref 0–0.1)
BASOPHILS NFR BLD: 0 % (ref 0–1)
BASOPHILS NFR BLD: 0 % (ref 0–1)
BASOPHILS NFR BLD: 1 % (ref 0–1)
BDY SITE: ABNORMAL
BDY SITE: NORMAL
BDY SITE: NORMAL
BILIRUB DIRECT SERPL-MCNC: 3.4 MG/DL (ref 0–0.2)
BILIRUB SERPL-MCNC: 0.6 MG/DL (ref 0.2–1)
BILIRUB SERPL-MCNC: 1.5 MG/DL (ref 0.2–1)
BILIRUB SERPL-MCNC: 1.9 MG/DL (ref 0.2–1)
BILIRUB SERPL-MCNC: 1.9 MG/DL (ref 0.2–1)
BILIRUB SERPL-MCNC: 2.1 MG/DL (ref 0.2–1)
BILIRUB SERPL-MCNC: 2.2 MG/DL (ref 0.2–1)
BILIRUB SERPL-MCNC: 2.3 MG/DL (ref 0.2–1)
BILIRUB SERPL-MCNC: 2.4 MG/DL (ref 0.2–1)
BILIRUB SERPL-MCNC: 2.5 MG/DL (ref 0.2–1)
BILIRUB SERPL-MCNC: 2.5 MG/DL (ref 0.2–1)
BILIRUB SERPL-MCNC: 2.6 MG/DL (ref 0.2–1)
BILIRUB SERPL-MCNC: 2.8 MG/DL (ref 0.2–1)
BILIRUB SERPL-MCNC: 2.9 MG/DL (ref 0.2–1)
BILIRUB SERPL-MCNC: 3 MG/DL (ref 0.2–1)
BILIRUB SERPL-MCNC: 3 MG/DL (ref 0.2–1)
BILIRUB SERPL-MCNC: 3.1 MG/DL (ref 0.2–1)
BILIRUB SERPL-MCNC: 3.1 MG/DL (ref 0.2–1)
BILIRUB SERPL-MCNC: 3.3 MG/DL (ref 0.2–1)
BILIRUB SERPL-MCNC: 3.5 MG/DL (ref 0.2–1)
BILIRUB SERPL-MCNC: 3.8 MG/DL (ref 0.2–1)
BILIRUB SERPL-MCNC: 4.2 MG/DL (ref 0.2–1)
BILIRUB SERPL-MCNC: 4.7 MG/DL (ref 0.2–1)
BILIRUB SERPL-MCNC: 4.7 MG/DL (ref 0.2–1)
BILIRUB UR QL CFM: NEGATIVE
BILIRUB UR QL: NEGATIVE
BLD PROD TYP BPU: NORMAL
BLOOD BANK DISPENSE STATUS: NORMAL
BLOOD GROUP ANTIBODIES SERPL: NORMAL
BPU ID: NORMAL
BUN SERPL-MCNC: 10 MG/DL (ref 6–20)
BUN SERPL-MCNC: 12 MG/DL (ref 6–20)
BUN SERPL-MCNC: 14 MG/DL (ref 6–20)
BUN SERPL-MCNC: 18 MG/DL (ref 6–20)
BUN SERPL-MCNC: 22 MG/DL (ref 6–20)
BUN SERPL-MCNC: 35 MG/DL (ref 6–20)
BUN SERPL-MCNC: 38 MG/DL (ref 6–20)
BUN SERPL-MCNC: 39 MG/DL (ref 6–20)
BUN SERPL-MCNC: 40 MG/DL (ref 6–20)
BUN SERPL-MCNC: 41 MG/DL (ref 6–20)
BUN SERPL-MCNC: 42 MG/DL (ref 6–20)
BUN SERPL-MCNC: 43 MG/DL (ref 6–20)
BUN SERPL-MCNC: 45 MG/DL (ref 6–20)
BUN SERPL-MCNC: 45 MG/DL (ref 6–20)
BUN SERPL-MCNC: 46 MG/DL (ref 6–20)
BUN SERPL-MCNC: 47 MG/DL (ref 6–20)
BUN SERPL-MCNC: 48 MG/DL (ref 6–20)
BUN SERPL-MCNC: 48 MG/DL (ref 6–20)
BUN SERPL-MCNC: 49 MG/DL (ref 6–20)
BUN SERPL-MCNC: 52 MG/DL (ref 6–20)
BUN SERPL-MCNC: 53 MG/DL (ref 6–20)
BUN SERPL-MCNC: 53 MG/DL (ref 6–20)
BUN SERPL-MCNC: 56 MG/DL (ref 6–20)
BUN SERPL-MCNC: 57 MG/DL (ref 6–20)
BUN SERPL-MCNC: 58 MG/DL (ref 6–20)
BUN SERPL-MCNC: 59 MG/DL (ref 6–20)
BUN SERPL-MCNC: 60 MG/DL (ref 6–20)
BUN SERPL-MCNC: 67 MG/DL (ref 6–20)
BUN SERPL-MCNC: 69 MG/DL (ref 6–20)
BUN SERPL-MCNC: 76 MG/DL (ref 6–20)
BUN SERPL-MCNC: 77 MG/DL (ref 6–20)
BUN SERPL-MCNC: 78 MG/DL (ref 6–20)
BUN SERPL-MCNC: 80 MG/DL (ref 6–20)
BUN SERPL-MCNC: 82 MG/DL (ref 6–20)
BUN SERPL-MCNC: 83 MG/DL (ref 6–20)
BUN SERPL-MCNC: 84 MG/DL (ref 6–20)
BUN SERPL-MCNC: 84 MG/DL (ref 6–20)
BUN SERPL-MCNC: 85 MG/DL (ref 6–20)
BUN/CREAT SERPL: 10 (ref 12–20)
BUN/CREAT SERPL: 11 (ref 12–20)
BUN/CREAT SERPL: 14 (ref 12–20)
BUN/CREAT SERPL: 21 (ref 12–20)
BUN/CREAT SERPL: 24 (ref 12–20)
BUN/CREAT SERPL: 25 (ref 12–20)
BUN/CREAT SERPL: 26 (ref 12–20)
BUN/CREAT SERPL: 27 (ref 12–20)
BUN/CREAT SERPL: 29 (ref 12–20)
BUN/CREAT SERPL: 31 (ref 12–20)
BUN/CREAT SERPL: 32 (ref 12–20)
BUN/CREAT SERPL: 32 (ref 12–20)
BUN/CREAT SERPL: 33 (ref 12–20)
BUN/CREAT SERPL: 34 (ref 12–20)
BUN/CREAT SERPL: 35 (ref 12–20)
BUN/CREAT SERPL: 36 (ref 12–20)
BUN/CREAT SERPL: 37 (ref 12–20)
BUN/CREAT SERPL: 38 (ref 12–20)
BUN/CREAT SERPL: 38 (ref 12–20)
BUN/CREAT SERPL: 40 (ref 12–20)
BUN/CREAT SERPL: 42 (ref 12–20)
BUN/CREAT SERPL: 43 (ref 12–20)
BUN/CREAT SERPL: 49 (ref 12–20)
BUN/CREAT SERPL: 50 (ref 12–20)
BUN/CREAT SERPL: 50 (ref 12–20)
BUN/CREAT SERPL: 54 (ref 12–20)
BUN/CREAT SERPL: 55 (ref 12–20)
BUN/CREAT SERPL: 57 (ref 12–20)
BUN/CREAT SERPL: 58 (ref 12–20)
BUN/CREAT SERPL: 64 (ref 12–20)
BUN/CREAT SERPL: 65 (ref 12–20)
BUN/CREAT SERPL: 67 (ref 12–20)
BUN/CREAT SERPL: 7 (ref 12–20)
BUN/CREAT SERPL: 8 (ref 12–20)
CA-I BLD-MCNC: 1.08 MMOL/L (ref 1.12–1.32)
CA-I BLD-MCNC: 1.16 MMOL/L (ref 1.12–1.32)
CA-I BLD-MCNC: 1.16 MMOL/L (ref 1.12–1.32)
CA-I BLD-MCNC: 1.17 MMOL/L (ref 1.12–1.32)
CA-I BLD-MCNC: 1.18 MMOL/L (ref 1.12–1.32)
CA-I BLD-MCNC: 1.21 MMOL/L (ref 1.12–1.32)
CA-I BLD-MCNC: 1.22 MMOL/L (ref 1.12–1.32)
CA-I BLD-SCNC: 1.02 MMOL/L (ref 1.12–1.32)
CA-I BLD-SCNC: 1.08 MMOL/L (ref 1.12–1.32)
CA-I BLD-SCNC: 1.09 MMOL/L (ref 1.12–1.32)
CA-I BLD-SCNC: 1.1 MMOL/L (ref 1.12–1.32)
CA-I BLD-SCNC: 1.11 MMOL/L (ref 1.12–1.32)
CA-I BLD-SCNC: 1.12 MMOL/L (ref 1.12–1.32)
CA-I BLD-SCNC: 1.13 MMOL/L (ref 1.12–1.32)
CA-I BLD-SCNC: 1.14 MMOL/L (ref 1.12–1.32)
CA-I BLD-SCNC: 1.15 MMOL/L (ref 1.12–1.32)
CA-I BLD-SCNC: 1.16 MMOL/L (ref 1.12–1.32)
CA-I BLD-SCNC: 1.17 MMOL/L (ref 1.12–1.32)
CA-I BLD-SCNC: 1.18 MMOL/L (ref 1.12–1.32)
CA-I BLD-SCNC: 1.19 MMOL/L (ref 1.12–1.32)
CA-I BLD-SCNC: 1.2 MMOL/L (ref 1.12–1.32)
CA-I BLD-SCNC: 1.21 MMOL/L (ref 1.12–1.32)
CA-I BLD-SCNC: 1.22 MMOL/L (ref 1.12–1.32)
CA-I BLD-SCNC: 1.23 MMOL/L (ref 1.12–1.32)
CA-I BLD-SCNC: 1.24 MMOL/L (ref 1.12–1.32)
CA-I BLD-SCNC: 1.25 MMOL/L (ref 1.12–1.32)
CA-I BLD-SCNC: 1.26 MMOL/L (ref 1.12–1.32)
CA-I BLD-SCNC: 1.27 MMOL/L (ref 1.12–1.32)
CA-I BLD-SCNC: 1.28 MMOL/L (ref 1.12–1.32)
CALCIUM SERPL-MCNC: 6.9 MG/DL (ref 8.5–10.1)
CALCIUM SERPL-MCNC: 7.1 MG/DL (ref 8.5–10.1)
CALCIUM SERPL-MCNC: 7.2 MG/DL (ref 8.5–10.1)
CALCIUM SERPL-MCNC: 7.4 MG/DL (ref 8.5–10.1)
CALCIUM SERPL-MCNC: 7.4 MG/DL (ref 8.5–10.1)
CALCIUM SERPL-MCNC: 7.5 MG/DL (ref 8.5–10.1)
CALCIUM SERPL-MCNC: 7.6 MG/DL (ref 8.5–10.1)
CALCIUM SERPL-MCNC: 7.6 MG/DL (ref 8.5–10.1)
CALCIUM SERPL-MCNC: 7.8 MG/DL (ref 8.5–10.1)
CALCIUM SERPL-MCNC: 7.9 MG/DL (ref 8.5–10.1)
CALCIUM SERPL-MCNC: 8 MG/DL (ref 8.5–10.1)
CALCIUM SERPL-MCNC: 8.1 MG/DL (ref 8.5–10.1)
CALCIUM SERPL-MCNC: 8.2 MG/DL (ref 8.5–10.1)
CALCIUM SERPL-MCNC: 8.3 MG/DL (ref 8.5–10.1)
CALCIUM SERPL-MCNC: 8.4 MG/DL (ref 8.5–10.1)
CALCIUM SERPL-MCNC: 8.5 MG/DL (ref 8.5–10.1)
CALCIUM SERPL-MCNC: 8.5 MG/DL (ref 8.5–10.1)
CALCIUM SERPL-MCNC: 8.7 MG/DL (ref 8.5–10.1)
CALCIUM SERPL-MCNC: 8.8 MG/DL (ref 8.5–10.1)
CALCIUM SERPL-MCNC: 8.9 MG/DL (ref 8.5–10.1)
CALCIUM SERPL-MCNC: 9 MG/DL (ref 8.5–10.1)
CALCIUM SERPL-MCNC: 9.1 MG/DL (ref 8.5–10.1)
CALCIUM SERPL-MCNC: 9.2 MG/DL (ref 8.5–10.1)
CALCIUM SERPL-MCNC: 9.3 MG/DL (ref 8.5–10.1)
CALCIUM SERPL-MCNC: 9.3 MG/DL (ref 8.5–10.1)
CALCIUM SERPL-MCNC: 9.4 MG/DL (ref 8.5–10.1)
CHLORIDE SERPL-SCNC: 102 MMOL/L (ref 97–108)
CHLORIDE SERPL-SCNC: 105 MMOL/L (ref 97–108)
CHLORIDE SERPL-SCNC: 106 MMOL/L (ref 97–108)
CHLORIDE SERPL-SCNC: 106 MMOL/L (ref 97–108)
CHLORIDE SERPL-SCNC: 107 MMOL/L (ref 97–108)
CHLORIDE SERPL-SCNC: 108 MMOL/L (ref 97–108)
CHLORIDE SERPL-SCNC: 109 MMOL/L (ref 97–108)
CHLORIDE SERPL-SCNC: 111 MMOL/L (ref 97–108)
CHLORIDE SERPL-SCNC: 111 MMOL/L (ref 97–108)
CHLORIDE SERPL-SCNC: 112 MMOL/L (ref 97–108)
CHLORIDE SERPL-SCNC: 113 MMOL/L (ref 97–108)
CHLORIDE SERPL-SCNC: 114 MMOL/L (ref 97–108)
CHLORIDE SERPL-SCNC: 115 MMOL/L (ref 97–108)
CHLORIDE SERPL-SCNC: 116 MMOL/L (ref 97–108)
CHLORIDE SERPL-SCNC: 117 MMOL/L (ref 97–108)
CHLORIDE SERPL-SCNC: 118 MMOL/L (ref 97–108)
CHLORIDE SERPL-SCNC: 120 MMOL/L (ref 97–108)
CHLORIDE SERPL-SCNC: 120 MMOL/L (ref 97–108)
CHLORIDE SERPL-SCNC: 121 MMOL/L (ref 97–108)
CHLORIDE SERPL-SCNC: 121 MMOL/L (ref 97–108)
CHLORIDE SERPL-SCNC: 122 MMOL/L (ref 97–108)
CHLORIDE SERPL-SCNC: 124 MMOL/L (ref 97–108)
CHLORIDE SERPL-SCNC: 125 MMOL/L (ref 97–108)
CHOLEST SERPL-MCNC: 75 MG/DL
CO2 SERPL-SCNC: 22 MMOL/L (ref 21–32)
CO2 SERPL-SCNC: 23 MMOL/L (ref 21–32)
CO2 SERPL-SCNC: 24 MMOL/L (ref 21–32)
CO2 SERPL-SCNC: 24 MMOL/L (ref 21–32)
CO2 SERPL-SCNC: 25 MMOL/L (ref 21–32)
CO2 SERPL-SCNC: 26 MMOL/L (ref 21–32)
CO2 SERPL-SCNC: 27 MMOL/L (ref 21–32)
CO2 SERPL-SCNC: 28 MMOL/L (ref 21–32)
CO2 SERPL-SCNC: 29 MMOL/L (ref 21–32)
CO2 SERPL-SCNC: 30 MMOL/L (ref 21–32)
CO2 SERPL-SCNC: 31 MMOL/L (ref 21–32)
CO2 SERPL-SCNC: 31 MMOL/L (ref 21–32)
CO2 SERPL-SCNC: 32 MMOL/L (ref 21–32)
CO2 SERPL-SCNC: 33 MMOL/L (ref 21–32)
CO2 SERPL-SCNC: 34 MMOL/L (ref 21–32)
CO2 SERPL-SCNC: 34 MMOL/L (ref 21–32)
CO2 SERPL-SCNC: 35 MMOL/L (ref 21–32)
CO2 SERPL-SCNC: 36 MMOL/L (ref 21–32)
CO2 SERPL-SCNC: 37 MMOL/L (ref 21–32)
CO2 SERPL-SCNC: 38 MMOL/L (ref 21–32)
CO2 SERPL-SCNC: 39 MMOL/L (ref 21–32)
CO2 SERPL-SCNC: 39 MMOL/L (ref 21–32)
COHGB MFR BLD: 0.2 % (ref 1–2)
COHGB MFR BLD: 1.1 % (ref 1–2)
COHGB MFR BLD: 1.5 % (ref 1–2)
COHGB MFR BLD: 2.6 % (ref 1–2)
COLOR UR: ABNORMAL
COLOR UR: ABNORMAL
COMMENT:: NORMAL
CREAT SERPL-MCNC: 1.04 MG/DL (ref 0.7–1.3)
CREAT SERPL-MCNC: 1.04 MG/DL (ref 0.7–1.3)
CREAT SERPL-MCNC: 1.12 MG/DL (ref 0.7–1.3)
CREAT SERPL-MCNC: 1.17 MG/DL (ref 0.7–1.3)
CREAT SERPL-MCNC: 1.19 MG/DL (ref 0.7–1.3)
CREAT SERPL-MCNC: 1.19 MG/DL (ref 0.7–1.3)
CREAT SERPL-MCNC: 1.23 MG/DL (ref 0.7–1.3)
CREAT SERPL-MCNC: 1.3 MG/DL (ref 0.7–1.3)
CREAT SERPL-MCNC: 1.31 MG/DL (ref 0.7–1.3)
CREAT SERPL-MCNC: 1.32 MG/DL (ref 0.7–1.3)
CREAT SERPL-MCNC: 1.32 MG/DL (ref 0.7–1.3)
CREAT SERPL-MCNC: 1.36 MG/DL (ref 0.7–1.3)
CREAT SERPL-MCNC: 1.36 MG/DL (ref 0.7–1.3)
CREAT SERPL-MCNC: 1.37 MG/DL (ref 0.7–1.3)
CREAT SERPL-MCNC: 1.38 MG/DL (ref 0.7–1.3)
CREAT SERPL-MCNC: 1.4 MG/DL (ref 0.7–1.3)
CREAT SERPL-MCNC: 1.41 MG/DL (ref 0.7–1.3)
CREAT SERPL-MCNC: 1.42 MG/DL (ref 0.7–1.3)
CREAT SERPL-MCNC: 1.44 MG/DL (ref 0.7–1.3)
CREAT SERPL-MCNC: 1.45 MG/DL (ref 0.7–1.3)
CREAT SERPL-MCNC: 1.46 MG/DL (ref 0.7–1.3)
CREAT SERPL-MCNC: 1.48 MG/DL (ref 0.7–1.3)
CREAT SERPL-MCNC: 1.51 MG/DL (ref 0.7–1.3)
CREAT SERPL-MCNC: 1.52 MG/DL (ref 0.7–1.3)
CREAT SERPL-MCNC: 1.54 MG/DL (ref 0.7–1.3)
CREAT SERPL-MCNC: 1.56 MG/DL (ref 0.7–1.3)
CREAT SERPL-MCNC: 1.59 MG/DL (ref 0.7–1.3)
CREAT SERPL-MCNC: 1.61 MG/DL (ref 0.7–1.3)
CREAT SERPL-MCNC: 1.63 MG/DL (ref 0.7–1.3)
CREAT SERPL-MCNC: 1.65 MG/DL (ref 0.7–1.3)
CREAT SERPL-MCNC: 1.66 MG/DL (ref 0.7–1.3)
CREAT SERPL-MCNC: 1.71 MG/DL (ref 0.7–1.3)
CREAT SERPL-MCNC: 1.79 MG/DL (ref 0.7–1.3)
CREAT SERPL-MCNC: 1.85 MG/DL (ref 0.7–1.3)
CREAT SERPL-MCNC: 1.87 MG/DL (ref 0.7–1.3)
CREAT SERPL-MCNC: 1.89 MG/DL (ref 0.7–1.3)
CREAT SERPL-MCNC: 1.95 MG/DL (ref 0.7–1.3)
CREAT SERPL-MCNC: 2.04 MG/DL (ref 0.7–1.3)
CREAT SERPL-MCNC: 2.14 MG/DL (ref 0.7–1.3)
CREAT SERPL-MCNC: 2.18 MG/DL (ref 0.7–1.3)
CREAT SERPL-MCNC: 2.27 MG/DL (ref 0.7–1.3)
CROSSMATCH RESULT: NORMAL
DATE LAST DOSE: ABNORMAL
DIFFERENTIAL METHOD BLD: ABNORMAL
DOSE AMOUNT: ABNORMAL UNITS
DOSE DATE/TIME: ABNORMAL
EKG ATRIAL RATE: 133 BPM
EKG ATRIAL RATE: 66 BPM
EKG DIAGNOSIS: NORMAL
EKG DIAGNOSIS: NORMAL
EKG P AXIS: 12 DEGREES
EKG P-R INTERVAL: 226 MS
EKG Q-T INTERVAL: 398 MS
EKG Q-T INTERVAL: 570 MS
EKG QRS DURATION: 100 MS
EKG QRS DURATION: 128 MS
EKG QTC CALCULATION (BAZETT): 550 MS
EKG QTC CALCULATION (BAZETT): 597 MS
EKG R AXIS: 73 DEGREES
EKG R AXIS: 76 DEGREES
EKG T AXIS: 37 DEGREES
EKG T AXIS: 51 DEGREES
EKG VENTRICULAR RATE: 115 BPM
EKG VENTRICULAR RATE: 66 BPM
EOSINOPHIL # BLD: 0 K/UL (ref 0–0.4)
EOSINOPHIL # BLD: 0 K/UL (ref 0–0.4)
EOSINOPHIL # BLD: 0.7 K/UL (ref 0–0.4)
EOSINOPHIL NFR BLD: 0 % (ref 0–7)
EOSINOPHIL NFR BLD: 0 % (ref 0–7)
EOSINOPHIL NFR BLD: 4 % (ref 0–7)
EPITH CASTS URNS QL MICRO: ABNORMAL /LPF
EPITH CASTS URNS QL MICRO: ABNORMAL /LPF
ERYTHROCYTE [DISTWIDTH] IN BLOOD BY AUTOMATED COUNT: 13.9 % (ref 11.5–14.5)
ERYTHROCYTE [DISTWIDTH] IN BLOOD BY AUTOMATED COUNT: 14.1 % (ref 11.5–14.5)
ERYTHROCYTE [DISTWIDTH] IN BLOOD BY AUTOMATED COUNT: 14.3 % (ref 11.5–14.5)
ERYTHROCYTE [DISTWIDTH] IN BLOOD BY AUTOMATED COUNT: 14.4 % (ref 11.5–14.5)
ERYTHROCYTE [DISTWIDTH] IN BLOOD BY AUTOMATED COUNT: 14.5 % (ref 11.5–14.5)
ERYTHROCYTE [DISTWIDTH] IN BLOOD BY AUTOMATED COUNT: 14.7 % (ref 11.5–14.5)
ERYTHROCYTE [DISTWIDTH] IN BLOOD BY AUTOMATED COUNT: 14.8 % (ref 11.5–14.5)
ERYTHROCYTE [DISTWIDTH] IN BLOOD BY AUTOMATED COUNT: 14.9 % (ref 11.5–14.5)
ERYTHROCYTE [DISTWIDTH] IN BLOOD BY AUTOMATED COUNT: 14.9 % (ref 11.5–14.5)
ERYTHROCYTE [DISTWIDTH] IN BLOOD BY AUTOMATED COUNT: 16.6 % (ref 11.5–14.5)
ERYTHROCYTE [DISTWIDTH] IN BLOOD BY AUTOMATED COUNT: 16.6 % (ref 11.5–14.5)
ERYTHROCYTE [DISTWIDTH] IN BLOOD BY AUTOMATED COUNT: 17 % (ref 11.5–14.5)
ERYTHROCYTE [DISTWIDTH] IN BLOOD BY AUTOMATED COUNT: 17.2 % (ref 11.5–14.5)
ERYTHROCYTE [DISTWIDTH] IN BLOOD BY AUTOMATED COUNT: 17.4 % (ref 11.5–14.5)
ERYTHROCYTE [DISTWIDTH] IN BLOOD BY AUTOMATED COUNT: 17.8 % (ref 11.5–14.5)
ERYTHROCYTE [DISTWIDTH] IN BLOOD BY AUTOMATED COUNT: 18 % (ref 11.5–14.5)
ERYTHROCYTE [DISTWIDTH] IN BLOOD BY AUTOMATED COUNT: 18.1 % (ref 11.5–14.5)
ERYTHROCYTE [DISTWIDTH] IN BLOOD BY AUTOMATED COUNT: 18.1 % (ref 11.5–14.5)
ERYTHROCYTE [DISTWIDTH] IN BLOOD BY AUTOMATED COUNT: 18.4 % (ref 11.5–14.5)
ERYTHROCYTE [DISTWIDTH] IN BLOOD BY AUTOMATED COUNT: 18.4 % (ref 11.5–14.5)
ERYTHROCYTE [DISTWIDTH] IN BLOOD BY AUTOMATED COUNT: 18.5 % (ref 11.5–14.5)
ERYTHROCYTE [DISTWIDTH] IN BLOOD BY AUTOMATED COUNT: 18.6 % (ref 11.5–14.5)
ERYTHROCYTE [DISTWIDTH] IN BLOOD BY AUTOMATED COUNT: 18.9 % (ref 11.5–14.5)
ERYTHROCYTE [DISTWIDTH] IN BLOOD BY AUTOMATED COUNT: 19.1 % (ref 11.5–14.5)
ERYTHROCYTE [DISTWIDTH] IN BLOOD BY AUTOMATED COUNT: 19.2 % (ref 11.5–14.5)
ERYTHROCYTE [DISTWIDTH] IN BLOOD BY AUTOMATED COUNT: 19.3 % (ref 11.5–14.5)
ERYTHROCYTE [DISTWIDTH] IN BLOOD BY AUTOMATED COUNT: 19.6 % (ref 11.5–14.5)
ERYTHROCYTE [DISTWIDTH] IN BLOOD BY AUTOMATED COUNT: 19.6 % (ref 11.5–14.5)
ERYTHROCYTE [DISTWIDTH] IN BLOOD BY AUTOMATED COUNT: 19.9 % (ref 11.5–14.5)
EST. AVERAGE GLUCOSE BLD GHB EST-MCNC: 100 MG/DL
FACT VIII ACT/NOR PPP: 178 % (ref 56–140)
FIBRINOGEN PPP-MCNC: 142 MG/DL (ref 200–475)
FIBRINOGEN PPP-MCNC: 301 MG/DL (ref 200–475)
FIBRINOGEN PPP-MCNC: 364 MG/DL (ref 200–475)
FIO2 ON VENT: 100 %
FIO2 ON VENT: 40 %
FIO2 ON VENT: 40 %
FIO2 ON VENT: 50 %
FIO2 ON VENT: 50 %
FUNGITELL INTERPRETATION: ABNORMAL
FUNGITELL INTERPRETATION: ABNORMAL
FUNGITELL: ABNORMAL PG/ML
FUNGITELL: ABNORMAL PG/ML
GAS FLOW.O2 O2 DELIVERY SYS: 50 L/MIN
GAS FLOW.O2 O2 DELIVERY SYS: ABNORMAL
GAS FLOW.O2 SETTING OXYMISER: 10
GAS FLOW.O2 SETTING OXYMISER: 12
GAS FLOW.O2 SETTING OXYMISER: 20 BPM
GLOBULIN SER CALC-MCNC: 1.3 G/DL (ref 2–4)
GLOBULIN SER CALC-MCNC: 1.4 G/DL (ref 2–4)
GLOBULIN SER CALC-MCNC: 1.6 G/DL (ref 2–4)
GLOBULIN SER CALC-MCNC: 2.1 G/DL (ref 2–4)
GLOBULIN SER CALC-MCNC: 2.2 G/DL (ref 2–4)
GLOBULIN SER CALC-MCNC: 2.3 G/DL (ref 2–4)
GLOBULIN SER CALC-MCNC: 2.5 G/DL (ref 2–4)
GLOBULIN SER CALC-MCNC: 2.6 G/DL (ref 2–4)
GLOBULIN SER CALC-MCNC: 2.7 G/DL (ref 2–4)
GLOBULIN SER CALC-MCNC: 2.8 G/DL (ref 2–4)
GLOBULIN SER CALC-MCNC: 2.8 G/DL (ref 2–4)
GLOBULIN SER CALC-MCNC: 2.9 G/DL (ref 2–4)
GLOBULIN SER CALC-MCNC: 3.3 G/DL (ref 2–4)
GLOBULIN SER CALC-MCNC: 3.3 G/DL (ref 2–4)
GLOBULIN SER CALC-MCNC: 3.4 G/DL (ref 2–4)
GLOBULIN SER CALC-MCNC: 3.5 G/DL (ref 2–4)
GLOBULIN SER CALC-MCNC: 3.6 G/DL (ref 2–4)
GLOBULIN SER CALC-MCNC: 3.7 G/DL (ref 2–4)
GLOBULIN SER CALC-MCNC: 3.7 G/DL (ref 2–4)
GLOBULIN SER CALC-MCNC: 3.9 G/DL (ref 2–4)
GLOBULIN SER CALC-MCNC: 4.1 G/DL (ref 2–4)
GLOBULIN SER CALC-MCNC: 4.2 G/DL (ref 2–4)
GLOBULIN SER CALC-MCNC: 4.3 G/DL (ref 2–4)
GLUCOSE BLD STRIP.AUTO-MCNC: 102 MG/DL (ref 65–117)
GLUCOSE BLD STRIP.AUTO-MCNC: 102 MG/DL (ref 65–117)
GLUCOSE BLD STRIP.AUTO-MCNC: 103 MG/DL (ref 65–117)
GLUCOSE BLD STRIP.AUTO-MCNC: 104 MG/DL (ref 65–117)
GLUCOSE BLD STRIP.AUTO-MCNC: 104 MG/DL (ref 65–117)
GLUCOSE BLD STRIP.AUTO-MCNC: 105 MG/DL (ref 65–117)
GLUCOSE BLD STRIP.AUTO-MCNC: 105 MG/DL (ref 65–117)
GLUCOSE BLD STRIP.AUTO-MCNC: 108 MG/DL (ref 65–117)
GLUCOSE BLD STRIP.AUTO-MCNC: 109 MG/DL (ref 65–117)
GLUCOSE BLD STRIP.AUTO-MCNC: 110 MG/DL (ref 65–117)
GLUCOSE BLD STRIP.AUTO-MCNC: 111 MG/DL (ref 65–117)
GLUCOSE BLD STRIP.AUTO-MCNC: 111 MG/DL (ref 65–117)
GLUCOSE BLD STRIP.AUTO-MCNC: 113 MG/DL (ref 65–117)
GLUCOSE BLD STRIP.AUTO-MCNC: 114 MG/DL (ref 65–117)
GLUCOSE BLD STRIP.AUTO-MCNC: 115 MG/DL (ref 65–117)
GLUCOSE BLD STRIP.AUTO-MCNC: 117 MG/DL (ref 65–117)
GLUCOSE BLD STRIP.AUTO-MCNC: 118 MG/DL (ref 65–117)
GLUCOSE BLD STRIP.AUTO-MCNC: 119 MG/DL (ref 65–117)
GLUCOSE BLD STRIP.AUTO-MCNC: 120 MG/DL (ref 65–117)
GLUCOSE BLD STRIP.AUTO-MCNC: 122 MG/DL (ref 65–117)
GLUCOSE BLD STRIP.AUTO-MCNC: 123 MG/DL (ref 65–117)
GLUCOSE BLD STRIP.AUTO-MCNC: 123 MG/DL (ref 65–117)
GLUCOSE BLD STRIP.AUTO-MCNC: 124 MG/DL (ref 65–117)
GLUCOSE BLD STRIP.AUTO-MCNC: 124 MG/DL (ref 65–117)
GLUCOSE BLD STRIP.AUTO-MCNC: 128 MG/DL (ref 65–117)
GLUCOSE BLD STRIP.AUTO-MCNC: 129 MG/DL (ref 65–117)
GLUCOSE BLD STRIP.AUTO-MCNC: 129 MG/DL (ref 65–117)
GLUCOSE BLD STRIP.AUTO-MCNC: 130 MG/DL (ref 65–117)
GLUCOSE BLD STRIP.AUTO-MCNC: 130 MG/DL (ref 65–117)
GLUCOSE BLD STRIP.AUTO-MCNC: 131 MG/DL (ref 65–117)
GLUCOSE BLD STRIP.AUTO-MCNC: 132 MG/DL (ref 65–117)
GLUCOSE BLD STRIP.AUTO-MCNC: 133 MG/DL (ref 65–117)
GLUCOSE BLD STRIP.AUTO-MCNC: 133 MG/DL (ref 65–117)
GLUCOSE BLD STRIP.AUTO-MCNC: 139 MG/DL (ref 65–117)
GLUCOSE BLD STRIP.AUTO-MCNC: 140 MG/DL (ref 65–117)
GLUCOSE BLD STRIP.AUTO-MCNC: 142 MG/DL (ref 65–117)
GLUCOSE BLD STRIP.AUTO-MCNC: 142 MG/DL (ref 65–117)
GLUCOSE BLD STRIP.AUTO-MCNC: 143 MG/DL (ref 65–117)
GLUCOSE BLD STRIP.AUTO-MCNC: 146 MG/DL (ref 65–117)
GLUCOSE BLD STRIP.AUTO-MCNC: 148 MG/DL (ref 65–117)
GLUCOSE BLD STRIP.AUTO-MCNC: 148 MG/DL (ref 65–117)
GLUCOSE BLD STRIP.AUTO-MCNC: 149 MG/DL (ref 65–117)
GLUCOSE BLD STRIP.AUTO-MCNC: 150 MG/DL (ref 65–117)
GLUCOSE BLD STRIP.AUTO-MCNC: 151 MG/DL (ref 65–117)
GLUCOSE BLD STRIP.AUTO-MCNC: 152 MG/DL (ref 65–117)
GLUCOSE BLD STRIP.AUTO-MCNC: 153 MG/DL (ref 65–117)
GLUCOSE BLD STRIP.AUTO-MCNC: 154 MG/DL (ref 65–117)
GLUCOSE BLD STRIP.AUTO-MCNC: 155 MG/DL (ref 65–117)
GLUCOSE BLD STRIP.AUTO-MCNC: 156 MG/DL (ref 65–117)
GLUCOSE BLD STRIP.AUTO-MCNC: 156 MG/DL (ref 65–117)
GLUCOSE BLD STRIP.AUTO-MCNC: 157 MG/DL (ref 65–117)
GLUCOSE BLD STRIP.AUTO-MCNC: 158 MG/DL (ref 65–117)
GLUCOSE BLD STRIP.AUTO-MCNC: 158 MG/DL (ref 65–117)
GLUCOSE BLD STRIP.AUTO-MCNC: 161 MG/DL (ref 65–117)
GLUCOSE BLD STRIP.AUTO-MCNC: 163 MG/DL (ref 65–117)
GLUCOSE BLD STRIP.AUTO-MCNC: 164 MG/DL (ref 65–117)
GLUCOSE BLD STRIP.AUTO-MCNC: 165 MG/DL (ref 65–117)
GLUCOSE BLD STRIP.AUTO-MCNC: 168 MG/DL (ref 65–117)
GLUCOSE BLD STRIP.AUTO-MCNC: 171 MG/DL (ref 65–117)
GLUCOSE BLD STRIP.AUTO-MCNC: 172 MG/DL (ref 65–117)
GLUCOSE BLD STRIP.AUTO-MCNC: 173 MG/DL (ref 65–117)
GLUCOSE BLD STRIP.AUTO-MCNC: 173 MG/DL (ref 65–117)
GLUCOSE BLD STRIP.AUTO-MCNC: 174 MG/DL (ref 65–117)
GLUCOSE BLD STRIP.AUTO-MCNC: 179 MG/DL (ref 65–117)
GLUCOSE BLD STRIP.AUTO-MCNC: 180 MG/DL (ref 65–117)
GLUCOSE BLD STRIP.AUTO-MCNC: 182 MG/DL (ref 65–117)
GLUCOSE BLD STRIP.AUTO-MCNC: 185 MG/DL (ref 65–117)
GLUCOSE BLD STRIP.AUTO-MCNC: 186 MG/DL (ref 65–117)
GLUCOSE BLD STRIP.AUTO-MCNC: 187 MG/DL (ref 65–117)
GLUCOSE BLD STRIP.AUTO-MCNC: 189 MG/DL (ref 65–117)
GLUCOSE BLD STRIP.AUTO-MCNC: 190 MG/DL (ref 65–117)
GLUCOSE BLD STRIP.AUTO-MCNC: 192 MG/DL (ref 65–117)
GLUCOSE BLD STRIP.AUTO-MCNC: 193 MG/DL (ref 65–117)
GLUCOSE BLD STRIP.AUTO-MCNC: 198 MG/DL (ref 65–117)
GLUCOSE BLD STRIP.AUTO-MCNC: 199 MG/DL (ref 65–117)
GLUCOSE BLD STRIP.AUTO-MCNC: 204 MG/DL (ref 65–117)
GLUCOSE BLD STRIP.AUTO-MCNC: 205 MG/DL (ref 65–117)
GLUCOSE BLD STRIP.AUTO-MCNC: 205 MG/DL (ref 65–117)
GLUCOSE BLD STRIP.AUTO-MCNC: 207 MG/DL (ref 65–117)
GLUCOSE BLD STRIP.AUTO-MCNC: 209 MG/DL (ref 65–117)
GLUCOSE BLD STRIP.AUTO-MCNC: 212 MG/DL (ref 65–117)
GLUCOSE BLD STRIP.AUTO-MCNC: 213 MG/DL (ref 65–117)
GLUCOSE BLD STRIP.AUTO-MCNC: 218 MG/DL (ref 65–117)
GLUCOSE BLD STRIP.AUTO-MCNC: 227 MG/DL (ref 65–117)
GLUCOSE BLD STRIP.AUTO-MCNC: 235 MG/DL (ref 65–117)
GLUCOSE BLD STRIP.AUTO-MCNC: 247 MG/DL (ref 65–117)
GLUCOSE BLD STRIP.AUTO-MCNC: 260 MG/DL (ref 65–117)
GLUCOSE BLD STRIP.AUTO-MCNC: 274 MG/DL (ref 65–117)
GLUCOSE BLD STRIP.AUTO-MCNC: 276 MG/DL (ref 65–117)
GLUCOSE BLD STRIP.AUTO-MCNC: 289 MG/DL (ref 65–117)
GLUCOSE BLD STRIP.AUTO-MCNC: 289 MG/DL (ref 65–117)
GLUCOSE BLD STRIP.AUTO-MCNC: 74 MG/DL (ref 65–117)
GLUCOSE BLD STRIP.AUTO-MCNC: 81 MG/DL (ref 65–117)
GLUCOSE BLD STRIP.AUTO-MCNC: 83 MG/DL (ref 65–117)
GLUCOSE BLD STRIP.AUTO-MCNC: 87 MG/DL (ref 65–117)
GLUCOSE BLD STRIP.AUTO-MCNC: 88 MG/DL (ref 65–117)
GLUCOSE BLD STRIP.AUTO-MCNC: 91 MG/DL (ref 65–117)
GLUCOSE BLD STRIP.AUTO-MCNC: 95 MG/DL (ref 65–117)
GLUCOSE BLD STRIP.AUTO-MCNC: 97 MG/DL (ref 65–117)
GLUCOSE BLD STRIP.AUTO-MCNC: 97 MG/DL (ref 65–117)
GLUCOSE BLD STRIP.AUTO-MCNC: 98 MG/DL (ref 65–117)
GLUCOSE BLD STRIP.AUTO-MCNC: 98 MG/DL (ref 65–117)
GLUCOSE BLD STRIP.AUTO-MCNC: 99 MG/DL (ref 65–117)
GLUCOSE SERPL-MCNC: 100 MG/DL (ref 65–100)
GLUCOSE SERPL-MCNC: 102 MG/DL (ref 65–100)
GLUCOSE SERPL-MCNC: 104 MG/DL (ref 65–100)
GLUCOSE SERPL-MCNC: 106 MG/DL (ref 65–100)
GLUCOSE SERPL-MCNC: 110 MG/DL (ref 65–100)
GLUCOSE SERPL-MCNC: 112 MG/DL (ref 65–100)
GLUCOSE SERPL-MCNC: 113 MG/DL (ref 65–100)
GLUCOSE SERPL-MCNC: 113 MG/DL (ref 65–100)
GLUCOSE SERPL-MCNC: 114 MG/DL (ref 65–100)
GLUCOSE SERPL-MCNC: 115 MG/DL (ref 65–100)
GLUCOSE SERPL-MCNC: 116 MG/DL (ref 65–100)
GLUCOSE SERPL-MCNC: 116 MG/DL (ref 65–100)
GLUCOSE SERPL-MCNC: 118 MG/DL (ref 65–100)
GLUCOSE SERPL-MCNC: 125 MG/DL (ref 65–100)
GLUCOSE SERPL-MCNC: 126 MG/DL (ref 65–100)
GLUCOSE SERPL-MCNC: 127 MG/DL (ref 65–100)
GLUCOSE SERPL-MCNC: 128 MG/DL (ref 65–100)
GLUCOSE SERPL-MCNC: 131 MG/DL (ref 65–100)
GLUCOSE SERPL-MCNC: 137 MG/DL (ref 65–100)
GLUCOSE SERPL-MCNC: 138 MG/DL (ref 65–100)
GLUCOSE SERPL-MCNC: 139 MG/DL (ref 65–100)
GLUCOSE SERPL-MCNC: 142 MG/DL (ref 65–100)
GLUCOSE SERPL-MCNC: 149 MG/DL (ref 65–100)
GLUCOSE SERPL-MCNC: 153 MG/DL (ref 65–100)
GLUCOSE SERPL-MCNC: 153 MG/DL (ref 65–100)
GLUCOSE SERPL-MCNC: 157 MG/DL (ref 65–100)
GLUCOSE SERPL-MCNC: 160 MG/DL (ref 65–100)
GLUCOSE SERPL-MCNC: 164 MG/DL (ref 65–100)
GLUCOSE SERPL-MCNC: 164 MG/DL (ref 65–100)
GLUCOSE SERPL-MCNC: 167 MG/DL (ref 65–100)
GLUCOSE SERPL-MCNC: 167 MG/DL (ref 65–100)
GLUCOSE SERPL-MCNC: 170 MG/DL (ref 65–100)
GLUCOSE SERPL-MCNC: 173 MG/DL (ref 65–100)
GLUCOSE SERPL-MCNC: 180 MG/DL (ref 65–100)
GLUCOSE SERPL-MCNC: 181 MG/DL (ref 65–100)
GLUCOSE SERPL-MCNC: 183 MG/DL (ref 65–100)
GLUCOSE SERPL-MCNC: 184 MG/DL (ref 65–100)
GLUCOSE SERPL-MCNC: 187 MG/DL (ref 65–100)
GLUCOSE SERPL-MCNC: 190 MG/DL (ref 65–100)
GLUCOSE SERPL-MCNC: 192 MG/DL (ref 65–100)
GLUCOSE SERPL-MCNC: 197 MG/DL (ref 65–100)
GLUCOSE SERPL-MCNC: 212 MG/DL (ref 65–100)
GLUCOSE SERPL-MCNC: 214 MG/DL (ref 65–100)
GLUCOSE SERPL-MCNC: 226 MG/DL (ref 65–100)
GLUCOSE SERPL-MCNC: 269 MG/DL (ref 65–100)
GLUCOSE SERPL-MCNC: 88 MG/DL (ref 65–100)
GLUCOSE SERPL-MCNC: 93 MG/DL (ref 65–100)
GLUCOSE SERPL-MCNC: 96 MG/DL (ref 65–100)
GLUCOSE SERPL-MCNC: 99 MG/DL (ref 65–100)
GLUCOSE UR STRIP.AUTO-MCNC: NEGATIVE MG/DL
GLUCOSE UR STRIP.AUTO-MCNC: NEGATIVE MG/DL
GRAM STN SPEC: ABNORMAL
GRAM STN SPEC: NORMAL
HBA1C MFR BLD: 5.1 % (ref 4–5.6)
HCO3 BLD-SCNC: 21.7 MMOL/L (ref 22–26)
HCO3 BLD-SCNC: 22.6 MMOL/L (ref 22–26)
HCO3 BLD-SCNC: 23 MMOL/L (ref 22–26)
HCO3 BLD-SCNC: 23.2 MMOL/L (ref 22–26)
HCO3 BLD-SCNC: 23.4 MMOL/L (ref 22–26)
HCO3 BLD-SCNC: 23.5 MMOL/L (ref 22–26)
HCO3 BLD-SCNC: 23.5 MMOL/L (ref 22–26)
HCO3 BLD-SCNC: 23.6 MMOL/L (ref 22–26)
HCO3 BLD-SCNC: 23.7 MMOL/L (ref 22–26)
HCO3 BLD-SCNC: 23.8 MMOL/L (ref 22–26)
HCO3 BLD-SCNC: 23.9 MMOL/L (ref 22–26)
HCO3 BLD-SCNC: 24 MMOL/L (ref 22–26)
HCO3 BLD-SCNC: 24 MMOL/L (ref 22–26)
HCO3 BLD-SCNC: 24.1 MMOL/L (ref 22–26)
HCO3 BLD-SCNC: 24.2 MMOL/L (ref 22–26)
HCO3 BLD-SCNC: 24.2 MMOL/L (ref 22–26)
HCO3 BLD-SCNC: 24.3 MMOL/L (ref 22–26)
HCO3 BLD-SCNC: 24.3 MMOL/L (ref 22–26)
HCO3 BLD-SCNC: 24.4 MMOL/L (ref 22–26)
HCO3 BLD-SCNC: 24.5 MMOL/L (ref 22–26)
HCO3 BLD-SCNC: 24.7 MMOL/L (ref 22–26)
HCO3 BLD-SCNC: 24.9 MMOL/L (ref 22–26)
HCO3 BLD-SCNC: 25 MMOL/L (ref 22–26)
HCO3 BLD-SCNC: 25.1 MMOL/L (ref 22–26)
HCO3 BLD-SCNC: 25.1 MMOL/L (ref 22–26)
HCO3 BLD-SCNC: 25.2 MMOL/L (ref 22–26)
HCO3 BLD-SCNC: 25.2 MMOL/L (ref 22–26)
HCO3 BLD-SCNC: 25.3 MMOL/L (ref 22–26)
HCO3 BLD-SCNC: 25.5 MMOL/L (ref 22–26)
HCO3 BLD-SCNC: 25.5 MMOL/L (ref 22–26)
HCO3 BLD-SCNC: 25.6 MMOL/L (ref 22–26)
HCO3 BLD-SCNC: 25.7 MMOL/L (ref 22–26)
HCO3 BLD-SCNC: 25.8 MMOL/L (ref 22–26)
HCO3 BLD-SCNC: 25.8 MMOL/L (ref 22–26)
HCO3 BLD-SCNC: 25.9 MMOL/L (ref 22–26)
HCO3 BLD-SCNC: 26 MMOL/L (ref 22–26)
HCO3 BLD-SCNC: 26.2 MMOL/L (ref 22–26)
HCO3 BLD-SCNC: 26.3 MMOL/L (ref 22–26)
HCO3 BLD-SCNC: 26.4 MMOL/L (ref 22–26)
HCO3 BLD-SCNC: 26.5 MMOL/L (ref 22–26)
HCO3 BLD-SCNC: 26.7 MMOL/L (ref 22–26)
HCO3 BLD-SCNC: 26.9 MMOL/L (ref 22–26)
HCO3 BLD-SCNC: 26.9 MMOL/L (ref 22–26)
HCO3 BLD-SCNC: 27.1 MMOL/L (ref 22–26)
HCO3 BLD-SCNC: 27.2 MMOL/L (ref 22–26)
HCO3 BLD-SCNC: 27.3 MMOL/L (ref 22–26)
HCO3 BLD-SCNC: 27.5 MMOL/L (ref 22–26)
HCO3 BLD-SCNC: 27.5 MMOL/L (ref 22–26)
HCO3 BLD-SCNC: 27.6 MMOL/L (ref 22–26)
HCO3 BLD-SCNC: 27.7 MMOL/L (ref 22–26)
HCO3 BLD-SCNC: 27.7 MMOL/L (ref 22–26)
HCO3 BLD-SCNC: 27.8 MMOL/L (ref 22–26)
HCO3 BLD-SCNC: 27.8 MMOL/L (ref 22–26)
HCO3 BLD-SCNC: 27.9 MMOL/L (ref 22–26)
HCO3 BLD-SCNC: 28 MMOL/L (ref 22–26)
HCO3 BLD-SCNC: 28.1 MMOL/L (ref 22–26)
HCO3 BLD-SCNC: 28.2 MMOL/L (ref 22–26)
HCO3 BLD-SCNC: 28.2 MMOL/L (ref 22–26)
HCO3 BLD-SCNC: 28.3 MMOL/L (ref 22–26)
HCO3 BLD-SCNC: 28.5 MMOL/L (ref 22–26)
HCO3 BLD-SCNC: 28.6 MMOL/L (ref 22–26)
HCO3 BLD-SCNC: 28.7 MMOL/L (ref 22–26)
HCO3 BLD-SCNC: 28.7 MMOL/L (ref 22–26)
HCO3 BLD-SCNC: 28.8 MMOL/L (ref 22–26)
HCO3 BLD-SCNC: 28.9 MMOL/L (ref 22–26)
HCO3 BLD-SCNC: 28.9 MMOL/L (ref 22–26)
HCO3 BLD-SCNC: 29.5 MMOL/L (ref 22–26)
HCO3 BLD-SCNC: 29.5 MMOL/L (ref 22–26)
HCO3 BLD-SCNC: 29.6 MMOL/L (ref 22–26)
HCO3 BLD-SCNC: 29.7 MMOL/L (ref 22–26)
HCO3 BLD-SCNC: 29.9 MMOL/L (ref 22–26)
HCO3 BLD-SCNC: 30 MMOL/L (ref 22–26)
HCO3 BLD-SCNC: 30 MMOL/L (ref 22–26)
HCO3 BLD-SCNC: 30.7 MMOL/L (ref 22–26)
HCO3 BLD-SCNC: 30.7 MMOL/L (ref 22–26)
HCO3 BLD-SCNC: 30.8 MMOL/L (ref 22–26)
HCO3 BLD-SCNC: 30.9 MMOL/L (ref 22–26)
HCO3 BLD-SCNC: 31.1 MMOL/L (ref 22–26)
HCO3 BLD-SCNC: 31.2 MMOL/L (ref 22–26)
HCO3 BLD-SCNC: 31.8 MMOL/L (ref 22–26)
HCO3 BLD-SCNC: 31.9 MMOL/L (ref 22–26)
HCO3 BLD-SCNC: 32.1 MMOL/L (ref 22–26)
HCO3 BLD-SCNC: 32.3 MMOL/L (ref 22–26)
HCO3 BLD-SCNC: 32.5 MMOL/L (ref 22–26)
HCO3 BLD-SCNC: 32.6 MMOL/L (ref 22–26)
HCO3 BLD-SCNC: 33 MMOL/L (ref 22–26)
HCO3 BLD-SCNC: 33.1 MMOL/L (ref 22–26)
HCO3 BLD-SCNC: 33.1 MMOL/L (ref 22–26)
HCO3 BLD-SCNC: 33.2 MMOL/L (ref 22–26)
HCO3 BLD-SCNC: 33.2 MMOL/L (ref 22–26)
HCO3 BLD-SCNC: 33.4 MMOL/L (ref 22–26)
HCO3 BLD-SCNC: 33.5 MMOL/L (ref 22–26)
HCO3 BLD-SCNC: 33.6 MMOL/L (ref 22–26)
HCO3 BLD-SCNC: 33.7 MMOL/L (ref 22–26)
HCO3 BLD-SCNC: 33.8 MMOL/L (ref 22–26)
HCO3 BLD-SCNC: 34.3 MMOL/L (ref 22–26)
HCO3 BLD-SCNC: 34.6 MMOL/L (ref 22–26)
HCO3 BLD-SCNC: 34.6 MMOL/L (ref 22–26)
HCO3 BLD-SCNC: 34.7 MMOL/L (ref 22–26)
HCO3 BLD-SCNC: 34.7 MMOL/L (ref 22–26)
HCO3 BLD-SCNC: 34.8 MMOL/L (ref 22–26)
HCO3 BLD-SCNC: 34.9 MMOL/L (ref 22–26)
HCO3 BLD-SCNC: 34.9 MMOL/L (ref 22–26)
HCO3 BLD-SCNC: 35 MMOL/L (ref 22–26)
HCO3 BLD-SCNC: 35.3 MMOL/L (ref 22–26)
HCO3 BLD-SCNC: 35.4 MMOL/L (ref 22–26)
HCO3 BLD-SCNC: 35.5 MMOL/L (ref 22–26)
HCO3 BLD-SCNC: 35.5 MMOL/L (ref 22–26)
HCO3 BLD-SCNC: 35.9 MMOL/L (ref 22–26)
HCO3 BLD-SCNC: 35.9 MMOL/L (ref 22–26)
HCO3 BLD-SCNC: 36 MMOL/L (ref 22–26)
HCO3 BLD-SCNC: 36.2 MMOL/L (ref 22–26)
HCO3 BLD-SCNC: 36.3 MMOL/L (ref 22–26)
HCO3 BLD-SCNC: 36.7 MMOL/L (ref 22–26)
HCO3 BLD-SCNC: 36.8 MMOL/L (ref 22–26)
HCO3 BLD-SCNC: 36.9 MMOL/L (ref 22–26)
HCO3 BLD-SCNC: 37.1 MMOL/L (ref 22–26)
HCO3 BLD-SCNC: 37.2 MMOL/L (ref 22–26)
HCO3 BLD-SCNC: 37.5 MMOL/L (ref 22–26)
HCO3 BLD-SCNC: 38.3 MMOL/L (ref 22–26)
HCO3 BLD-SCNC: 38.3 MMOL/L (ref 22–26)
HCO3 BLD-SCNC: 38.5 MMOL/L (ref 22–26)
HCO3 BLD-SCNC: 38.9 MMOL/L (ref 22–26)
HCO3 BLD-SCNC: 39.5 MMOL/L (ref 22–26)
HCO3 BLD-SCNC: 40.2 MMOL/L (ref 22–26)
HCO3 BLDA-SCNC: 27 MMOL/L
HCO3 BLDA-SCNC: 30 MMOL/L
HCO3 BLDA-SCNC: 34 MMOL/L
HCO3 BLDA-SCNC: 35 MMOL/L
HCO3 BLDA-SCNC: 37 MMOL/L
HCO3 BLDA-SCNC: 37 MMOL/L
HCO3 BLDA-SCNC: 38 MMOL/L
HCO3 BLDV-SCNC: 22.7 MMOL/L (ref 23–28)
HCO3 BLDV-SCNC: 24.1 MMOL/L (ref 23–28)
HCO3 BLDV-SCNC: 24.4 MMOL/L (ref 23–28)
HCO3 BLDV-SCNC: 25 MMOL/L (ref 23–28)
HCO3 BLDV-SCNC: 25 MMOL/L (ref 23–28)
HCO3 BLDV-SCNC: 26.3 MMOL/L (ref 23–28)
HCO3 BLDV-SCNC: 26.5 MMOL/L (ref 23–28)
HCO3 BLDV-SCNC: 27 MMOL/L (ref 23–28)
HCO3 BLDV-SCNC: 27.4 MMOL/L (ref 23–28)
HCO3 BLDV-SCNC: 27.5 MMOL/L (ref 23–28)
HCO3 BLDV-SCNC: 28 MMOL/L (ref 23–28)
HCO3 BLDV-SCNC: 28.3 MMOL/L (ref 23–28)
HCO3 BLDV-SCNC: 28.9 MMOL/L (ref 23–28)
HCO3 BLDV-SCNC: 28.9 MMOL/L (ref 23–28)
HCO3 BLDV-SCNC: 29.2 MMOL/L (ref 23–28)
HCO3 BLDV-SCNC: 30 MMOL/L (ref 23–28)
HCO3 BLDV-SCNC: 31 MMOL/L (ref 23–28)
HCO3 BLDV-SCNC: 31.3 MMOL/L (ref 23–28)
HCO3 BLDV-SCNC: 31.6 MMOL/L (ref 23–28)
HCO3 BLDV-SCNC: 33.3 MMOL/L (ref 23–28)
HCO3 BLDV-SCNC: 34 MMOL/L (ref 23–28)
HCO3 BLDV-SCNC: 34.8 MMOL/L (ref 23–28)
HCO3 BLDV-SCNC: 35.2 MMOL/L (ref 23–28)
HCO3 BLDV-SCNC: 35.5 MMOL/L (ref 23–28)
HCO3 BLDV-SCNC: 35.8 MMOL/L (ref 23–28)
HCO3 BLDV-SCNC: 36 MMOL/L (ref 23–28)
HCO3 BLDV-SCNC: 36.6 MMOL/L (ref 23–28)
HCO3 BLDV-SCNC: 36.8 MMOL/L (ref 23–28)
HCO3 BLDV-SCNC: 37 MMOL/L (ref 23–28)
HCT VFR BLD AUTO: 20.4 % (ref 36.6–50.3)
HCT VFR BLD AUTO: 20.4 % (ref 36.6–50.3)
HCT VFR BLD AUTO: 22.2 % (ref 36.6–50.3)
HCT VFR BLD AUTO: 22.8 % (ref 36.6–50.3)
HCT VFR BLD AUTO: 23 % (ref 36.6–50.3)
HCT VFR BLD AUTO: 23.3 % (ref 36.6–50.3)
HCT VFR BLD AUTO: 23.6 % (ref 36.6–50.3)
HCT VFR BLD AUTO: 23.8 % (ref 36.6–50.3)
HCT VFR BLD AUTO: 23.8 % (ref 36.6–50.3)
HCT VFR BLD AUTO: 23.9 % (ref 36.6–50.3)
HCT VFR BLD AUTO: 24.1 % (ref 36.6–50.3)
HCT VFR BLD AUTO: 24.4 % (ref 36.6–50.3)
HCT VFR BLD AUTO: 24.4 % (ref 36.6–50.3)
HCT VFR BLD AUTO: 24.5 % (ref 36.6–50.3)
HCT VFR BLD AUTO: 24.8 % (ref 36.6–50.3)
HCT VFR BLD AUTO: 24.9 % (ref 36.6–50.3)
HCT VFR BLD AUTO: 25 % (ref 36.6–50.3)
HCT VFR BLD AUTO: 25.3 % (ref 36.6–50.3)
HCT VFR BLD AUTO: 25.5 % (ref 36.6–50.3)
HCT VFR BLD AUTO: 25.6 % (ref 36.6–50.3)
HCT VFR BLD AUTO: 25.7 % (ref 36.6–50.3)
HCT VFR BLD AUTO: 25.8 % (ref 36.6–50.3)
HCT VFR BLD AUTO: 25.9 % (ref 36.6–50.3)
HCT VFR BLD AUTO: 26 % (ref 36.6–50.3)
HCT VFR BLD AUTO: 26.1 % (ref 36.6–50.3)
HCT VFR BLD AUTO: 26.1 % (ref 36.6–50.3)
HCT VFR BLD AUTO: 26.2 % (ref 36.6–50.3)
HCT VFR BLD AUTO: 26.2 % (ref 36.6–50.3)
HCT VFR BLD AUTO: 26.5 % (ref 36.6–50.3)
HCT VFR BLD AUTO: 26.6 % (ref 36.6–50.3)
HCT VFR BLD AUTO: 26.8 % (ref 36.6–50.3)
HCT VFR BLD AUTO: 26.9 % (ref 36.6–50.3)
HCT VFR BLD AUTO: 27.1 % (ref 36.6–50.3)
HCT VFR BLD AUTO: 27.2 % (ref 36.6–50.3)
HCT VFR BLD AUTO: 27.3 % (ref 36.6–50.3)
HCT VFR BLD AUTO: 28 % (ref 36.6–50.3)
HCT VFR BLD AUTO: 28.6 % (ref 36.6–50.3)
HCT VFR BLD AUTO: 29 % (ref 36.6–50.3)
HCT VFR BLD AUTO: 30.5 % (ref 36.6–50.3)
HCT VFR BLD AUTO: 32.4 % (ref 36.6–50.3)
HCT VFR BLD AUTO: 40.5 % (ref 36.6–50.3)
HCT VFR BLD AUTO: 46.5 % (ref 36.6–50.3)
HDLC SERPL-MCNC: 9 MG/DL
HDLC SERPL: 8.3 (ref 0–5)
HGB BLD-MCNC: 10.5 G/DL (ref 12.1–17)
HGB BLD-MCNC: 11.1 G/DL (ref 12.1–17)
HGB BLD-MCNC: 14 G/DL (ref 12.1–17)
HGB BLD-MCNC: 15.2 G/DL (ref 12.1–17)
HGB BLD-MCNC: 6.2 G/DL (ref 12.1–17)
HGB BLD-MCNC: 6.3 G/DL (ref 12.1–17)
HGB BLD-MCNC: 6.6 G/DL (ref 12.1–17)
HGB BLD-MCNC: 6.7 G/DL (ref 12.1–17)
HGB BLD-MCNC: 6.8 G/DL (ref 12.1–17)
HGB BLD-MCNC: 7 G/DL (ref 12.1–17)
HGB BLD-MCNC: 7 G/DL (ref 12.1–17)
HGB BLD-MCNC: 7.1 G/DL (ref 12.1–17)
HGB BLD-MCNC: 7.1 G/DL (ref 12.1–17)
HGB BLD-MCNC: 7.2 G/DL (ref 12.1–17)
HGB BLD-MCNC: 7.2 G/DL (ref 12.1–17)
HGB BLD-MCNC: 7.3 G/DL (ref 12.1–17)
HGB BLD-MCNC: 7.4 G/DL (ref 12.1–17)
HGB BLD-MCNC: 7.5 G/DL (ref 12.1–17)
HGB BLD-MCNC: 7.7 G/DL (ref 12.1–17)
HGB BLD-MCNC: 8 G/DL (ref 12.1–17)
HGB BLD-MCNC: 8.2 G/DL (ref 12.1–17)
HGB BLD-MCNC: 8.3 G/DL (ref 12.1–17)
HGB BLD-MCNC: 8.3 G/DL (ref 12.1–17)
HGB BLD-MCNC: 8.4 G/DL (ref 12.1–17)
HGB BLD-MCNC: 8.5 G/DL (ref 12.1–17)
HGB BLD-MCNC: 8.8 G/DL (ref 12.1–17)
HGB BLD-MCNC: 9.2 G/DL (ref 12.1–17)
HGB BLD-MCNC: 9.3 G/DL (ref 12.1–17)
HGB BLD-MCNC: 9.4 G/DL (ref 12.1–17)
HGB BLD-MCNC: 9.5 G/DL (ref 12.1–17)
HGB UR QL STRIP: ABNORMAL
HGB UR QL STRIP: ABNORMAL
HISTORY CHECK: NORMAL
IMM GRANULOCYTES # BLD AUTO: 0 K/UL
IMM GRANULOCYTES NFR BLD AUTO: 0 %
INR PPP: 1.1 (ref 0.9–1.1)
INR PPP: 1.5 (ref 0.9–1.1)
INR PPP: 1.6 (ref 0.9–1.1)
KETONES UR QL STRIP.AUTO: NEGATIVE MG/DL
KETONES UR QL STRIP.AUTO: NEGATIVE MG/DL
LACTATE SERPL-SCNC: 0.9 MMOL/L (ref 0.4–2)
LACTATE SERPL-SCNC: 1 MMOL/L (ref 0.4–2)
LACTATE SERPL-SCNC: 1.1 MMOL/L (ref 0.4–2)
LACTATE SERPL-SCNC: 1.2 MMOL/L (ref 0.4–2)
LACTATE SERPL-SCNC: 1.2 MMOL/L (ref 0.4–2)
LACTATE SERPL-SCNC: 1.3 MMOL/L (ref 0.4–2)
LACTATE SERPL-SCNC: 1.5 MMOL/L (ref 0.4–2)
LACTATE SERPL-SCNC: 1.6 MMOL/L (ref 0.4–2)
LACTATE SERPL-SCNC: 1.6 MMOL/L (ref 0.4–2)
LACTATE SERPL-SCNC: 1.7 MMOL/L (ref 0.4–2)
LACTATE SERPL-SCNC: 1.7 MMOL/L (ref 0.4–2)
LACTATE SERPL-SCNC: 1.8 MMOL/L (ref 0.4–2)
LACTATE SERPL-SCNC: 1.9 MMOL/L (ref 0.4–2)
LACTATE SERPL-SCNC: 2.1 MMOL/L (ref 0.4–2)
LACTATE SERPL-SCNC: 2.1 MMOL/L (ref 0.4–2)
LACTATE SERPL-SCNC: 2.5 MMOL/L (ref 0.4–2)
LACTATE SERPL-SCNC: 2.6 MMOL/L (ref 0.4–2)
LACTATE SERPL-SCNC: 2.7 MMOL/L (ref 0.4–2)
LACTATE SERPL-SCNC: 2.9 MMOL/L (ref 0.4–2)
LACTATE SERPL-SCNC: 3.1 MMOL/L (ref 0.4–2)
LACTATE SERPL-SCNC: 3.2 MMOL/L (ref 0.4–2)
LACTATE SERPL-SCNC: 3.5 MMOL/L (ref 0.4–2)
LACTATE SERPL-SCNC: 3.5 MMOL/L (ref 0.4–2)
LACTATE SERPL-SCNC: 3.6 MMOL/L (ref 0.4–2)
LACTATE SERPL-SCNC: 3.9 MMOL/L (ref 0.4–2)
LDH SERPL L TO P-CCNC: 531 U/L (ref 85–241)
LDH SERPL L TO P-CCNC: 571 U/L (ref 85–241)
LDH SERPL L TO P-CCNC: 582 U/L (ref 85–241)
LDH SERPL L TO P-CCNC: 588 U/L (ref 85–241)
LDH SERPL L TO P-CCNC: 590 U/L (ref 85–241)
LDH SERPL L TO P-CCNC: 591 U/L (ref 85–241)
LDH SERPL L TO P-CCNC: 603 U/L (ref 85–241)
LDH SERPL L TO P-CCNC: 603 U/L (ref 85–241)
LDH SERPL L TO P-CCNC: 721 U/L (ref 85–241)
LDLC SERPL CALC-MCNC: ABNORMAL MG/DL (ref 0–100)
LDLC SERPL DIRECT ASSAY-MCNC: 29 MG/DL (ref 0–100)
LEUKOCYTE ESTERASE UR QL STRIP.AUTO: ABNORMAL
LEUKOCYTE ESTERASE UR QL STRIP.AUTO: ABNORMAL
LYMPHOCYTES # BLD: 1.5 K/UL (ref 0.8–3.5)
LYMPHOCYTES # BLD: 4.2 K/UL (ref 0.8–3.5)
LYMPHOCYTES # BLD: 5.9 K/UL (ref 0.8–3.5)
LYMPHOCYTES NFR BLD: 13 % (ref 12–49)
LYMPHOCYTES NFR BLD: 18 % (ref 12–49)
LYMPHOCYTES NFR BLD: 9 % (ref 12–49)
Lab: ABNORMAL
MAGNESIUM SERPL-MCNC: 2 MG/DL (ref 1.6–2.4)
MAGNESIUM SERPL-MCNC: 2.2 MG/DL (ref 1.6–2.4)
MAGNESIUM SERPL-MCNC: 2.3 MG/DL (ref 1.6–2.4)
MAGNESIUM SERPL-MCNC: 2.4 MG/DL (ref 1.6–2.4)
MAGNESIUM SERPL-MCNC: 2.5 MG/DL (ref 1.6–2.4)
MAGNESIUM SERPL-MCNC: 2.6 MG/DL (ref 1.6–2.4)
MAGNESIUM SERPL-MCNC: 2.7 MG/DL (ref 1.6–2.4)
MAGNESIUM SERPL-MCNC: 2.8 MG/DL (ref 1.6–2.4)
MAGNESIUM SERPL-MCNC: 2.9 MG/DL (ref 1.6–2.4)
MCH RBC QN AUTO: 27.8 PG (ref 26–34)
MCH RBC QN AUTO: 27.9 PG (ref 26–34)
MCH RBC QN AUTO: 28.4 PG (ref 26–34)
MCH RBC QN AUTO: 28.5 PG (ref 26–34)
MCH RBC QN AUTO: 28.5 PG (ref 26–34)
MCH RBC QN AUTO: 28.6 PG (ref 26–34)
MCH RBC QN AUTO: 28.6 PG (ref 26–34)
MCH RBC QN AUTO: 28.7 PG (ref 26–34)
MCH RBC QN AUTO: 28.7 PG (ref 26–34)
MCH RBC QN AUTO: 28.8 PG (ref 26–34)
MCH RBC QN AUTO: 28.9 PG (ref 26–34)
MCH RBC QN AUTO: 29 PG (ref 26–34)
MCH RBC QN AUTO: 29.1 PG (ref 26–34)
MCH RBC QN AUTO: 29.2 PG (ref 26–34)
MCH RBC QN AUTO: 29.3 PG (ref 26–34)
MCH RBC QN AUTO: 29.3 PG (ref 26–34)
MCH RBC QN AUTO: 30 PG (ref 26–34)
MCHC RBC AUTO-ENTMCNC: 28.6 G/DL (ref 30–36.5)
MCHC RBC AUTO-ENTMCNC: 28.6 G/DL (ref 30–36.5)
MCHC RBC AUTO-ENTMCNC: 28.7 G/DL (ref 30–36.5)
MCHC RBC AUTO-ENTMCNC: 28.9 G/DL (ref 30–36.5)
MCHC RBC AUTO-ENTMCNC: 29.2 G/DL (ref 30–36.5)
MCHC RBC AUTO-ENTMCNC: 29.4 G/DL (ref 30–36.5)
MCHC RBC AUTO-ENTMCNC: 29.6 G/DL (ref 30–36.5)
MCHC RBC AUTO-ENTMCNC: 29.8 G/DL (ref 30–36.5)
MCHC RBC AUTO-ENTMCNC: 30.2 G/DL (ref 30–36.5)
MCHC RBC AUTO-ENTMCNC: 30.3 G/DL (ref 30–36.5)
MCHC RBC AUTO-ENTMCNC: 30.5 G/DL (ref 30–36.5)
MCHC RBC AUTO-ENTMCNC: 30.8 G/DL (ref 30–36.5)
MCHC RBC AUTO-ENTMCNC: 30.8 G/DL (ref 30–36.5)
MCHC RBC AUTO-ENTMCNC: 30.9 G/DL (ref 30–36.5)
MCHC RBC AUTO-ENTMCNC: 30.9 G/DL (ref 30–36.5)
MCHC RBC AUTO-ENTMCNC: 31.1 G/DL (ref 30–36.5)
MCHC RBC AUTO-ENTMCNC: 31.3 G/DL (ref 30–36.5)
MCHC RBC AUTO-ENTMCNC: 31.4 G/DL (ref 30–36.5)
MCHC RBC AUTO-ENTMCNC: 31.8 G/DL (ref 30–36.5)
MCHC RBC AUTO-ENTMCNC: 32.1 G/DL (ref 30–36.5)
MCHC RBC AUTO-ENTMCNC: 32.2 G/DL (ref 30–36.5)
MCHC RBC AUTO-ENTMCNC: 32.7 G/DL (ref 30–36.5)
MCHC RBC AUTO-ENTMCNC: 33.3 G/DL (ref 30–36.5)
MCHC RBC AUTO-ENTMCNC: 33.6 G/DL (ref 30–36.5)
MCHC RBC AUTO-ENTMCNC: 34.3 G/DL (ref 30–36.5)
MCHC RBC AUTO-ENTMCNC: 34.4 G/DL (ref 30–36.5)
MCHC RBC AUTO-ENTMCNC: 34.6 G/DL (ref 30–36.5)
MCV RBC AUTO: 100.4 FL (ref 80–99)
MCV RBC AUTO: 100.4 FL (ref 80–99)
MCV RBC AUTO: 100.8 FL (ref 80–99)
MCV RBC AUTO: 83.3 FL (ref 80–99)
MCV RBC AUTO: 83.7 FL (ref 80–99)
MCV RBC AUTO: 84 FL (ref 80–99)
MCV RBC AUTO: 84.8 FL (ref 80–99)
MCV RBC AUTO: 85.3 FL (ref 80–99)
MCV RBC AUTO: 86.5 FL (ref 80–99)
MCV RBC AUTO: 86.8 FL (ref 80–99)
MCV RBC AUTO: 89.7 FL (ref 80–99)
MCV RBC AUTO: 90.3 FL (ref 80–99)
MCV RBC AUTO: 90.9 FL (ref 80–99)
MCV RBC AUTO: 91 FL (ref 80–99)
MCV RBC AUTO: 91.6 FL (ref 80–99)
MCV RBC AUTO: 92.5 FL (ref 80–99)
MCV RBC AUTO: 93.5 FL (ref 80–99)
MCV RBC AUTO: 93.7 FL (ref 80–99)
MCV RBC AUTO: 94.3 FL (ref 80–99)
MCV RBC AUTO: 94.4 FL (ref 80–99)
MCV RBC AUTO: 94.5 FL (ref 80–99)
MCV RBC AUTO: 94.9 FL (ref 80–99)
MCV RBC AUTO: 95.2 FL (ref 80–99)
MCV RBC AUTO: 95.7 FL (ref 80–99)
MCV RBC AUTO: 97 FL (ref 80–99)
MCV RBC AUTO: 97.3 FL (ref 80–99)
MCV RBC AUTO: 97.4 FL (ref 80–99)
MCV RBC AUTO: 97.5 FL (ref 80–99)
MCV RBC AUTO: 98.8 FL (ref 80–99)
METAMYELOCYTES NFR BLD MANUAL: 2 %
METHGB MFR BLD: 0.2 % (ref 0–1.4)
METHGB MFR BLD: 0.3 % (ref 0–1.4)
MONOCYTES # BLD: 0.2 K/UL (ref 0–1)
MONOCYTES # BLD: 1 K/UL (ref 0–1)
MONOCYTES # BLD: 1.6 K/UL (ref 0–1)
MONOCYTES NFR BLD: 1 % (ref 5–13)
MONOCYTES NFR BLD: 3 % (ref 5–13)
MONOCYTES NFR BLD: 5 % (ref 5–13)
MYELOCYTES NFR BLD MANUAL: 1 %
MYELOCYTES NFR BLD MANUAL: 1 %
NEUTS SEG # BLD: 13.8 K/UL (ref 1.8–8)
NEUTS SEG # BLD: 24.3 K/UL (ref 1.8–8)
NEUTS SEG # BLD: 27.3 K/UL (ref 1.8–8)
NEUTS SEG NFR BLD: 74 % (ref 32–75)
NEUTS SEG NFR BLD: 84 % (ref 32–75)
NEUTS SEG NFR BLD: 84 % (ref 32–75)
NITRITE UR QL STRIP.AUTO: NEGATIVE
NITRITE UR QL STRIP.AUTO: NEGATIVE
NRBC # BLD: 0 K/UL (ref 0–0.01)
NRBC # BLD: 0.04 K/UL (ref 0–0.01)
NRBC # BLD: 0.04 K/UL (ref 0–0.01)
NRBC # BLD: 0.06 K/UL (ref 0–0.01)
NRBC # BLD: 0.17 K/UL (ref 0–0.01)
NRBC # BLD: 0.3 K/UL (ref 0–0.01)
NRBC # BLD: 0.32 K/UL (ref 0–0.01)
NRBC # BLD: 0.33 K/UL (ref 0–0.01)
NRBC # BLD: 0.42 K/UL (ref 0–0.01)
NRBC # BLD: 0.44 K/UL (ref 0–0.01)
NRBC # BLD: 0.48 K/UL (ref 0–0.01)
NRBC # BLD: 0.51 K/UL (ref 0–0.01)
NRBC # BLD: 0.52 K/UL (ref 0–0.01)
NRBC # BLD: 0.52 K/UL (ref 0–0.01)
NRBC # BLD: 0.53 K/UL (ref 0–0.01)
NRBC # BLD: 0.65 K/UL (ref 0–0.01)
NRBC # BLD: 0.8 K/UL (ref 0–0.01)
NRBC # BLD: 10.43 K/UL (ref 0–0.01)
NRBC # BLD: 2.51 K/UL (ref 0–0.01)
NRBC # BLD: 2.81 K/UL (ref 0–0.01)
NRBC # BLD: 7.24 K/UL (ref 0–0.01)
NRBC # BLD: 8.14 K/UL (ref 0–0.01)
NRBC # BLD: 8.23 K/UL (ref 0–0.01)
NRBC # BLD: 8.86 K/UL (ref 0–0.01)
NRBC # BLD: 9.11 K/UL (ref 0–0.01)
NRBC BLD-RTO: 0 PER 100 WBC
NRBC BLD-RTO: 0.3 PER 100 WBC
NRBC BLD-RTO: 0.4 PER 100 WBC
NRBC BLD-RTO: 0.5 PER 100 WBC
NRBC BLD-RTO: 1.1 PER 100 WBC
NRBC BLD-RTO: 1.8 PER 100 WBC
NRBC BLD-RTO: 11.5 PER 100 WBC
NRBC BLD-RTO: 11.9 PER 100 WBC
NRBC BLD-RTO: 2.3 PER 100 WBC
NRBC BLD-RTO: 2.5 PER 100 WBC
NRBC BLD-RTO: 2.7 PER 100 WBC
NRBC BLD-RTO: 2.7 PER 100 WBC
NRBC BLD-RTO: 23.7 PER 100 WBC
NRBC BLD-RTO: 25.3 PER 100 WBC
NRBC BLD-RTO: 25.9 PER 100 WBC
NRBC BLD-RTO: 26.1 PER 100 WBC
NRBC BLD-RTO: 27.8 PER 100 WBC
NRBC BLD-RTO: 3.3 PER 100 WBC
NRBC BLD-RTO: 3.4 PER 100 WBC
NRBC BLD-RTO: 3.7 PER 100 WBC
NRBC BLD-RTO: 3.7 PER 100 WBC
NRBC BLD-RTO: 30.6 PER 100 WBC
NRBC BLD-RTO: 4 PER 100 WBC
NRBC BLD-RTO: 5.4 PER 100 WBC
NRBC BLD-RTO: 5.7 PER 100 WBC
NT PRO BNP: 7496 PG/ML
O2/TOTAL GAS SETTING VFR VENT: 100 %
O2/TOTAL GAS SETTING VFR VENT: 30 %
O2/TOTAL GAS SETTING VFR VENT: 30 %
O2/TOTAL GAS SETTING VFR VENT: 40 %
O2/TOTAL GAS SETTING VFR VENT: 50 %
O2/TOTAL GAS SETTING VFR VENT: 80 %
OXYHGB MFR BLD: 38.1 % (ref 94–97)
OXYHGB MFR BLD: 44.6 % (ref 94–97)
OXYHGB MFR BLD: 49.3 % (ref 94–97)
OXYHGB MFR BLD: 61.5 % (ref 94–97)
PCO2 BLD: 26.5 MMHG (ref 35–45)
PCO2 BLD: 26.6 MMHG (ref 35–45)
PCO2 BLD: 27.3 MMHG (ref 35–45)
PCO2 BLD: 27.3 MMHG (ref 35–45)
PCO2 BLD: 28.3 MMHG (ref 35–45)
PCO2 BLD: 28.4 MMHG (ref 35–45)
PCO2 BLD: 28.8 MMHG (ref 35–45)
PCO2 BLD: 28.8 MMHG (ref 35–45)
PCO2 BLD: 29 MMHG (ref 35–45)
PCO2 BLD: 29.1 MMHG (ref 35–45)
PCO2 BLD: 29.4 MMHG (ref 35–45)
PCO2 BLD: 29.7 MMHG (ref 35–45)
PCO2 BLD: 30.1 MMHG (ref 35–45)
PCO2 BLD: 30.2 MMHG (ref 35–45)
PCO2 BLD: 30.4 MMHG (ref 35–45)
PCO2 BLD: 30.5 MMHG (ref 35–45)
PCO2 BLD: 30.6 MMHG (ref 35–45)
PCO2 BLD: 30.7 MMHG (ref 35–45)
PCO2 BLD: 31 MMHG (ref 35–45)
PCO2 BLD: 31.1 MMHG (ref 35–45)
PCO2 BLD: 31.1 MMHG (ref 35–45)
PCO2 BLD: 31.2 MMHG (ref 35–45)
PCO2 BLD: 31.5 MMHG (ref 35–45)
PCO2 BLD: 31.9 MMHG (ref 35–45)
PCO2 BLD: 31.9 MMHG (ref 35–45)
PCO2 BLD: 32.2 MMHG (ref 35–45)
PCO2 BLD: 32.3 MMHG (ref 35–45)
PCO2 BLD: 32.5 MMHG (ref 35–45)
PCO2 BLD: 32.7 MMHG (ref 35–45)
PCO2 BLD: 32.7 MMHG (ref 35–45)
PCO2 BLD: 32.8 MMHG (ref 35–45)
PCO2 BLD: 32.8 MMHG (ref 35–45)
PCO2 BLD: 32.9 MMHG (ref 35–45)
PCO2 BLD: 33.2 MMHG (ref 35–45)
PCO2 BLD: 33.3 MMHG (ref 35–45)
PCO2 BLD: 33.3 MMHG (ref 35–45)
PCO2 BLD: 33.4 MMHG (ref 35–45)
PCO2 BLD: 33.5 MMHG (ref 35–45)
PCO2 BLD: 33.5 MMHG (ref 35–45)
PCO2 BLD: 33.7 MMHG (ref 35–45)
PCO2 BLD: 33.8 MMHG (ref 35–45)
PCO2 BLD: 33.9 MMHG (ref 35–45)
PCO2 BLD: 33.9 MMHG (ref 35–45)
PCO2 BLD: 34 MMHG (ref 35–45)
PCO2 BLD: 34.3 MMHG (ref 35–45)
PCO2 BLD: 34.4 MMHG (ref 35–45)
PCO2 BLD: 34.5 MMHG (ref 35–45)
PCO2 BLD: 34.9 MMHG (ref 35–45)
PCO2 BLD: 34.9 MMHG (ref 35–45)
PCO2 BLD: 35 MMHG (ref 35–45)
PCO2 BLD: 35 MMHG (ref 35–45)
PCO2 BLD: 35.2 MMHG (ref 35–45)
PCO2 BLD: 35.3 MMHG (ref 35–45)
PCO2 BLD: 35.6 MMHG (ref 35–45)
PCO2 BLD: 35.7 MMHG (ref 35–45)
PCO2 BLD: 35.8 MMHG (ref 35–45)
PCO2 BLD: 35.8 MMHG (ref 35–45)
PCO2 BLD: 36.1 MMHG (ref 35–45)
PCO2 BLD: 36.3 MMHG (ref 35–45)
PCO2 BLD: 36.4 MMHG (ref 35–45)
PCO2 BLD: 36.5 MMHG (ref 35–45)
PCO2 BLD: 36.6 MMHG (ref 35–45)
PCO2 BLD: 36.7 MMHG (ref 35–45)
PCO2 BLD: 36.7 MMHG (ref 35–45)
PCO2 BLD: 36.8 MMHG (ref 35–45)
PCO2 BLD: 37.1 MMHG (ref 35–45)
PCO2 BLD: 37.3 MMHG (ref 35–45)
PCO2 BLD: 37.5 MMHG (ref 35–45)
PCO2 BLD: 37.7 MMHG (ref 35–45)
PCO2 BLD: 37.9 MMHG (ref 35–45)
PCO2 BLD: 38 MMHG (ref 35–45)
PCO2 BLD: 38.1 MMHG (ref 35–45)
PCO2 BLD: 38.3 MMHG (ref 35–45)
PCO2 BLD: 39.1 MMHG (ref 35–45)
PCO2 BLD: 39.3 MMHG (ref 35–45)
PCO2 BLD: 39.4 MMHG (ref 35–45)
PCO2 BLD: 39.4 MMHG (ref 35–45)
PCO2 BLD: 39.6 MMHG (ref 35–45)
PCO2 BLD: 39.7 MMHG (ref 35–45)
PCO2 BLD: 39.8 MMHG (ref 35–45)
PCO2 BLD: 40 MMHG (ref 35–45)
PCO2 BLD: 40.5 MMHG (ref 35–45)
PCO2 BLD: 40.6 MMHG (ref 35–45)
PCO2 BLD: 40.6 MMHG (ref 35–45)
PCO2 BLD: 40.7 MMHG (ref 35–45)
PCO2 BLD: 40.8 MMHG (ref 35–45)
PCO2 BLD: 41.3 MMHG (ref 35–45)
PCO2 BLD: 41.5 MMHG (ref 35–45)
PCO2 BLD: 41.5 MMHG (ref 35–45)
PCO2 BLD: 41.7 MMHG (ref 35–45)
PCO2 BLD: 41.8 MMHG (ref 35–45)
PCO2 BLD: 42.2 MMHG (ref 35–45)
PCO2 BLD: 42.3 MMHG (ref 35–45)
PCO2 BLD: 42.4 MMHG (ref 35–45)
PCO2 BLD: 42.6 MMHG (ref 35–45)
PCO2 BLD: 42.7 MMHG (ref 35–45)
PCO2 BLD: 42.7 MMHG (ref 35–45)
PCO2 BLD: 42.8 MMHG (ref 35–45)
PCO2 BLD: 43 MMHG (ref 35–45)
PCO2 BLD: 43 MMHG (ref 35–45)
PCO2 BLD: 43.2 MMHG (ref 35–45)
PCO2 BLD: 43.4 MMHG (ref 35–45)
PCO2 BLD: 43.5 MMHG (ref 35–45)
PCO2 BLD: 43.6 MMHG (ref 35–45)
PCO2 BLD: 43.6 MMHG (ref 35–45)
PCO2 BLD: 43.9 MMHG (ref 35–45)
PCO2 BLD: 44 MMHG (ref 35–45)
PCO2 BLD: 44 MMHG (ref 35–45)
PCO2 BLD: 44.4 MMHG (ref 35–45)
PCO2 BLD: 44.5 MMHG (ref 35–45)
PCO2 BLD: 45 MMHG (ref 35–45)
PCO2 BLD: 45 MMHG (ref 35–45)
PCO2 BLD: 45.2 MMHG (ref 35–45)
PCO2 BLD: 45.3 MMHG (ref 35–45)
PCO2 BLD: 45.5 MMHG (ref 35–45)
PCO2 BLD: 45.5 MMHG (ref 35–45)
PCO2 BLD: 45.6 MMHG (ref 35–45)
PCO2 BLD: 45.7 MMHG (ref 35–45)
PCO2 BLD: 45.7 MMHG (ref 35–45)
PCO2 BLD: 45.9 MMHG (ref 35–45)
PCO2 BLD: 46.1 MMHG (ref 35–45)
PCO2 BLD: 46.6 MMHG (ref 35–45)
PCO2 BLD: 46.8 MMHG (ref 35–45)
PCO2 BLD: 46.9 MMHG (ref 35–45)
PCO2 BLD: 47.6 MMHG (ref 35–45)
PCO2 BLD: 47.8 MMHG (ref 35–45)
PCO2 BLD: 47.9 MMHG (ref 35–45)
PCO2 BLD: 48 MMHG (ref 35–45)
PCO2 BLD: 48.3 MMHG (ref 35–45)
PCO2 BLD: 48.4 MMHG (ref 35–45)
PCO2 BLD: 48.6 MMHG (ref 35–45)
PCO2 BLD: 48.8 MMHG (ref 35–45)
PCO2 BLD: 48.8 MMHG (ref 35–45)
PCO2 BLD: 49 MMHG (ref 35–45)
PCO2 BLD: 49.2 MMHG (ref 35–45)
PCO2 BLD: 50.2 MMHG (ref 35–45)
PCO2 BLD: 50.7 MMHG (ref 35–45)
PCO2 BLD: 50.8 MMHG (ref 35–45)
PCO2 BLD: 51 MMHG (ref 35–45)
PCO2 BLD: 51.5 MMHG (ref 35–45)
PCO2 BLD: 51.5 MMHG (ref 35–45)
PCO2 BLD: 51.6 MMHG (ref 35–45)
PCO2 BLD: 52.3 MMHG (ref 35–45)
PCO2 BLD: 52.7 MMHG (ref 35–45)
PCO2 BLD: 52.7 MMHG (ref 35–45)
PCO2 BLD: 54.5 MMHG (ref 35–45)
PCO2 BLD: 54.9 MMHG (ref 35–45)
PCO2 BLDC: 31.8 MMHG (ref 45–55)
PCO2 BLDV: 30.2 MMHG (ref 41–51)
PCO2 BLDV: 31.9 MMHG (ref 41–51)
PCO2 BLDV: 32.1 MMHG (ref 41–51)
PCO2 BLDV: 34.3 MMHG (ref 41–51)
PCO2 BLDV: 35.9 MMHG (ref 41–51)
PCO2 BLDV: 38 MMHG (ref 41–51)
PCO2 BLDV: 38.1 MMHG (ref 41–51)
PCO2 BLDV: 38.3 MMHG (ref 41–51)
PCO2 BLDV: 39 MMHG (ref 41–51)
PCO2 BLDV: 40.7 MMHG (ref 41–51)
PCO2 BLDV: 40.7 MMHG (ref 41–51)
PCO2 BLDV: 41.1 MMHG (ref 41–51)
PCO2 BLDV: 42.1 MMHG (ref 41–51)
PCO2 BLDV: 42.1 MMHG (ref 41–51)
PCO2 BLDV: 42.5 MMHG (ref 41–51)
PCO2 BLDV: 43 MMHG (ref 41–51)
PCO2 BLDV: 43 MMHG (ref 41–51)
PCO2 BLDV: 44.4 MMHG (ref 41–51)
PCO2 BLDV: 45.2 MMHG (ref 41–51)
PCO2 BLDV: 45.3 MMHG (ref 41–51)
PCO2 BLDV: 45.7 MMHG (ref 41–51)
PCO2 BLDV: 45.9 MMHG (ref 41–51)
PCO2 BLDV: 46.3 MMHG (ref 41–51)
PCO2 BLDV: 46.4 MMHG (ref 41–51)
PCO2 BLDV: 46.5 MMHG (ref 41–51)
PCO2 BLDV: 47.8 MMHG (ref 41–51)
PCO2 BLDV: 48.1 MMHG (ref 41–51)
PCO2 BLDV: 49.2 MMHG (ref 41–51)
PCO2 BLDV: 52.1 MMHG (ref 41–51)
PCO2 BLDV: 54.3 MMHG (ref 41–51)
PCO2 BLDV: 55.3 MMHG (ref 41–51)
PCO2 BLDV: 56 MMHG (ref 41–51)
PCO2 BLDV: 57.5 MMHG (ref 41–51)
PEEP RESPIRATORY: 3
PEEP RESPIRATORY: 3
PEEP RESPIRATORY: 3 CMH2O
PEEP RESPIRATORY: 3 CMH2O
PEEP RESPIRATORY: 4 CMH2O
PEEP RESPIRATORY: 5
PEEP RESPIRATORY: 6 CMH2O
PH BLD: 7.3 (ref 7.35–7.45)
PH BLD: 7.32 (ref 7.35–7.45)
PH BLD: 7.35 (ref 7.35–7.45)
PH BLD: 7.37 (ref 7.35–7.45)
PH BLD: 7.38 (ref 7.35–7.45)
PH BLD: 7.38 (ref 7.35–7.45)
PH BLD: 7.39 (ref 7.35–7.45)
PH BLD: 7.4 (ref 7.35–7.45)
PH BLD: 7.4 (ref 7.35–7.45)
PH BLD: 7.41 (ref 7.35–7.45)
PH BLD: 7.42 (ref 7.35–7.45)
PH BLD: 7.42 (ref 7.35–7.45)
PH BLD: 7.43 (ref 7.35–7.45)
PH BLD: 7.44 (ref 7.35–7.45)
PH BLD: 7.45 (ref 7.35–7.45)
PH BLD: 7.46 (ref 7.35–7.45)
PH BLD: 7.47 (ref 7.35–7.45)
PH BLD: 7.48 (ref 7.35–7.45)
PH BLD: 7.49 (ref 7.35–7.45)
PH BLD: 7.5 (ref 7.35–7.45)
PH BLD: 7.51 (ref 7.35–7.45)
PH BLD: 7.52 (ref 7.35–7.45)
PH BLD: 7.53 (ref 7.35–7.45)
PH BLD: 7.54 (ref 7.35–7.45)
PH BLD: 7.55 (ref 7.35–7.45)
PH BLD: 7.56 (ref 7.35–7.45)
PH BLD: 7.57 (ref 7.35–7.45)
PH BLD: 7.58 (ref 7.35–7.45)
PH BLD: 7.58 (ref 7.35–7.45)
PH BLD: 7.59 (ref 7.35–7.45)
PH BLD: 7.6 (ref 7.35–7.45)
PH BLD: 7.61 (ref 7.35–7.45)
PH BLD: 7.61 (ref 7.35–7.45)
PH BLD: 7.62 (ref 7.35–7.45)
PH BLDC: 7.55 (ref 7.32–7.42)
PH BLDV: 7.29 (ref 7.32–7.42)
PH BLDV: 7.37 (ref 7.32–7.42)
PH BLDV: 7.38 (ref 7.32–7.42)
PH BLDV: 7.4 (ref 7.32–7.42)
PH BLDV: 7.4 (ref 7.32–7.42)
PH BLDV: 7.41 (ref 7.32–7.42)
PH BLDV: 7.42 (ref 7.32–7.42)
PH BLDV: 7.43 (ref 7.32–7.42)
PH BLDV: 7.44 (ref 7.32–7.42)
PH BLDV: 7.45 (ref 7.32–7.42)
PH BLDV: 7.46 (ref 7.32–7.42)
PH BLDV: 7.47 (ref 7.32–7.42)
PH BLDV: 7.48 (ref 7.32–7.42)
PH BLDV: 7.48 (ref 7.32–7.42)
PH BLDV: 7.49 (ref 7.32–7.42)
PH BLDV: 7.51 (ref 7.32–7.42)
PH BLDV: 7.53 (ref 7.32–7.42)
PH UR STRIP: 5 (ref 5–8)
PH UR STRIP: 5.5 (ref 5–8)
PHOSPHATE SERPL-MCNC: 1.8 MG/DL (ref 2.6–4.7)
PHOSPHATE SERPL-MCNC: 2.4 MG/DL (ref 2.6–4.7)
PHOSPHATE SERPL-MCNC: 2.6 MG/DL (ref 2.6–4.7)
PHOSPHATE SERPL-MCNC: 2.8 MG/DL (ref 2.6–4.7)
PHOSPHATE SERPL-MCNC: 2.9 MG/DL (ref 2.6–4.7)
PHOSPHATE SERPL-MCNC: 2.9 MG/DL (ref 2.6–4.7)
PHOSPHATE SERPL-MCNC: 3.1 MG/DL (ref 2.6–4.7)
PHOSPHATE SERPL-MCNC: 3.1 MG/DL (ref 2.6–4.7)
PHOSPHATE SERPL-MCNC: 3.3 MG/DL (ref 2.6–4.7)
PHOSPHATE SERPL-MCNC: 3.9 MG/DL (ref 2.6–4.7)
PHOSPHATE SERPL-MCNC: 4.6 MG/DL (ref 2.6–4.7)
PLATELET # BLD AUTO: 100 K/UL (ref 150–400)
PLATELET # BLD AUTO: 102 K/UL (ref 150–400)
PLATELET # BLD AUTO: 104 K/UL (ref 150–400)
PLATELET # BLD AUTO: 106 K/UL (ref 150–400)
PLATELET # BLD AUTO: 107 K/UL (ref 150–400)
PLATELET # BLD AUTO: 110 K/UL (ref 150–400)
PLATELET # BLD AUTO: 133 K/UL (ref 150–400)
PLATELET # BLD AUTO: 136 K/UL (ref 150–400)
PLATELET # BLD AUTO: 143 K/UL (ref 150–400)
PLATELET # BLD AUTO: 144 K/UL (ref 150–400)
PLATELET # BLD AUTO: 157 K/UL (ref 150–400)
PLATELET # BLD AUTO: 174 K/UL (ref 150–400)
PLATELET # BLD AUTO: 26 K/UL (ref 150–400)
PLATELET # BLD AUTO: 34 K/UL (ref 150–400)
PLATELET # BLD AUTO: 45 K/UL (ref 150–400)
PLATELET # BLD AUTO: 48 K/UL (ref 150–400)
PLATELET # BLD AUTO: 51 K/UL (ref 150–400)
PLATELET # BLD AUTO: 54 K/UL (ref 150–400)
PLATELET # BLD AUTO: 56 K/UL (ref 150–400)
PLATELET # BLD AUTO: 61 K/UL (ref 150–400)
PLATELET # BLD AUTO: 68 K/UL (ref 150–400)
PLATELET # BLD AUTO: 76 K/UL (ref 150–400)
PLATELET # BLD AUTO: 77 K/UL (ref 150–400)
PLATELET # BLD AUTO: 81 K/UL (ref 150–400)
PLATELET # BLD AUTO: 84 K/UL (ref 150–400)
PLATELET # BLD AUTO: 85 K/UL (ref 150–400)
PLATELET # BLD AUTO: 87 K/UL (ref 150–400)
PLATELET # BLD AUTO: 95 K/UL (ref 150–400)
PLATELET # BLD AUTO: 97 K/UL (ref 150–400)
PLATELET COMMENT: ABNORMAL
PLATELET COMMENT: ABNORMAL
PMV BLD AUTO: 10.6 FL (ref 8.9–12.9)
PMV BLD AUTO: 11.2 FL (ref 8.9–12.9)
PMV BLD AUTO: 11.2 FL (ref 8.9–12.9)
PMV BLD AUTO: 11.3 FL (ref 8.9–12.9)
PMV BLD AUTO: 11.6 FL (ref 8.9–12.9)
PMV BLD AUTO: 11.8 FL (ref 8.9–12.9)
PMV BLD AUTO: 11.9 FL (ref 8.9–12.9)
PMV BLD AUTO: 12 FL (ref 8.9–12.9)
PMV BLD AUTO: 12.1 FL (ref 8.9–12.9)
PMV BLD AUTO: 12.2 FL (ref 8.9–12.9)
PMV BLD AUTO: 12.3 FL (ref 8.9–12.9)
PMV BLD AUTO: 12.5 FL (ref 8.9–12.9)
PMV BLD AUTO: 12.5 FL (ref 8.9–12.9)
PMV BLD AUTO: 12.6 FL (ref 8.9–12.9)
PMV BLD AUTO: 12.6 FL (ref 8.9–12.9)
PMV BLD AUTO: 12.8 FL (ref 8.9–12.9)
PMV BLD AUTO: 12.9 FL (ref 8.9–12.9)
PMV BLD AUTO: 12.9 FL (ref 8.9–12.9)
PMV BLD AUTO: 9.3 FL (ref 8.9–12.9)
PMV BLD AUTO: 9.3 FL (ref 8.9–12.9)
PO2 BLD: 100 MMHG (ref 80–100)
PO2 BLD: 101 MMHG (ref 80–100)
PO2 BLD: 103 MMHG (ref 80–100)
PO2 BLD: 104 MMHG (ref 80–100)
PO2 BLD: 105 MMHG (ref 80–100)
PO2 BLD: 110 MMHG (ref 80–100)
PO2 BLD: 110 MMHG (ref 80–100)
PO2 BLD: 140 MMHG (ref 80–100)
PO2 BLD: 175 MMHG (ref 80–100)
PO2 BLD: 250 MMHG (ref 80–100)
PO2 BLD: 256 MMHG (ref 80–100)
PO2 BLD: 263 MMHG (ref 80–100)
PO2 BLD: 266 MMHG (ref 80–100)
PO2 BLD: 269 MMHG (ref 80–100)
PO2 BLD: 279 MMHG (ref 80–100)
PO2 BLD: 285 MMHG (ref 80–100)
PO2 BLD: 286 MMHG (ref 80–100)
PO2 BLD: 289 MMHG (ref 80–100)
PO2 BLD: 292 MMHG (ref 80–100)
PO2 BLD: 30 MMHG (ref 80–100)
PO2 BLD: 351 MMHG (ref 80–100)
PO2 BLD: 353 MMHG (ref 80–100)
PO2 BLD: 356 MMHG (ref 80–100)
PO2 BLD: 362 MMHG (ref 80–100)
PO2 BLD: 364 MMHG (ref 80–100)
PO2 BLD: 370 MMHG (ref 80–100)
PO2 BLD: 371 MMHG (ref 80–100)
PO2 BLD: 372 MMHG (ref 80–100)
PO2 BLD: 378 MMHG (ref 80–100)
PO2 BLD: 389 MMHG (ref 80–100)
PO2 BLD: 39 MMHG (ref 80–100)
PO2 BLD: 403 MMHG (ref 80–100)
PO2 BLD: 41 MMHG (ref 80–100)
PO2 BLD: 414 MMHG (ref 80–100)
PO2 BLD: 424 MMHG (ref 80–100)
PO2 BLD: 44 MMHG (ref 80–100)
PO2 BLD: 442 MMHG (ref 80–100)
PO2 BLD: 45 MMHG (ref 80–100)
PO2 BLD: 45 MMHG (ref 80–100)
PO2 BLD: 456 MMHG (ref 80–100)
PO2 BLD: 46 MMHG (ref 80–100)
PO2 BLD: 47 MMHG (ref 80–100)
PO2 BLD: 47 MMHG (ref 80–100)
PO2 BLD: 48 MMHG (ref 80–100)
PO2 BLD: 49 MMHG (ref 80–100)
PO2 BLD: 50 MMHG (ref 80–100)
PO2 BLD: 51 MMHG (ref 80–100)
PO2 BLD: 51 MMHG (ref 80–100)
PO2 BLD: 52 MMHG (ref 80–100)
PO2 BLD: 52 MMHG (ref 80–100)
PO2 BLD: 53 MMHG (ref 80–100)
PO2 BLD: 53 MMHG (ref 80–100)
PO2 BLD: 55 MMHG (ref 80–100)
PO2 BLD: 56 MMHG (ref 80–100)
PO2 BLD: 56 MMHG (ref 80–100)
PO2 BLD: 57 MMHG (ref 80–100)
PO2 BLD: 58 MMHG (ref 80–100)
PO2 BLD: 59 MMHG (ref 80–100)
PO2 BLD: 60 MMHG (ref 80–100)
PO2 BLD: 61 MMHG (ref 80–100)
PO2 BLD: 62 MMHG (ref 80–100)
PO2 BLD: 63 MMHG (ref 80–100)
PO2 BLD: 64 MMHG (ref 80–100)
PO2 BLD: 65 MMHG (ref 80–100)
PO2 BLD: 66 MMHG (ref 80–100)
PO2 BLD: 67 MMHG (ref 80–100)
PO2 BLD: 68 MMHG (ref 80–100)
PO2 BLD: 70 MMHG (ref 80–100)
PO2 BLD: 71 MMHG (ref 80–100)
PO2 BLD: 72 MMHG (ref 80–100)
PO2 BLD: 73 MMHG (ref 80–100)
PO2 BLD: 73 MMHG (ref 80–100)
PO2 BLD: 74 MMHG (ref 80–100)
PO2 BLD: 75 MMHG (ref 80–100)
PO2 BLD: 76 MMHG (ref 80–100)
PO2 BLD: 77 MMHG (ref 80–100)
PO2 BLD: 77 MMHG (ref 80–100)
PO2 BLD: 78 MMHG (ref 80–100)
PO2 BLD: 79 MMHG (ref 80–100)
PO2 BLD: 80 MMHG (ref 80–100)
PO2 BLD: 81 MMHG (ref 80–100)
PO2 BLD: 81 MMHG (ref 80–100)
PO2 BLD: 82 MMHG (ref 80–100)
PO2 BLD: 84 MMHG (ref 80–100)
PO2 BLD: 86 MMHG (ref 80–100)
PO2 BLD: 86 MMHG (ref 80–100)
PO2 BLD: 87 MMHG (ref 80–100)
PO2 BLD: 88 MMHG (ref 80–100)
PO2 BLD: 88 MMHG (ref 80–100)
PO2 BLD: 90 MMHG (ref 80–100)
PO2 BLD: 90 MMHG (ref 80–100)
PO2 BLD: 91 MMHG (ref 80–100)
PO2 BLD: 92 MMHG (ref 80–100)
PO2 BLD: 93 MMHG (ref 80–100)
PO2 BLD: 94 MMHG (ref 80–100)
PO2 BLD: 95 MMHG (ref 80–100)
PO2 BLD: 95 MMHG (ref 80–100)
PO2 BLD: 97 MMHG (ref 80–100)
PO2 BLD: >515 MMHG (ref 80–100)
PO2 BLDC: 31 MMHG (ref 40–50)
PO2 BLDV: 233 MMHG (ref 25–40)
PO2 BLDV: 26 MMHG (ref 25–40)
PO2 BLDV: 28 MMHG (ref 25–40)
PO2 BLDV: 28 MMHG (ref 25–40)
PO2 BLDV: 283 MMHG (ref 25–40)
PO2 BLDV: 287 MMHG (ref 25–40)
PO2 BLDV: 29 MMHG (ref 25–40)
PO2 BLDV: 29 MMHG (ref 25–40)
PO2 BLDV: 30 MMHG (ref 25–40)
PO2 BLDV: 302 MMHG (ref 25–40)
PO2 BLDV: 31 MMHG (ref 25–40)
PO2 BLDV: 31 MMHG (ref 25–40)
PO2 BLDV: 314 MMHG (ref 25–40)
PO2 BLDV: 32 MMHG (ref 25–40)
PO2 BLDV: 32 MMHG (ref 25–40)
PO2 BLDV: 327 MMHG (ref 25–40)
PO2 BLDV: 33 MMHG (ref 25–40)
PO2 BLDV: 34 MMHG (ref 25–40)
PO2 BLDV: 34 MMHG (ref 25–40)
PO2 BLDV: 35 MMHG (ref 25–40)
PO2 BLDV: 35 MMHG (ref 25–40)
PO2 BLDV: 36 MMHG (ref 25–40)
PO2 BLDV: 37 MMHG (ref 25–40)
PO2 BLDV: 37 MMHG (ref 25–40)
PO2 BLDV: 40 MMHG (ref 25–40)
POTASSIUM BLD-SCNC: 3.4 MMOL/L (ref 3.5–5.5)
POTASSIUM BLD-SCNC: 3.4 MMOL/L (ref 3.5–5.5)
POTASSIUM BLD-SCNC: 3.6 MMOL/L (ref 3.5–5.5)
POTASSIUM BLD-SCNC: 3.6 MMOL/L (ref 3.5–5.5)
POTASSIUM BLD-SCNC: 3.9 MMOL/L (ref 3.5–5.5)
POTASSIUM BLD-SCNC: 4 MMOL/L (ref 3.5–5.5)
POTASSIUM BLD-SCNC: 4.2 MMOL/L (ref 3.5–5.5)
POTASSIUM SERPL-SCNC: 2.9 MMOL/L (ref 3.5–5.1)
POTASSIUM SERPL-SCNC: 3.2 MMOL/L (ref 3.5–5.1)
POTASSIUM SERPL-SCNC: 3.3 MMOL/L (ref 3.5–5.1)
POTASSIUM SERPL-SCNC: 3.3 MMOL/L (ref 3.5–5.1)
POTASSIUM SERPL-SCNC: 3.4 MMOL/L (ref 3.5–5.1)
POTASSIUM SERPL-SCNC: 3.5 MMOL/L (ref 3.5–5.1)
POTASSIUM SERPL-SCNC: 3.6 MMOL/L (ref 3.5–5.1)
POTASSIUM SERPL-SCNC: 3.7 MMOL/L (ref 3.5–5.1)
POTASSIUM SERPL-SCNC: 3.8 MMOL/L (ref 3.5–5.1)
POTASSIUM SERPL-SCNC: 3.8 MMOL/L (ref 3.5–5.1)
POTASSIUM SERPL-SCNC: 3.9 MMOL/L (ref 3.5–5.1)
POTASSIUM SERPL-SCNC: 4 MMOL/L (ref 3.5–5.1)
POTASSIUM SERPL-SCNC: 4.1 MMOL/L (ref 3.5–5.1)
POTASSIUM SERPL-SCNC: 4.2 MMOL/L (ref 3.5–5.1)
POTASSIUM SERPL-SCNC: 4.2 MMOL/L (ref 3.5–5.1)
POTASSIUM SERPL-SCNC: 4.3 MMOL/L (ref 3.5–5.1)
POTASSIUM SERPL-SCNC: 4.4 MMOL/L (ref 3.5–5.1)
POTASSIUM SERPL-SCNC: 4.4 MMOL/L (ref 3.5–5.1)
POTASSIUM SERPL-SCNC: 4.5 MMOL/L (ref 3.5–5.1)
POTASSIUM SERPL-SCNC: 4.6 MMOL/L (ref 3.5–5.1)
POTASSIUM SERPL-SCNC: 4.6 MMOL/L (ref 3.5–5.1)
POTASSIUM SERPL-SCNC: 4.8 MMOL/L (ref 3.5–5.1)
POTASSIUM SERPL-SCNC: 5.2 MMOL/L (ref 3.5–5.1)
POTASSIUM SERPL-SCNC: 5.3 MMOL/L (ref 3.5–5.1)
POTASSIUM SERPL-SCNC: 5.5 MMOL/L (ref 3.5–5.1)
PRESSURE SUPPORT SETTING VENT: 6
PROCALCITONIN SERPL-MCNC: 11.64 NG/ML
PROCALCITONIN SERPL-MCNC: 13.67 NG/ML
PROCALCITONIN SERPL-MCNC: 27.91 NG/ML
PROCALCITONIN SERPL-MCNC: 3.87 NG/ML
PROCALCITONIN SERPL-MCNC: 3.92 NG/ML
PROCALCITONIN SERPL-MCNC: 4.38 NG/ML
PROCALCITONIN SERPL-MCNC: 4.4 NG/ML
PROCALCITONIN SERPL-MCNC: 40.51 NG/ML
PROCALCITONIN SERPL-MCNC: 5.85 NG/ML
PROCALCITONIN SERPL-MCNC: 50.07 NG/ML
PROCALCITONIN SERPL-MCNC: 56.08 NG/ML
PROCALCITONIN SERPL-MCNC: 6.69 NG/ML
PROCALCITONIN SERPL-MCNC: 7.85 NG/ML
PROT SERPL-MCNC: 4 G/DL (ref 6.4–8.2)
PROT SERPL-MCNC: 4.3 G/DL (ref 6.4–8.2)
PROT SERPL-MCNC: 4.5 G/DL (ref 6.4–8.2)
PROT SERPL-MCNC: 4.5 G/DL (ref 6.4–8.2)
PROT SERPL-MCNC: 4.8 G/DL (ref 6.4–8.2)
PROT SERPL-MCNC: 4.9 G/DL (ref 6.4–8.2)
PROT SERPL-MCNC: 4.9 G/DL (ref 6.4–8.2)
PROT SERPL-MCNC: 5 G/DL (ref 6.4–8.2)
PROT SERPL-MCNC: 5.1 G/DL (ref 6.4–8.2)
PROT SERPL-MCNC: 5.3 G/DL (ref 6.4–8.2)
PROT SERPL-MCNC: 5.4 G/DL (ref 6.4–8.2)
PROT SERPL-MCNC: 5.5 G/DL (ref 6.4–8.2)
PROT SERPL-MCNC: 5.5 G/DL (ref 6.4–8.2)
PROT SERPL-MCNC: 5.6 G/DL (ref 6.4–8.2)
PROT SERPL-MCNC: 5.7 G/DL (ref 6.4–8.2)
PROT SERPL-MCNC: 5.7 G/DL (ref 6.4–8.2)
PROT SERPL-MCNC: 5.8 G/DL (ref 6.4–8.2)
PROT SERPL-MCNC: 5.8 G/DL (ref 6.4–8.2)
PROT SERPL-MCNC: 5.9 G/DL (ref 6.4–8.2)
PROT SERPL-MCNC: 5.9 G/DL (ref 6.4–8.2)
PROT SERPL-MCNC: 6 G/DL (ref 6.4–8.2)
PROT SERPL-MCNC: 6 G/DL (ref 6.4–8.2)
PROT SERPL-MCNC: 6.1 G/DL (ref 6.4–8.2)
PROT SERPL-MCNC: 7.6 G/DL (ref 6.4–8.2)
PROT UR STRIP-MCNC: 100 MG/DL
PROT UR STRIP-MCNC: NEGATIVE MG/DL
PROTHROMBIN TIME: 11.2 SEC (ref 9–11.1)
PROTHROMBIN TIME: 15.3 SEC (ref 9–11.1)
PROTHROMBIN TIME: 16.7 SEC (ref 9–11.1)
RBC # BLD AUTO: 2.15 M/UL (ref 4.1–5.7)
RBC # BLD AUTO: 2.32 M/UL (ref 4.1–5.7)
RBC # BLD AUTO: 2.35 M/UL (ref 4.1–5.7)
RBC # BLD AUTO: 2.43 M/UL (ref 4.1–5.7)
RBC # BLD AUTO: 2.44 M/UL (ref 4.1–5.7)
RBC # BLD AUTO: 2.44 M/UL (ref 4.1–5.7)
RBC # BLD AUTO: 2.5 M/UL (ref 4.1–5.7)
RBC # BLD AUTO: 2.51 M/UL (ref 4.1–5.7)
RBC # BLD AUTO: 2.53 M/UL (ref 4.1–5.7)
RBC # BLD AUTO: 2.55 M/UL (ref 4.1–5.7)
RBC # BLD AUTO: 2.55 M/UL (ref 4.1–5.7)
RBC # BLD AUTO: 2.6 M/UL (ref 4.1–5.7)
RBC # BLD AUTO: 2.6 M/UL (ref 4.1–5.7)
RBC # BLD AUTO: 2.66 M/UL (ref 4.1–5.7)
RBC # BLD AUTO: 2.78 M/UL (ref 4.1–5.7)
RBC # BLD AUTO: 2.84 M/UL (ref 4.1–5.7)
RBC # BLD AUTO: 2.88 M/UL (ref 4.1–5.7)
RBC # BLD AUTO: 2.88 M/UL (ref 4.1–5.7)
RBC # BLD AUTO: 2.89 M/UL (ref 4.1–5.7)
RBC # BLD AUTO: 2.95 M/UL (ref 4.1–5.7)
RBC # BLD AUTO: 2.96 M/UL (ref 4.1–5.7)
RBC # BLD AUTO: 2.97 M/UL (ref 4.1–5.7)
RBC # BLD AUTO: 2.98 M/UL (ref 4.1–5.7)
RBC # BLD AUTO: 3 M/UL (ref 4.1–5.7)
RBC # BLD AUTO: 3.3 M/UL (ref 4.1–5.7)
RBC # BLD AUTO: 3.63 M/UL (ref 4.1–5.7)
RBC # BLD AUTO: 3.87 M/UL (ref 4.1–5.7)
RBC # BLD AUTO: 4.86 M/UL (ref 4.1–5.7)
RBC # BLD AUTO: 5.45 M/UL (ref 4.1–5.7)
RBC #/AREA URNS HPF: ABNORMAL /HPF (ref 0–5)
RBC #/AREA URNS HPF: ABNORMAL /HPF (ref 0–5)
RBC MORPH BLD: ABNORMAL
REFERENCE VALUE: ABNORMAL
REFERENCE VALUE: ABNORMAL
RESULT: POSITIVE
RESULT: POSITIVE
RETICS # AUTO: 0.06 M/UL (ref 0.03–0.1)
RETICS/RBC NFR AUTO: 1.7 % (ref 0.7–2.1)
SAO2 % BLD: 100 % (ref 92–97)
SAO2 % BLD: 39 % (ref 95–99)
SAO2 % BLD: 45 % (ref 95–99)
SAO2 % BLD: 50 % (ref 95–99)
SAO2 % BLD: 63 % (ref 95–99)
SAO2 % BLD: 67.1 % (ref 92–97)
SAO2 % BLD: 69.4 % (ref 92–97)
SAO2 % BLD: 76.9 % (ref 92–97)
SAO2 % BLD: 79.4 % (ref 92–97)
SAO2 % BLD: 81.7 % (ref 92–97)
SAO2 % BLD: 84.5 % (ref 92–97)
SAO2 % BLD: 85.1 % (ref 92–97)
SAO2 % BLD: 85.5 % (ref 92–97)
SAO2 % BLD: 85.8 % (ref 92–97)
SAO2 % BLD: 86.6 % (ref 92–97)
SAO2 % BLD: 87.5 % (ref 92–97)
SAO2 % BLD: 87.6 % (ref 92–97)
SAO2 % BLD: 87.8 % (ref 92–97)
SAO2 % BLD: 87.9 % (ref 92–97)
SAO2 % BLD: 88.7 % (ref 92–97)
SAO2 % BLD: 89.1 % (ref 92–97)
SAO2 % BLD: 89.2 % (ref 92–97)
SAO2 % BLD: 89.2 % (ref 92–97)
SAO2 % BLD: 89.3 % (ref 92–97)
SAO2 % BLD: 89.5 % (ref 92–97)
SAO2 % BLD: 89.7 % (ref 92–97)
SAO2 % BLD: 89.8 % (ref 92–97)
SAO2 % BLD: 90 %
SAO2 % BLD: 90 % (ref 92–97)
SAO2 % BLD: 90.1 % (ref 92–97)
SAO2 % BLD: 90.7 % (ref 92–97)
SAO2 % BLD: 90.7 % (ref 92–97)
SAO2 % BLD: 90.8 % (ref 92–97)
SAO2 % BLD: 90.9 % (ref 92–97)
SAO2 % BLD: 91.3 % (ref 92–97)
SAO2 % BLD: 91.4 % (ref 92–97)
SAO2 % BLD: 91.4 % (ref 92–97)
SAO2 % BLD: 91.5 % (ref 92–97)
SAO2 % BLD: 91.6 % (ref 92–97)
SAO2 % BLD: 91.8 % (ref 92–97)
SAO2 % BLD: 91.9 % (ref 92–97)
SAO2 % BLD: 92 %
SAO2 % BLD: 92 % (ref 92–97)
SAO2 % BLD: 92.1 % (ref 92–97)
SAO2 % BLD: 92.1 % (ref 92–97)
SAO2 % BLD: 92.2 % (ref 92–97)
SAO2 % BLD: 92.3 % (ref 92–97)
SAO2 % BLD: 92.3 % (ref 92–97)
SAO2 % BLD: 92.4 % (ref 92–97)
SAO2 % BLD: 92.4 % (ref 92–97)
SAO2 % BLD: 92.5 % (ref 92–97)
SAO2 % BLD: 92.6 % (ref 92–97)
SAO2 % BLD: 92.6 % (ref 92–97)
SAO2 % BLD: 92.8 % (ref 92–97)
SAO2 % BLD: 93 %
SAO2 % BLD: 93 % (ref 92–97)
SAO2 % BLD: 93 % (ref 92–97)
SAO2 % BLD: 93.2 % (ref 92–97)
SAO2 % BLD: 93.2 % (ref 92–97)
SAO2 % BLD: 93.3 % (ref 92–97)
SAO2 % BLD: 93.4 % (ref 92–97)
SAO2 % BLD: 93.5 % (ref 92–97)
SAO2 % BLD: 93.5 % (ref 92–97)
SAO2 % BLD: 93.6 % (ref 92–97)
SAO2 % BLD: 93.6 % (ref 92–97)
SAO2 % BLD: 93.8 % (ref 92–97)
SAO2 % BLD: 93.8 % (ref 92–97)
SAO2 % BLD: 93.9 % (ref 92–97)
SAO2 % BLD: 94.1 % (ref 92–97)
SAO2 % BLD: 94.2 % (ref 92–97)
SAO2 % BLD: 94.3 % (ref 92–97)
SAO2 % BLD: 94.4 % (ref 92–97)
SAO2 % BLD: 94.4 % (ref 92–97)
SAO2 % BLD: 94.6 % (ref 92–97)
SAO2 % BLD: 94.8 % (ref 92–97)
SAO2 % BLD: 94.8 % (ref 92–97)
SAO2 % BLD: 94.9 % (ref 92–97)
SAO2 % BLD: 94.9 % (ref 92–97)
SAO2 % BLD: 95 % (ref 92–97)
SAO2 % BLD: 95 % (ref 92–97)
SAO2 % BLD: 95.1 % (ref 92–97)
SAO2 % BLD: 95.1 % (ref 92–97)
SAO2 % BLD: 95.2 % (ref 92–97)
SAO2 % BLD: 95.3 % (ref 92–97)
SAO2 % BLD: 95.4 % (ref 92–97)
SAO2 % BLD: 95.5 % (ref 92–97)
SAO2 % BLD: 95.6 % (ref 92–97)
SAO2 % BLD: 95.7 % (ref 92–97)
SAO2 % BLD: 95.8 % (ref 92–97)
SAO2 % BLD: 95.9 % (ref 92–97)
SAO2 % BLD: 96 %
SAO2 % BLD: 96 %
SAO2 % BLD: 96 % (ref 92–97)
SAO2 % BLD: 96.1 % (ref 92–97)
SAO2 % BLD: 96.2 % (ref 92–97)
SAO2 % BLD: 96.3 % (ref 92–97)
SAO2 % BLD: 96.3 % (ref 92–97)
SAO2 % BLD: 96.5 % (ref 92–97)
SAO2 % BLD: 96.6 % (ref 92–97)
SAO2 % BLD: 96.8 % (ref 92–97)
SAO2 % BLD: 96.8 % (ref 92–97)
SAO2 % BLD: 97 %
SAO2 % BLD: 97 % (ref 92–97)
SAO2 % BLD: 97.1 % (ref 92–97)
SAO2 % BLD: 97.2 % (ref 92–97)
SAO2 % BLD: 97.3 % (ref 92–97)
SAO2 % BLD: 97.4 % (ref 92–97)
SAO2 % BLD: 97.5 % (ref 92–97)
SAO2 % BLD: 97.6 % (ref 92–97)
SAO2 % BLD: 97.6 % (ref 92–97)
SAO2 % BLD: 97.7 % (ref 92–97)
SAO2 % BLD: 97.7 % (ref 92–97)
SAO2 % BLD: 97.8 % (ref 92–97)
SAO2 % BLD: 97.9 % (ref 92–97)
SAO2 % BLD: 98 %
SAO2 % BLD: 98 % (ref 92–97)
SAO2 % BLD: 98 % (ref 92–97)
SAO2 % BLD: 98.1 % (ref 92–97)
SAO2 % BLD: 98.2 % (ref 92–97)
SAO2 % BLD: 98.4 % (ref 92–97)
SAO2 % BLD: 98.4 % (ref 92–97)
SAO2 % BLD: 98.6 % (ref 92–97)
SAO2 % BLD: 98.7 % (ref 92–97)
SAO2 % BLD: 98.7 % (ref 92–97)
SAO2 % BLD: 99.4 % (ref 92–97)
SAO2 % BLD: 99.7 % (ref 92–97)
SAO2 % BLD: 99.9 % (ref 92–97)
SAO2 % BLDV: 100 % (ref 65–88)
SAO2 % BLDV: 50.4 % (ref 65–88)
SAO2 % BLDV: 56.2 % (ref 65–88)
SAO2 % BLDV: 56.7 % (ref 65–88)
SAO2 % BLDV: 58 % (ref 65–88)
SAO2 % BLDV: 58.1 % (ref 65–88)
SAO2 % BLDV: 59 % (ref 65–88)
SAO2 % BLDV: 60 % (ref 65–88)
SAO2 % BLDV: 60 % (ref 65–88)
SAO2 % BLDV: 61.3 % (ref 65–88)
SAO2 % BLDV: 62.3 % (ref 65–88)
SAO2 % BLDV: 63.7 % (ref 65–88)
SAO2 % BLDV: 65 % (ref 65–88)
SAO2 % BLDV: 66.3 % (ref 65–88)
SAO2 % BLDV: 66.8 % (ref 65–88)
SAO2 % BLDV: 67.3 % (ref 65–88)
SAO2 % BLDV: 68.5 % (ref 65–88)
SAO2 % BLDV: 69.8 % (ref 65–88)
SAO2 % BLDV: 71.1 % (ref 65–88)
SAO2 % BLDV: 72.1 % (ref 65–88)
SAO2 % BLDV: 74 % (ref 65–88)
SAO2 % BLDV: 74.8 % (ref 65–88)
SAO2 % BLDV: 99.8 % (ref 65–88)
SAO2 % BLDV: 99.9 % (ref 65–88)
SAO2% DEVICE SAO2% SENSOR NAME: ABNORMAL
SAO2% DEVICE SAO2% SENSOR NAME: NORMAL
SAO2% DEVICE SAO2% SENSOR NAME: NORMAL
SERVICE CMNT-IMP: ABNORMAL
SERVICE CMNT-IMP: NORMAL
SODIUM SERPL-SCNC: 135 MMOL/L (ref 136–145)
SODIUM SERPL-SCNC: 139 MMOL/L (ref 136–145)
SODIUM SERPL-SCNC: 140 MMOL/L (ref 136–145)
SODIUM SERPL-SCNC: 141 MMOL/L (ref 136–145)
SODIUM SERPL-SCNC: 142 MMOL/L (ref 136–145)
SODIUM SERPL-SCNC: 142 MMOL/L (ref 136–145)
SODIUM SERPL-SCNC: 143 MMOL/L (ref 136–145)
SODIUM SERPL-SCNC: 144 MMOL/L (ref 136–145)
SODIUM SERPL-SCNC: 145 MMOL/L (ref 136–145)
SODIUM SERPL-SCNC: 146 MMOL/L (ref 136–145)
SODIUM SERPL-SCNC: 146 MMOL/L (ref 136–145)
SODIUM SERPL-SCNC: 147 MMOL/L (ref 136–145)
SODIUM SERPL-SCNC: 148 MMOL/L (ref 136–145)
SODIUM SERPL-SCNC: 149 MMOL/L (ref 136–145)
SODIUM SERPL-SCNC: 150 MMOL/L (ref 136–145)
SODIUM SERPL-SCNC: 151 MMOL/L (ref 136–145)
SODIUM SERPL-SCNC: 152 MMOL/L (ref 136–145)
SODIUM SERPL-SCNC: 153 MMOL/L (ref 136–145)
SODIUM SERPL-SCNC: 154 MMOL/L (ref 136–145)
SODIUM SERPL-SCNC: 155 MMOL/L (ref 136–145)
SODIUM SERPL-SCNC: 157 MMOL/L (ref 136–145)
SODIUM SERPL-SCNC: 157 MMOL/L (ref 136–145)
SODIUM SERPL-SCNC: 158 MMOL/L (ref 136–145)
SODIUM SERPL-SCNC: 158 MMOL/L (ref 136–145)
SP GR UR REFRACTOMETRY: 1.01 (ref 1–1.03)
SP GR UR REFRACTOMETRY: 1.03 (ref 1–1.03)
SPECIMEN EXP DATE BLD: NORMAL
SPECIMEN HOLD: NORMAL
SPECIMEN SITE: ABNORMAL
SPECIMEN SITE: NORMAL
SPECIMEN TYPE: ABNORMAL
SPECIMEN TYPE: NORMAL
T3FREE SERPL-MCNC: 1.1 PG/ML (ref 2.2–4)
T4 FREE SERPL-MCNC: 0.8 NG/DL (ref 0.8–1.5)
THERAPEUTIC RANGE: ABNORMAL SECS (ref 58–77)
THERAPEUTIC RANGE: NORMAL SECS (ref 58–77)
TRIGL SERPL-MCNC: 314 MG/DL
TRIGL SERPL-MCNC: 421 MG/DL
TSH SERPL DL<=0.05 MIU/L-ACNC: 0.46 UIU/ML (ref 0.45–4.5)
TSH SERPL DL<=0.05 MIU/L-ACNC: 0.74 UIU/ML (ref 0.36–3.74)
UFH PPP CHRO-ACNC: <0.1 IU/ML
UNIT DIVISION: 0
URINE CULTURE IF INDICATED: ABNORMAL
URINE CULTURE IF INDICATED: ABNORMAL
UROBILINOGEN UR QL STRIP.AUTO: 0.2 EU/DL (ref 0.2–1)
UROBILINOGEN UR QL STRIP.AUTO: 1 EU/DL (ref 0.2–1)
VANCOMYCIN SERPL-MCNC: 13.4 UG/ML
VANCOMYCIN SERPL-MCNC: 13.7 UG/ML
VANCOMYCIN SERPL-MCNC: 14.8 UG/ML
VANCOMYCIN SERPL-MCNC: 16.3 UG/ML
VANCOMYCIN SERPL-MCNC: 16.6 UG/ML
VANCOMYCIN SERPL-MCNC: 18.2 UG/ML
VANCOMYCIN SERPL-MCNC: 18.9 UG/ML
VANCOMYCIN SERPL-MCNC: 19.4 UG/ML
VANCOMYCIN SERPL-MCNC: 20 UG/ML
VANCOMYCIN SERPL-MCNC: 25.8 UG/ML
VANCOMYCIN TROUGH SERPL-MCNC: 22.8 UG/ML (ref 5–10)
VAS LEFT ABI: 0.67
VAS LEFT ATA DIST PSV: 45.3 CM/S
VAS LEFT CFA PROX PSV: 62.9 CM/S
VAS LEFT DORSALIS PEDIS BP: 84 MMHG
VAS LEFT PFA PROX PSV: 70.6 CM/S
VAS LEFT POP A DIST PSV: 32.5 CM/S
VAS LEFT POP A PROX PSV: 36 CM/S
VAS LEFT POP A PROX VEL RATIO: 0.97
VAS LEFT PTA BP: 81 MMHG
VAS LEFT PTA DIST PSV: 36.7 CM/S
VAS LEFT SFA DIST PSV: 37.2 CM/S
VAS LEFT SFA DIST VEL RATIO: 0.75
VAS LEFT SFA MID PSV: 49.7 CM/S
VAS LEFT SFA MID VEL RATIO: 0.92
VAS LEFT SFA PROX PSV: 54.1 CM/S
VAS LEFT SFA PROX VEL RATIO: 0.86
VAS RIGHT ABI: 0.96
VAS RIGHT ARM BP: 126 MMHG
VAS RIGHT DORSALIS PEDIS BP: 120 MMHG
VAS RIGHT PTA BP: 121 MMHG
VENTILATION MODE VENT: ABNORMAL
VLDLC SERPL CALC-MCNC: ABNORMAL MG/DL
VT SETTING VENT: 400
VT SETTING VENT: 520 ML
VWF AG ACT/NOR PPP IA: 297 % (ref 50–200)
VWF:RCO ACT/NOR PPP PL AGG: 203 % (ref 50–200)
WBC # BLD AUTO: 11.2 K/UL (ref 4.1–11.1)
WBC # BLD AUTO: 11.4 K/UL (ref 4.1–11.1)
WBC # BLD AUTO: 11.5 K/UL (ref 4.1–11.1)
WBC # BLD AUTO: 12.2 K/UL (ref 4.1–11.1)
WBC # BLD AUTO: 12.4 K/UL (ref 4.1–11.1)
WBC # BLD AUTO: 13.8 K/UL (ref 4.1–11.1)
WBC # BLD AUTO: 13.9 K/UL (ref 4.1–11.1)
WBC # BLD AUTO: 14.4 K/UL (ref 4.1–11.1)
WBC # BLD AUTO: 14.5 K/UL (ref 4.1–11.1)
WBC # BLD AUTO: 14.9 K/UL (ref 4.1–11.1)
WBC # BLD AUTO: 15.1 K/UL (ref 4.1–11.1)
WBC # BLD AUTO: 15.4 K/UL (ref 4.1–11.1)
WBC # BLD AUTO: 15.9 K/UL (ref 4.1–11.1)
WBC # BLD AUTO: 16 K/UL (ref 4.1–11.1)
WBC # BLD AUTO: 16.2 K/UL (ref 4.1–11.1)
WBC # BLD AUTO: 16.3 K/UL (ref 4.1–11.1)
WBC # BLD AUTO: 16.4 K/UL (ref 4.1–11.1)
WBC # BLD AUTO: 17 K/UL (ref 4.1–11.1)
WBC # BLD AUTO: 21.1 K/UL (ref 4.1–11.1)
WBC # BLD AUTO: 21.4 K/UL (ref 4.1–11.1)
WBC # BLD AUTO: 24.5 K/UL (ref 4.1–11.1)
WBC # BLD AUTO: 30.6 K/UL (ref 4.1–11.1)
WBC # BLD AUTO: 31.2 K/UL (ref 4.1–11.1)
WBC # BLD AUTO: 32.5 K/UL (ref 4.1–11.1)
WBC # BLD AUTO: 32.8 K/UL (ref 4.1–11.1)
WBC # BLD AUTO: 34.1 K/UL (ref 4.1–11.1)
WBC # BLD AUTO: 34.3 K/UL (ref 4.1–11.1)
WBC # BLD AUTO: 8.9 K/UL (ref 4.1–11.1)
WBC # BLD AUTO: 9.6 K/UL (ref 4.1–11.1)
WBC MORPH BLD: ABNORMAL
WBC MORPH BLD: ABNORMAL
WBC URNS QL MICRO: ABNORMAL /HPF (ref 0–4)
WBC URNS QL MICRO: ABNORMAL /HPF (ref 0–4)

## 2023-01-01 PROCEDURE — 2580000003 HC RX 258: Performed by: STUDENT IN AN ORGANIZED HEALTH CARE EDUCATION/TRAINING PROGRAM

## 2023-01-01 PROCEDURE — 6370000000 HC RX 637 (ALT 250 FOR IP)

## 2023-01-01 PROCEDURE — 80053 COMPREHEN METABOLIC PANEL: CPT

## 2023-01-01 PROCEDURE — 6360000002 HC RX W HCPCS: Performed by: NURSE PRACTITIONER

## 2023-01-01 PROCEDURE — 6360000002 HC RX W HCPCS

## 2023-01-01 PROCEDURE — 84481 FREE ASSAY (FT-3): CPT

## 2023-01-01 PROCEDURE — 94660 CPAP INITIATION&MGMT: CPT

## 2023-01-01 PROCEDURE — 6370000000 HC RX 637 (ALT 250 FOR IP): Performed by: INTERNAL MEDICINE

## 2023-01-01 PROCEDURE — 6370000000 HC RX 637 (ALT 250 FOR IP): Performed by: CLINICAL NURSE SPECIALIST

## 2023-01-01 PROCEDURE — 82947 ASSAY GLUCOSE BLOOD QUANT: CPT

## 2023-01-01 PROCEDURE — 84295 ASSAY OF SERUM SODIUM: CPT

## 2023-01-01 PROCEDURE — 83735 ASSAY OF MAGNESIUM: CPT

## 2023-01-01 PROCEDURE — 6360000002 HC RX W HCPCS: Performed by: THORACIC SURGERY (CARDIOTHORACIC VASCULAR SURGERY)

## 2023-01-01 PROCEDURE — 99255 IP/OBS CONSLTJ NEW/EST HI 80: CPT | Performed by: INTERNAL MEDICINE

## 2023-01-01 PROCEDURE — 6360000002 HC RX W HCPCS: Performed by: INTERNAL MEDICINE

## 2023-01-01 PROCEDURE — 74018 RADEX ABDOMEN 1 VIEW: CPT

## 2023-01-01 PROCEDURE — 71045 X-RAY EXAM CHEST 1 VIEW: CPT

## 2023-01-01 PROCEDURE — 6360000002 HC RX W HCPCS: Performed by: PHYSICIAN ASSISTANT

## 2023-01-01 PROCEDURE — 2000000000 HC ICU R&B

## 2023-01-01 PROCEDURE — 2580000003 HC RX 258: Performed by: NURSE PRACTITIONER

## 2023-01-01 PROCEDURE — 36430 TRANSFUSION BLD/BLD COMPNT: CPT

## 2023-01-01 PROCEDURE — 71250 CT THORAX DX C-: CPT

## 2023-01-01 PROCEDURE — 82962 GLUCOSE BLOOD TEST: CPT

## 2023-01-01 PROCEDURE — 74174 CTA ABD&PLVS W/CONTRAST: CPT

## 2023-01-01 PROCEDURE — 2580000003 HC RX 258

## 2023-01-01 PROCEDURE — 84132 ASSAY OF SERUM POTASSIUM: CPT

## 2023-01-01 PROCEDURE — 2500000003 HC RX 250 WO HCPCS

## 2023-01-01 PROCEDURE — 83721 ASSAY OF BLOOD LIPOPROTEIN: CPT

## 2023-01-01 PROCEDURE — 83615 LACTATE (LD) (LDH) ENZYME: CPT

## 2023-01-01 PROCEDURE — 02HV33Z INSERTION OF INFUSION DEVICE INTO SUPERIOR VENA CAVA, PERCUTANEOUS APPROACH: ICD-10-PCS | Performed by: THORACIC SURGERY (CARDIOTHORACIC VASCULAR SURGERY)

## 2023-01-01 PROCEDURE — 2580000003 HC RX 258: Performed by: THORACIC SURGERY (CARDIOTHORACIC VASCULAR SURGERY)

## 2023-01-01 PROCEDURE — 86900 BLOOD TYPING SEROLOGIC ABO: CPT

## 2023-01-01 PROCEDURE — 99233 SBSQ HOSP IP/OBS HIGH 50: CPT | Performed by: NURSE PRACTITIONER

## 2023-01-01 PROCEDURE — 37799 UNLISTED PX VASCULAR SURGERY: CPT

## 2023-01-01 PROCEDURE — 2500000003 HC RX 250 WO HCPCS: Performed by: ANESTHESIOLOGY

## 2023-01-01 PROCEDURE — 33947 ECMO/ECLS INITIATION ARTERY: CPT | Performed by: THORACIC SURGERY (CARDIOTHORACIC VASCULAR SURGERY)

## 2023-01-01 PROCEDURE — 34716 OPN AX/SUBCLA ART EXPOS CNDT: CPT

## 2023-01-01 PROCEDURE — 82330 ASSAY OF CALCIUM: CPT

## 2023-01-01 PROCEDURE — 2700000000 HC OXYGEN THERAPY PER DAY

## 2023-01-01 PROCEDURE — 86923 COMPATIBILITY TEST ELECTRIC: CPT

## 2023-01-01 PROCEDURE — 83605 ASSAY OF LACTIC ACID: CPT

## 2023-01-01 PROCEDURE — 2580000003 HC RX 258: Performed by: NURSE ANESTHETIST, CERTIFIED REGISTERED

## 2023-01-01 PROCEDURE — 82803 BLOOD GASES ANY COMBINATION: CPT

## 2023-01-01 PROCEDURE — 80202 ASSAY OF VANCOMYCIN: CPT

## 2023-01-01 PROCEDURE — 2580000003 HC RX 258: Performed by: ANESTHESIOLOGY

## 2023-01-01 PROCEDURE — P9045 ALBUMIN (HUMAN), 5%, 250 ML: HCPCS | Performed by: THORACIC SURGERY (CARDIOTHORACIC VASCULAR SURGERY)

## 2023-01-01 PROCEDURE — 36600 WITHDRAWAL OF ARTERIAL BLOOD: CPT

## 2023-01-01 PROCEDURE — 2580000003 HC RX 258: Performed by: INTERNAL MEDICINE

## 2023-01-01 PROCEDURE — 2500000003 HC RX 250 WO HCPCS: Performed by: INTERNAL MEDICINE

## 2023-01-01 PROCEDURE — 85014 HEMATOCRIT: CPT

## 2023-01-01 PROCEDURE — 85018 HEMOGLOBIN: CPT

## 2023-01-01 PROCEDURE — 85027 COMPLETE CBC AUTOMATED: CPT

## 2023-01-01 PROCEDURE — 85730 THROMBOPLASTIN TIME PARTIAL: CPT

## 2023-01-01 PROCEDURE — 87040 BLOOD CULTURE FOR BACTERIA: CPT

## 2023-01-01 PROCEDURE — 84443 ASSAY THYROID STIM HORMONE: CPT

## 2023-01-01 PROCEDURE — 94003 VENT MGMT INPAT SUBQ DAY: CPT

## 2023-01-01 PROCEDURE — 6360000002 HC RX W HCPCS: Performed by: STUDENT IN AN ORGANIZED HEALTH CARE EDUCATION/TRAINING PROGRAM

## 2023-01-01 PROCEDURE — 6370000000 HC RX 637 (ALT 250 FOR IP): Performed by: THORACIC SURGERY (CARDIOTHORACIC VASCULAR SURGERY)

## 2023-01-01 PROCEDURE — C9113 INJ PANTOPRAZOLE SODIUM, VIA: HCPCS

## 2023-01-01 PROCEDURE — 6370000000 HC RX 637 (ALT 250 FOR IP): Performed by: NURSE PRACTITIONER

## 2023-01-01 PROCEDURE — 36415 COLL VENOUS BLD VENIPUNCTURE: CPT

## 2023-01-01 PROCEDURE — 86850 RBC ANTIBODY SCREEN: CPT

## 2023-01-01 PROCEDURE — A4216 STERILE WATER/SALINE, 10 ML: HCPCS

## 2023-01-01 PROCEDURE — 99232 SBSQ HOSP IP/OBS MODERATE 35: CPT | Performed by: CLINICAL NURSE SPECIALIST

## 2023-01-01 PROCEDURE — 83050 HGB METHEMOGLOBIN QUAN: CPT

## 2023-01-01 PROCEDURE — 3700000000 HC ANESTHESIA ATTENDED CARE: Performed by: THORACIC SURGERY (CARDIOTHORACIC VASCULAR SURGERY)

## 2023-01-01 PROCEDURE — C1713 ANCHOR/SCREW BN/BN,TIS/BN: HCPCS | Performed by: THORACIC SURGERY (CARDIOTHORACIC VASCULAR SURGERY)

## 2023-01-01 PROCEDURE — 2500000003 HC RX 250 WO HCPCS: Performed by: THORACIC SURGERY (CARDIOTHORACIC VASCULAR SURGERY)

## 2023-01-01 PROCEDURE — 34716 OPN AX/SUBCLA ART EXPOS CNDT: CPT | Performed by: THORACIC SURGERY (CARDIOTHORACIC VASCULAR SURGERY)

## 2023-01-01 PROCEDURE — 6360000002 HC RX W HCPCS: Performed by: EMERGENCY MEDICINE

## 2023-01-01 PROCEDURE — 6360000002 HC RX W HCPCS: Performed by: ANESTHESIOLOGY

## 2023-01-01 PROCEDURE — 2500000003 HC RX 250 WO HCPCS: Performed by: STUDENT IN AN ORGANIZED HEALTH CARE EDUCATION/TRAINING PROGRAM

## 2023-01-01 PROCEDURE — 85610 PROTHROMBIN TIME: CPT

## 2023-01-01 PROCEDURE — 87070 CULTURE OTHR SPECIMN AEROBIC: CPT

## 2023-01-01 PROCEDURE — 85384 FIBRINOGEN ACTIVITY: CPT

## 2023-01-01 PROCEDURE — P9016 RBC LEUKOCYTES REDUCED: HCPCS

## 2023-01-01 PROCEDURE — P9073 PLATELETS PHERESIS PATH REDU: HCPCS

## 2023-01-01 PROCEDURE — 94640 AIRWAY INHALATION TREATMENT: CPT

## 2023-01-01 PROCEDURE — 85049 AUTOMATED PLATELET COUNT: CPT

## 2023-01-01 PROCEDURE — 2580000003 HC RX 258: Performed by: PHYSICIAN ASSISTANT

## 2023-01-01 PROCEDURE — A4216 STERILE WATER/SALINE, 10 ML: HCPCS | Performed by: STUDENT IN AN ORGANIZED HEALTH CARE EDUCATION/TRAINING PROGRAM

## 2023-01-01 PROCEDURE — 6370000000 HC RX 637 (ALT 250 FOR IP): Performed by: STUDENT IN AN ORGANIZED HEALTH CARE EDUCATION/TRAINING PROGRAM

## 2023-01-01 PROCEDURE — 3600000018 HC SURGERY OHS ADDTL 15MIN: Performed by: THORACIC SURGERY (CARDIOTHORACIC VASCULAR SURGERY)

## 2023-01-01 PROCEDURE — 88304 TISSUE EXAM BY PATHOLOGIST: CPT

## 2023-01-01 PROCEDURE — 80048 BASIC METABOLIC PNL TOTAL CA: CPT

## 2023-01-01 PROCEDURE — 0BH17EZ INSERTION OF ENDOTRACHEAL AIRWAY INTO TRACHEA, VIA NATURAL OR ARTIFICIAL OPENING: ICD-10-PCS | Performed by: THORACIC SURGERY (CARDIOTHORACIC VASCULAR SURGERY)

## 2023-01-01 PROCEDURE — 85025 COMPLETE CBC W/AUTO DIFF WBC: CPT

## 2023-01-01 PROCEDURE — 99231 SBSQ HOSP IP/OBS SF/LOW 25: CPT | Performed by: INTERNAL MEDICINE

## 2023-01-01 PROCEDURE — 6360000004 HC RX CONTRAST MEDICATION: Performed by: STUDENT IN AN ORGANIZED HEALTH CARE EDUCATION/TRAINING PROGRAM

## 2023-01-01 PROCEDURE — 82375 ASSAY CARBOXYHB QUANT: CPT

## 2023-01-01 PROCEDURE — 84145 PROCALCITONIN (PCT): CPT

## 2023-01-01 PROCEDURE — A4217 STERILE WATER/SALINE, 500 ML: HCPCS | Performed by: THORACIC SURGERY (CARDIOTHORACIC VASCULAR SURGERY)

## 2023-01-01 PROCEDURE — 2500000003 HC RX 250 WO HCPCS: Performed by: PHYSICIAN ASSISTANT

## 2023-01-01 PROCEDURE — 99223 1ST HOSP IP/OBS HIGH 75: CPT | Performed by: NURSE PRACTITIONER

## 2023-01-01 PROCEDURE — 93926 LOWER EXTREMITY STUDY: CPT

## 2023-01-01 PROCEDURE — 86901 BLOOD TYPING SEROLOGIC RH(D): CPT

## 2023-01-01 PROCEDURE — 33863 ASCENDING AORTIC GRAFT: CPT | Performed by: THORACIC SURGERY (CARDIOTHORACIC VASCULAR SURGERY)

## 2023-01-01 PROCEDURE — 84478 ASSAY OF TRIGLYCERIDES: CPT

## 2023-01-01 PROCEDURE — 33948 ECMO/ECLS DAILY MGMT-VENOUS: CPT

## 2023-01-01 PROCEDURE — 3600000008 HC SURGERY OHS BASE: Performed by: THORACIC SURGERY (CARDIOTHORACIC VASCULAR SURGERY)

## 2023-01-01 PROCEDURE — 2010000000 HC RM ICU SURGICAL

## 2023-01-01 PROCEDURE — C1781 MESH (IMPLANTABLE): HCPCS | Performed by: THORACIC SURGERY (CARDIOTHORACIC VASCULAR SURGERY)

## 2023-01-01 PROCEDURE — 36620 INSERTION CATHETER ARTERY: CPT

## 2023-01-01 PROCEDURE — 02RX08Z REPLACEMENT OF THORACIC AORTA, ASCENDING/ARCH WITH ZOOPLASTIC TISSUE, OPEN APPROACH: ICD-10-PCS | Performed by: THORACIC SURGERY (CARDIOTHORACIC VASCULAR SURGERY)

## 2023-01-01 PROCEDURE — P9045 ALBUMIN (HUMAN), 5%, 250 ML: HCPCS | Performed by: EMERGENCY MEDICINE

## 2023-01-01 PROCEDURE — 92610 EVALUATE SWALLOWING FUNCTION: CPT

## 2023-01-01 PROCEDURE — 0BJ08ZZ INSPECTION OF TRACHEOBRONCHIAL TREE, VIA NATURAL OR ARTIFICIAL OPENING ENDOSCOPIC: ICD-10-PCS | Performed by: STUDENT IN AN ORGANIZED HEALTH CARE EDUCATION/TRAINING PROGRAM

## 2023-01-01 PROCEDURE — P9045 ALBUMIN (HUMAN), 5%, 250 ML: HCPCS

## 2023-01-01 PROCEDURE — 85246 CLOT FACTOR VIII VW ANTIGEN: CPT

## 2023-01-01 PROCEDURE — 97110 THERAPEUTIC EXERCISES: CPT

## 2023-01-01 PROCEDURE — 80061 LIPID PANEL: CPT

## 2023-01-01 PROCEDURE — 95012 NITRIC OXIDE EXP GAS DETER: CPT

## 2023-01-01 PROCEDURE — 87077 CULTURE AEROBIC IDENTIFY: CPT

## 2023-01-01 PROCEDURE — 85245 CLOT FACTOR VIII VW RISTOCTN: CPT

## 2023-01-01 PROCEDURE — C1769 GUIDE WIRE: HCPCS | Performed by: THORACIC SURGERY (CARDIOTHORACIC VASCULAR SURGERY)

## 2023-01-01 PROCEDURE — 2709999900 HC NON-CHARGEABLE SUPPLY: Performed by: THORACIC SURGERY (CARDIOTHORACIC VASCULAR SURGERY)

## 2023-01-01 PROCEDURE — 81001 URINALYSIS AUTO W/SCOPE: CPT

## 2023-01-01 PROCEDURE — 80076 HEPATIC FUNCTION PANEL: CPT

## 2023-01-01 PROCEDURE — 99221 1ST HOSP IP/OBS SF/LOW 40: CPT | Performed by: CLINICAL NURSE SPECIALIST

## 2023-01-01 PROCEDURE — 97165 OT EVAL LOW COMPLEX 30 MIN: CPT

## 2023-01-01 PROCEDURE — 87186 SC STD MICRODIL/AGAR DIL: CPT

## 2023-01-01 PROCEDURE — 80069 RENAL FUNCTION PANEL: CPT

## 2023-01-01 PROCEDURE — 33863 ASCENDING AORTIC GRAFT: CPT

## 2023-01-01 PROCEDURE — 5A1522H EXTRACORPOREAL OXYGENATION, MEMBRANE, PERIPHERAL VENO-VENOUS: ICD-10-PCS | Performed by: THORACIC SURGERY (CARDIOTHORACIC VASCULAR SURGERY)

## 2023-01-01 PROCEDURE — C1729 CATH, DRAINAGE: HCPCS | Performed by: THORACIC SURGERY (CARDIOTHORACIC VASCULAR SURGERY)

## 2023-01-01 PROCEDURE — 6360000002 HC RX W HCPCS: Performed by: NURSE ANESTHETIST, CERTIFIED REGISTERED

## 2023-01-01 PROCEDURE — 93005 ELECTROCARDIOGRAM TRACING: CPT | Performed by: THORACIC SURGERY (CARDIOTHORACIC VASCULAR SURGERY)

## 2023-01-01 PROCEDURE — 87449 NOS EACH ORGANISM AG IA: CPT

## 2023-01-01 PROCEDURE — 33866 AORTIC HEMIARCH GRAFT: CPT

## 2023-01-01 PROCEDURE — 33948 ECMO/ECLS DAILY MGMT-VENOUS: CPT | Performed by: THORACIC SURGERY (CARDIOTHORACIC VASCULAR SURGERY)

## 2023-01-01 PROCEDURE — 99285 EMERGENCY DEPT VISIT HI MDM: CPT

## 2023-01-01 PROCEDURE — 31645 BRNCHSC W/THER ASPIR 1ST: CPT

## 2023-01-01 PROCEDURE — 93922 UPR/L XTREMITY ART 2 LEVELS: CPT

## 2023-01-01 PROCEDURE — 97161 PT EVAL LOW COMPLEX 20 MIN: CPT

## 2023-01-01 PROCEDURE — 99231 SBSQ HOSP IP/OBS SF/LOW 25: CPT | Performed by: CLINICAL NURSE SPECIALIST

## 2023-01-01 PROCEDURE — 85045 AUTOMATED RETICULOCYTE COUNT: CPT

## 2023-01-01 PROCEDURE — 3600000011 HC SURGERY LEVEL 1  ADDTL 15MIN

## 2023-01-01 PROCEDURE — 33866 AORTIC HEMIARCH GRAFT: CPT | Performed by: THORACIC SURGERY (CARDIOTHORACIC VASCULAR SURGERY)

## 2023-01-01 PROCEDURE — 87205 SMEAR GRAM STAIN: CPT

## 2023-01-01 PROCEDURE — 5A2204Z RESTORATION OF CARDIAC RHYTHM, SINGLE: ICD-10-PCS | Performed by: THORACIC SURGERY (CARDIOTHORACIC VASCULAR SURGERY)

## 2023-01-01 PROCEDURE — P9012 CRYOPRECIPITATE EACH UNIT: HCPCS

## 2023-01-01 PROCEDURE — 3700000001 HC ADD 15 MINUTES (ANESTHESIA): Performed by: THORACIC SURGERY (CARDIOTHORACIC VASCULAR SURGERY)

## 2023-01-01 PROCEDURE — 85240 CLOT FACTOR VIII AHG 1 STAGE: CPT

## 2023-01-01 PROCEDURE — 83036 HEMOGLOBIN GLYCOSYLATED A1C: CPT

## 2023-01-01 PROCEDURE — C1751 CATH, INF, PER/CENT/MIDLINE: HCPCS | Performed by: THORACIC SURGERY (CARDIOTHORACIC VASCULAR SURGERY)

## 2023-01-01 PROCEDURE — 88305 TISSUE EXAM BY PATHOLOGIST: CPT

## 2023-01-01 PROCEDURE — C1768 GRAFT, VASCULAR: HCPCS | Performed by: THORACIC SURGERY (CARDIOTHORACIC VASCULAR SURGERY)

## 2023-01-01 PROCEDURE — 5A1955Z RESPIRATORY VENTILATION, GREATER THAN 96 CONSECUTIVE HOURS: ICD-10-PCS | Performed by: THORACIC SURGERY (CARDIOTHORACIC VASCULAR SURGERY)

## 2023-01-01 PROCEDURE — 94002 VENT MGMT INPAT INIT DAY: CPT

## 2023-01-01 PROCEDURE — 93005 ELECTROCARDIOGRAM TRACING: CPT | Performed by: INTERNAL MEDICINE

## 2023-01-01 PROCEDURE — P9047 ALBUMIN (HUMAN), 25%, 50ML: HCPCS

## 2023-01-01 PROCEDURE — 2580000003 HC RX 258: Performed by: EMERGENCY MEDICINE

## 2023-01-01 PROCEDURE — 3600000001 HC SURGERY LEVEL 1  BASE

## 2023-01-01 PROCEDURE — 75635 CT ANGIO ABDOMINAL ARTERIES: CPT

## 2023-01-01 PROCEDURE — 85520 HEPARIN ASSAY: CPT

## 2023-01-01 PROCEDURE — 5A12012 PERFORMANCE OF CARDIAC OUTPUT, SINGLE, MANUAL: ICD-10-PCS | Performed by: THORACIC SURGERY (CARDIOTHORACIC VASCULAR SURGERY)

## 2023-01-01 PROCEDURE — 33952 ECMO/ECLS INSJ PRPH CANNULA: CPT | Performed by: THORACIC SURGERY (CARDIOTHORACIC VASCULAR SURGERY)

## 2023-01-01 PROCEDURE — 5A1221Z PERFORMANCE OF CARDIAC OUTPUT, CONTINUOUS: ICD-10-PCS | Performed by: THORACIC SURGERY (CARDIOTHORACIC VASCULAR SURGERY)

## 2023-01-01 PROCEDURE — 30233N1 TRANSFUSION OF NONAUTOLOGOUS RED BLOOD CELLS INTO PERIPHERAL VEIN, PERCUTANEOUS APPROACH: ICD-10-PCS | Performed by: THORACIC SURGERY (CARDIOTHORACIC VASCULAR SURGERY)

## 2023-01-01 PROCEDURE — P9059 PLASMA, FRZ BETWEEN 8-24HOUR: HCPCS

## 2023-01-01 PROCEDURE — 2720000010 HC SURG SUPPLY STERILE: Performed by: THORACIC SURGERY (CARDIOTHORACIC VASCULAR SURGERY)

## 2023-01-01 PROCEDURE — 83880 ASSAY OF NATRIURETIC PEPTIDE: CPT

## 2023-01-01 PROCEDURE — APPSS45 APP SPLIT SHARED TIME 31-45 MINUTES: Performed by: NURSE PRACTITIONER

## 2023-01-01 PROCEDURE — 99223 1ST HOSP IP/OBS HIGH 75: CPT | Performed by: THORACIC SURGERY (CARDIOTHORACIC VASCULAR SURGERY)

## 2023-01-01 PROCEDURE — 97167 OT EVAL HIGH COMPLEX 60 MIN: CPT

## 2023-01-01 PROCEDURE — 96365 THER/PROPH/DIAG IV INF INIT: CPT

## 2023-01-01 PROCEDURE — C1889 IMPLANT/INSERT DEVICE, NOC: HCPCS | Performed by: THORACIC SURGERY (CARDIOTHORACIC VASCULAR SURGERY)

## 2023-01-01 PROCEDURE — B246ZZ4 ULTRASONOGRAPHY OF RIGHT AND LEFT HEART, TRANSESOPHAGEAL: ICD-10-PCS | Performed by: THORACIC SURGERY (CARDIOTHORACIC VASCULAR SURGERY)

## 2023-01-01 PROCEDURE — 84439 ASSAY OF FREE THYROXINE: CPT

## 2023-01-01 DEVICE — HEMASHIELD PLATINUM WOVEN STRAIGHT DOUBLE VELOUR VASCULAR GRAFT WITH GRAFT SIZER ACCESSORY
Type: IMPLANTABLE DEVICE | Site: CHEST | Status: FUNCTIONAL
Brand: HEMASHIELD

## 2023-01-01 DEVICE — IMPLANTABLE DEVICE: Type: IMPLANTABLE DEVICE | Site: HEART | Status: FUNCTIONAL

## 2023-01-01 DEVICE — PUTTY BONE HEMSTAT ABSORB MTRX 2.0GRAM: Type: IMPLANTABLE DEVICE | Site: STERNUM | Status: FUNCTIONAL

## 2023-01-01 DEVICE — BARD® PTFE FELT, 15.2 CM X 15.2 CM
Type: IMPLANTABLE DEVICE | Site: AORTA | Status: FUNCTIONAL
Brand: BARD® PTFE FELT

## 2023-01-01 RX ORDER — AMIODARONE HYDROCHLORIDE 50 MG/ML
INJECTION, SOLUTION INTRAVENOUS
Status: DISCONTINUED
Start: 2023-01-01 | End: 2023-01-01 | Stop reason: WASHOUT

## 2023-01-01 RX ORDER — HYDROMORPHONE HYDROCHLORIDE 1 MG/ML
0.5 INJECTION, SOLUTION INTRAMUSCULAR; INTRAVENOUS; SUBCUTANEOUS EVERY 4 HOURS PRN
Status: DISCONTINUED | OUTPATIENT
Start: 2023-01-01 | End: 2023-01-01

## 2023-01-01 RX ORDER — NOREPINEPHRINE BITARTRATE 0.06 MG/ML
1-100 INJECTION, SOLUTION INTRAVENOUS CONTINUOUS
Status: DISCONTINUED | OUTPATIENT
Start: 2023-01-01 | End: 2023-01-01

## 2023-01-01 RX ORDER — INSULIN LISPRO 100 [IU]/ML
0-4 INJECTION, SOLUTION INTRAVENOUS; SUBCUTANEOUS EVERY 4 HOURS
Status: DISCONTINUED | OUTPATIENT
Start: 2023-01-01 | End: 2023-01-01

## 2023-01-01 RX ORDER — METRONIDAZOLE 500 MG/100ML
500 INJECTION, SOLUTION INTRAVENOUS EVERY 8 HOURS
Status: DISCONTINUED | OUTPATIENT
Start: 2023-01-01 | End: 2023-01-01

## 2023-01-01 RX ORDER — QUETIAPINE FUMARATE 25 MG/1
50 TABLET, FILM COATED ORAL ONCE
Status: DISCONTINUED | OUTPATIENT
Start: 2023-01-01 | End: 2023-01-01 | Stop reason: ALTCHOICE

## 2023-01-01 RX ORDER — METRONIDAZOLE 500 MG/100ML
500 INJECTION, SOLUTION INTRAVENOUS EVERY 8 HOURS
Status: DISCONTINUED | OUTPATIENT
Start: 2023-01-01 | End: 2023-01-01 | Stop reason: ALTCHOICE

## 2023-01-01 RX ORDER — QUETIAPINE FUMARATE 25 MG/1
50 TABLET, FILM COATED ORAL 2 TIMES DAILY
Status: DISCONTINUED | OUTPATIENT
Start: 2023-01-01 | End: 2023-01-01

## 2023-01-01 RX ORDER — PHENYLEPHRINE HCL IN 0.9% NACL 0.4MG/10ML
SYRINGE (ML) INTRAVENOUS
Status: DISPENSED
Start: 2023-01-01 | End: 2023-01-01

## 2023-01-01 RX ORDER — SODIUM CHLORIDE 9 MG/ML
INJECTION, SOLUTION INTRAVENOUS PRN
Status: DISCONTINUED | OUTPATIENT
Start: 2023-01-01 | End: 2023-01-01

## 2023-01-01 RX ORDER — SODIUM CHLORIDE 0.9 % (FLUSH) 0.9 %
5-40 SYRINGE (ML) INJECTION EVERY 8 HOURS
Status: DISCONTINUED | OUTPATIENT
Start: 2023-01-01 | End: 2023-01-01

## 2023-01-01 RX ORDER — POTASSIUM CHLORIDE 29.8 MG/ML
20 INJECTION INTRAVENOUS
Status: COMPLETED | OUTPATIENT
Start: 2023-01-01 | End: 2023-01-01

## 2023-01-01 RX ORDER — SODIUM CHLORIDE 0.9 % (FLUSH) 0.9 %
5-40 SYRINGE (ML) INJECTION EVERY 12 HOURS SCHEDULED
Status: DISCONTINUED | OUTPATIENT
Start: 2023-01-01 | End: 2023-01-01 | Stop reason: HOSPADM

## 2023-01-01 RX ORDER — MIDAZOLAM HYDROCHLORIDE 2 MG/2ML
1 INJECTION, SOLUTION INTRAMUSCULAR; INTRAVENOUS EVERY 5 MIN PRN
Status: DISCONTINUED | OUTPATIENT
Start: 2023-01-01 | End: 2023-01-01

## 2023-01-01 RX ORDER — FUROSEMIDE 10 MG/ML
60 INJECTION INTRAMUSCULAR; INTRAVENOUS 2 TIMES DAILY
Status: DISCONTINUED | OUTPATIENT
Start: 2023-01-01 | End: 2023-01-01

## 2023-01-01 RX ORDER — SODIUM CHLORIDE 9 MG/ML
INJECTION, SOLUTION INTRAVENOUS PRN
Status: DISCONTINUED | OUTPATIENT
Start: 2023-01-01 | End: 2023-01-01 | Stop reason: SDUPTHER

## 2023-01-01 RX ORDER — HYDROMORPHONE HYDROCHLORIDE 1 MG/ML
1 INJECTION, SOLUTION INTRAMUSCULAR; INTRAVENOUS; SUBCUTANEOUS ONCE
Status: COMPLETED | OUTPATIENT
Start: 2023-01-01 | End: 2023-01-01

## 2023-01-01 RX ORDER — BISACODYL 10 MG
10 SUPPOSITORY, RECTAL RECTAL DAILY PRN
Status: DISCONTINUED | OUTPATIENT
Start: 2023-01-01 | End: 2023-01-01

## 2023-01-01 RX ORDER — HYDROMORPHONE HYDROCHLORIDE 2 MG/1
2 TABLET ORAL EVERY 4 HOURS PRN
Status: DISCONTINUED | OUTPATIENT
Start: 2023-01-01 | End: 2023-01-01

## 2023-01-01 RX ORDER — POTASSIUM CHLORIDE 29.8 MG/ML
20 INJECTION INTRAVENOUS PRN
Status: DISCONTINUED | OUTPATIENT
Start: 2023-01-01 | End: 2023-01-01 | Stop reason: HOSPADM

## 2023-01-01 RX ORDER — INSULIN LISPRO 100 [IU]/ML
0-4 INJECTION, SOLUTION INTRAVENOUS; SUBCUTANEOUS EVERY 6 HOURS
Status: DISCONTINUED | OUTPATIENT
Start: 2023-01-01 | End: 2023-01-01

## 2023-01-01 RX ORDER — BUDESONIDE 0.5 MG/2ML
0.5 INHALANT ORAL
Status: DISCONTINUED | OUTPATIENT
Start: 2023-01-01 | End: 2023-01-01 | Stop reason: HOSPADM

## 2023-01-01 RX ORDER — ASPIRIN 81 MG/1
81 TABLET ORAL DAILY
Status: DISCONTINUED | OUTPATIENT
Start: 2023-01-01 | End: 2023-01-01

## 2023-01-01 RX ORDER — BUMETANIDE 0.25 MG/ML
1 INJECTION INTRAMUSCULAR; INTRAVENOUS EVERY 8 HOURS
Status: DISCONTINUED | OUTPATIENT
Start: 2023-01-01 | End: 2023-01-01

## 2023-01-01 RX ORDER — QUETIAPINE FUMARATE 25 MG/1
50 TABLET, FILM COATED ORAL NIGHTLY
Status: DISCONTINUED | OUTPATIENT
Start: 2023-01-01 | End: 2023-01-01

## 2023-01-01 RX ORDER — INSULIN LISPRO 100 [IU]/ML
0-4 INJECTION, SOLUTION INTRAVENOUS; SUBCUTANEOUS
Status: DISCONTINUED | OUTPATIENT
Start: 2023-01-01 | End: 2023-01-01

## 2023-01-01 RX ORDER — LIDOCAINE HYDROCHLORIDE 10 MG/ML
5 INJECTION, SOLUTION EPIDURAL; INFILTRATION; INTRACAUDAL; PERINEURAL ONCE
Status: COMPLETED | OUTPATIENT
Start: 2023-01-01 | End: 2023-01-01

## 2023-01-01 RX ORDER — INSULIN GLARGINE 100 [IU]/ML
15 INJECTION, SOLUTION SUBCUTANEOUS ONCE
Status: COMPLETED | OUTPATIENT
Start: 2023-01-01 | End: 2023-01-01

## 2023-01-01 RX ORDER — PROPOFOL 10 MG/ML
5-50 INJECTION, EMULSION INTRAVENOUS CONTINUOUS
Status: DISCONTINUED | OUTPATIENT
Start: 2023-01-01 | End: 2023-01-01 | Stop reason: HOSPADM

## 2023-01-01 RX ORDER — FUROSEMIDE 10 MG/ML
40 INJECTION INTRAMUSCULAR; INTRAVENOUS ONCE
Status: COMPLETED | OUTPATIENT
Start: 2023-01-01 | End: 2023-01-01

## 2023-01-01 RX ORDER — ROCURONIUM BROMIDE 10 MG/ML
100 INJECTION, SOLUTION INTRAVENOUS ONCE
Status: COMPLETED | OUTPATIENT
Start: 2023-01-01 | End: 2023-01-01

## 2023-01-01 RX ORDER — FUROSEMIDE 10 MG/ML
40 INJECTION INTRAMUSCULAR; INTRAVENOUS ONCE
Status: DISCONTINUED | OUTPATIENT
Start: 2023-01-01 | End: 2023-01-01

## 2023-01-01 RX ORDER — IPRATROPIUM BROMIDE AND ALBUTEROL SULFATE 2.5; .5 MG/3ML; MG/3ML
1 SOLUTION RESPIRATORY (INHALATION)
Status: DISCONTINUED | OUTPATIENT
Start: 2023-01-01 | End: 2023-01-01 | Stop reason: HOSPADM

## 2023-01-01 RX ORDER — POLYETHYLENE GLYCOL 3350 17 G/17G
17 POWDER, FOR SOLUTION ORAL DAILY
Status: DISCONTINUED | OUTPATIENT
Start: 2023-01-01 | End: 2023-01-01

## 2023-01-01 RX ORDER — DEXTROSE MONOHYDRATE 100 MG/ML
INJECTION, SOLUTION INTRAVENOUS CONTINUOUS PRN
Status: DISCONTINUED | OUTPATIENT
Start: 2023-01-01 | End: 2023-01-01 | Stop reason: HOSPADM

## 2023-01-01 RX ORDER — FUROSEMIDE 10 MG/ML
20 INJECTION INTRAMUSCULAR; INTRAVENOUS ONCE
Status: COMPLETED | OUTPATIENT
Start: 2023-01-01 | End: 2023-01-01

## 2023-01-01 RX ORDER — HEPARIN SODIUM 10000 [USP'U]/ML
INJECTION, SOLUTION INTRAVENOUS; SUBCUTANEOUS PRN
Status: DISCONTINUED | OUTPATIENT
Start: 2023-01-01 | End: 2023-01-01 | Stop reason: ALTCHOICE

## 2023-01-01 RX ORDER — POTASSIUM CHLORIDE 29.8 MG/ML
20 INJECTION INTRAVENOUS ONCE
Status: DISCONTINUED | OUTPATIENT
Start: 2023-01-01 | End: 2023-01-01

## 2023-01-01 RX ORDER — INSULIN GLARGINE 100 [IU]/ML
10 INJECTION, SOLUTION SUBCUTANEOUS NIGHTLY
Status: DISCONTINUED | OUTPATIENT
Start: 2023-01-01 | End: 2023-01-01

## 2023-01-01 RX ORDER — MORPHINE SULFATE 2 MG/ML
2 INJECTION, SOLUTION INTRAMUSCULAR; INTRAVENOUS ONCE
Status: COMPLETED | OUTPATIENT
Start: 2023-01-01 | End: 2023-01-01

## 2023-01-01 RX ORDER — AMIODARONE HYDROCHLORIDE 200 MG/1
400 TABLET ORAL 2 TIMES DAILY
Status: CANCELLED | OUTPATIENT
Start: 2023-01-01

## 2023-01-01 RX ORDER — ONDANSETRON 2 MG/ML
4 INJECTION INTRAMUSCULAR; INTRAVENOUS EVERY 4 HOURS PRN
Status: DISCONTINUED | OUTPATIENT
Start: 2023-01-01 | End: 2023-01-01 | Stop reason: HOSPADM

## 2023-01-01 RX ORDER — CEFAZOLIN SODIUM 1 G/3ML
INJECTION, POWDER, FOR SOLUTION INTRAMUSCULAR; INTRAVENOUS PRN
Status: DISCONTINUED | OUTPATIENT
Start: 2023-01-01 | End: 2023-01-01 | Stop reason: ALTCHOICE

## 2023-01-01 RX ORDER — ACETYLCYSTEINE 200 MG/ML
600 SOLUTION ORAL; RESPIRATORY (INHALATION)
Status: DISCONTINUED | OUTPATIENT
Start: 2023-01-01 | End: 2023-01-01

## 2023-01-01 RX ORDER — ESMOLOL HYDROCHLORIDE 10 MG/ML
25-300 INJECTION, SOLUTION INTRAVENOUS CONTINUOUS
Status: DISCONTINUED | OUTPATIENT
Start: 2023-01-01 | End: 2023-01-01

## 2023-01-01 RX ORDER — CLONAZEPAM 0.5 MG/1
1 TABLET ORAL EVERY 12 HOURS
Status: DISCONTINUED | OUTPATIENT
Start: 2023-01-01 | End: 2023-01-01

## 2023-01-01 RX ORDER — MIDAZOLAM HYDROCHLORIDE 2 MG/2ML
1 INJECTION, SOLUTION INTRAMUSCULAR; INTRAVENOUS ONCE
Status: COMPLETED | OUTPATIENT
Start: 2023-01-01 | End: 2023-01-01

## 2023-01-01 RX ORDER — CASTOR OIL AND BALSAM, PERU 788; 87 MG/G; MG/G
OINTMENT TOPICAL 2 TIMES DAILY
Status: DISCONTINUED | OUTPATIENT
Start: 2023-01-01 | End: 2023-01-01

## 2023-01-01 RX ORDER — ACETAMINOPHEN 325 MG/1
975 TABLET ORAL EVERY 6 HOURS PRN
Status: DISCONTINUED | OUTPATIENT
Start: 2023-01-01 | End: 2023-01-01

## 2023-01-01 RX ORDER — CALCIUM GLUCONATE 20 MG/ML
1000 INJECTION, SOLUTION INTRAVENOUS ONCE
Status: CANCELLED | OUTPATIENT
Start: 2023-01-01 | End: 2023-01-01

## 2023-01-01 RX ORDER — AMIODARONE HYDROCHLORIDE 200 MG/1
400 TABLET ORAL 2 TIMES DAILY
Status: DISCONTINUED | OUTPATIENT
Start: 2023-01-01 | End: 2023-01-01

## 2023-01-01 RX ORDER — FENTANYL CITRATE-0.9 % NACL/PF 10 MCG/ML
25-200 PLASTIC BAG, INJECTION (ML) INTRAVENOUS CONTINUOUS
Status: DISCONTINUED | OUTPATIENT
Start: 2023-01-01 | End: 2023-01-01

## 2023-01-01 RX ORDER — CHLORHEXIDINE GLUCONATE ORAL RINSE 1.2 MG/ML
15 SOLUTION DENTAL 2 TIMES DAILY
Status: DISCONTINUED | OUTPATIENT
Start: 2023-01-01 | End: 2023-01-01

## 2023-01-01 RX ORDER — EPINEPHRINE 0.1 MG/ML
INJECTION INTRAVENOUS
Status: DISCONTINUED
Start: 2023-01-01 | End: 2023-01-01 | Stop reason: WASHOUT

## 2023-01-01 RX ORDER — ESMOLOL HYDROCHLORIDE 10 MG/ML
25-300 INJECTION, SOLUTION INTRAVENOUS CONTINUOUS
Status: DISCONTINUED | OUTPATIENT
Start: 2023-01-01 | End: 2023-01-01 | Stop reason: SDUPTHER

## 2023-01-01 RX ORDER — MIDAZOLAM HYDROCHLORIDE 2 MG/2ML
1 INJECTION, SOLUTION INTRAMUSCULAR; INTRAVENOUS
Status: DISCONTINUED | OUTPATIENT
Start: 2023-01-01 | End: 2023-01-01

## 2023-01-01 RX ORDER — WATER 10 ML/10ML
INJECTION INTRAMUSCULAR; INTRAVENOUS; SUBCUTANEOUS
Status: COMPLETED
Start: 2023-01-01 | End: 2023-01-01

## 2023-01-01 RX ORDER — BUMETANIDE 0.25 MG/ML
2 INJECTION INTRAMUSCULAR; INTRAVENOUS EVERY 8 HOURS
Status: DISCONTINUED | OUTPATIENT
Start: 2023-01-01 | End: 2023-01-01

## 2023-01-01 RX ORDER — BISACODYL 10 MG
10 SUPPOSITORY, RECTAL RECTAL ONCE
Status: COMPLETED | OUTPATIENT
Start: 2023-01-01 | End: 2023-01-01

## 2023-01-01 RX ORDER — ETOMIDATE 2 MG/ML
30 INJECTION INTRAVENOUS ONCE
Status: COMPLETED | OUTPATIENT
Start: 2023-01-01 | End: 2023-01-01

## 2023-01-01 RX ORDER — DEXTROSE MONOHYDRATE 100 MG/ML
INJECTION, SOLUTION INTRAVENOUS CONTINUOUS PRN
Status: DISCONTINUED | OUTPATIENT
Start: 2023-01-01 | End: 2023-01-01 | Stop reason: SDUPTHER

## 2023-01-01 RX ORDER — DIPHENHYDRAMINE HCL 25 MG
25 CAPSULE ORAL NIGHTLY PRN
Status: DISCONTINUED | OUTPATIENT
Start: 2023-01-01 | End: 2023-01-01

## 2023-01-01 RX ORDER — ATROPINE SULFATE 0.1 MG/ML
INJECTION INTRAVENOUS
Status: DISCONTINUED
Start: 2023-01-01 | End: 2023-01-01 | Stop reason: WASHOUT

## 2023-01-01 RX ORDER — HYDRALAZINE HYDROCHLORIDE 20 MG/ML
10 INJECTION INTRAMUSCULAR; INTRAVENOUS EVERY 6 HOURS PRN
Status: DISCONTINUED | OUTPATIENT
Start: 2023-01-01 | End: 2023-01-01 | Stop reason: HOSPADM

## 2023-01-01 RX ORDER — PROPOFOL 10 MG/ML
5-50 INJECTION, EMULSION INTRAVENOUS CONTINUOUS
Status: DISCONTINUED | OUTPATIENT
Start: 2023-01-01 | End: 2023-01-01

## 2023-01-01 RX ORDER — FUROSEMIDE 10 MG/ML
40 INJECTION INTRAMUSCULAR; INTRAVENOUS EVERY 12 HOURS
Status: DISCONTINUED | OUTPATIENT
Start: 2023-01-01 | End: 2023-01-01

## 2023-01-01 RX ORDER — TOBRAMYCIN INHALATION SOLUTION 300 MG/5ML
300 INHALANT RESPIRATORY (INHALATION)
Status: DISCONTINUED | OUTPATIENT
Start: 2023-01-01 | End: 2023-01-01

## 2023-01-01 RX ORDER — FENTANYL CITRATE 50 UG/ML
100 INJECTION, SOLUTION INTRAMUSCULAR; INTRAVENOUS ONCE
Status: COMPLETED | OUTPATIENT
Start: 2023-01-01 | End: 2023-01-01

## 2023-01-01 RX ORDER — NOREPINEPHRINE BITARTRATE 0.06 MG/ML
1-100 INJECTION, SOLUTION INTRAVENOUS CONTINUOUS
Status: DISCONTINUED | OUTPATIENT
Start: 2023-01-01 | End: 2023-01-01 | Stop reason: HOSPADM

## 2023-01-01 RX ORDER — MIDAZOLAM HYDROCHLORIDE 2 MG/2ML
2 INJECTION, SOLUTION INTRAMUSCULAR; INTRAVENOUS ONCE
Status: COMPLETED | OUTPATIENT
Start: 2023-01-01 | End: 2023-01-01

## 2023-01-01 RX ORDER — HYDROMORPHONE HYDROCHLORIDE 1 MG/ML
2 INJECTION, SOLUTION INTRAMUSCULAR; INTRAVENOUS; SUBCUTANEOUS
Status: DISCONTINUED | OUTPATIENT
Start: 2023-01-01 | End: 2023-01-01 | Stop reason: HOSPADM

## 2023-01-01 RX ORDER — INSULIN LISPRO 100 [IU]/ML
0-12 INJECTION, SOLUTION INTRAVENOUS; SUBCUTANEOUS
Status: DISCONTINUED | OUTPATIENT
Start: 2023-01-01 | End: 2023-01-01 | Stop reason: SDUPTHER

## 2023-01-01 RX ORDER — ALBUMIN (HUMAN) 12.5 G/50ML
25 SOLUTION INTRAVENOUS ONCE
Status: COMPLETED | OUTPATIENT
Start: 2023-01-01 | End: 2023-01-01

## 2023-01-01 RX ORDER — MORPHINE SULFATE 4 MG/ML
6 INJECTION, SOLUTION INTRAMUSCULAR; INTRAVENOUS ONCE
Status: COMPLETED | OUTPATIENT
Start: 2023-01-01 | End: 2023-01-01

## 2023-01-01 RX ORDER — ALBUMIN, HUMAN INJ 5% 5 %
12.5 SOLUTION INTRAVENOUS ONCE
Status: COMPLETED | OUTPATIENT
Start: 2023-01-01 | End: 2023-01-01

## 2023-01-01 RX ORDER — MIDAZOLAM HYDROCHLORIDE 2 MG/2ML
4 INJECTION, SOLUTION INTRAMUSCULAR; INTRAVENOUS ONCE
Status: COMPLETED | OUTPATIENT
Start: 2023-01-01 | End: 2023-01-01

## 2023-01-01 RX ORDER — METOLAZONE 5 MG/1
5 TABLET ORAL ONCE
Status: COMPLETED | OUTPATIENT
Start: 2023-01-01 | End: 2023-01-01

## 2023-01-01 RX ORDER — LANOLIN ALCOHOL/MO/W.PET/CERES
6 CREAM (GRAM) TOPICAL NIGHTLY PRN
Status: DISCONTINUED | OUTPATIENT
Start: 2023-01-01 | End: 2023-01-01

## 2023-01-01 RX ORDER — FUROSEMIDE 10 MG/ML
20 INJECTION INTRAMUSCULAR; INTRAVENOUS ONCE
Status: DISCONTINUED | OUTPATIENT
Start: 2023-01-01 | End: 2023-01-01

## 2023-01-01 RX ORDER — INSULIN GLARGINE 100 [IU]/ML
12 INJECTION, SOLUTION SUBCUTANEOUS NIGHTLY
Status: DISCONTINUED | OUTPATIENT
Start: 2023-01-01 | End: 2023-01-01

## 2023-01-01 RX ORDER — LIDOCAINE 4 G/G
2 PATCH TOPICAL DAILY
Status: DISCONTINUED | OUTPATIENT
Start: 2023-01-01 | End: 2023-01-01 | Stop reason: HOSPADM

## 2023-01-01 RX ORDER — DEXMEDETOMIDINE HYDROCHLORIDE 4 UG/ML
.1-1.5 INJECTION, SOLUTION INTRAVENOUS CONTINUOUS
Status: DISCONTINUED | OUTPATIENT
Start: 2023-01-01 | End: 2023-01-01 | Stop reason: HOSPADM

## 2023-01-01 RX ORDER — SODIUM CHLORIDE 450 MG/100ML
INJECTION, SOLUTION INTRAVENOUS CONTINUOUS
Status: DISCONTINUED | OUTPATIENT
Start: 2023-01-01 | End: 2023-01-01 | Stop reason: HOSPADM

## 2023-01-01 RX ORDER — CASTOR OIL AND BALSAM, PERU 788; 87 MG/G; MG/G
OINTMENT TOPICAL 2 TIMES DAILY
Status: DISCONTINUED | OUTPATIENT
Start: 2023-01-01 | End: 2023-01-01 | Stop reason: SDUPTHER

## 2023-01-01 RX ORDER — CLONAZEPAM 0.5 MG/1
1 TABLET ORAL EVERY 8 HOURS
Status: DISCONTINUED | OUTPATIENT
Start: 2023-01-01 | End: 2023-01-01

## 2023-01-01 RX ORDER — SODIUM CHLORIDE 0.9 % (FLUSH) 0.9 %
5-40 SYRINGE (ML) INJECTION PRN
Status: DISCONTINUED | OUTPATIENT
Start: 2023-01-01 | End: 2023-01-01 | Stop reason: HOSPADM

## 2023-01-01 RX ORDER — INSULIN LISPRO 100 [IU]/ML
0-6 INJECTION, SOLUTION INTRAVENOUS; SUBCUTANEOUS NIGHTLY
Status: DISCONTINUED | OUTPATIENT
Start: 2023-01-01 | End: 2023-01-01 | Stop reason: SDUPTHER

## 2023-01-01 RX ORDER — MIDAZOLAM HYDROCHLORIDE 2 MG/2ML
2 INJECTION, SOLUTION INTRAMUSCULAR; INTRAVENOUS
Status: DISCONTINUED | OUTPATIENT
Start: 2023-01-01 | End: 2023-01-01 | Stop reason: HOSPADM

## 2023-01-01 RX ORDER — SODIUM CHLORIDE 9 MG/ML
INJECTION, SOLUTION INTRAVENOUS PRN
Status: COMPLETED | OUTPATIENT
Start: 2023-01-01 | End: 2023-01-01

## 2023-01-01 RX ORDER — HYDROMORPHONE HYDROCHLORIDE 1 MG/ML
1 INJECTION, SOLUTION INTRAMUSCULAR; INTRAVENOUS; SUBCUTANEOUS
Status: DISCONTINUED | OUTPATIENT
Start: 2023-01-01 | End: 2023-01-01 | Stop reason: HOSPADM

## 2023-01-01 RX ORDER — ALBUMIN, HUMAN INJ 5% 5 %
12.5 SOLUTION INTRAVENOUS PRN
Status: DISCONTINUED | OUTPATIENT
Start: 2023-01-01 | End: 2023-01-01 | Stop reason: HOSPADM

## 2023-01-01 RX ORDER — MIDAZOLAM HYDROCHLORIDE 2 MG/2ML
2 INJECTION, SOLUTION INTRAMUSCULAR; INTRAVENOUS
Status: COMPLETED | OUTPATIENT
Start: 2023-01-01 | End: 2023-01-01

## 2023-01-01 RX ORDER — SENNA AND DOCUSATE SODIUM 50; 8.6 MG/1; MG/1
1 TABLET, FILM COATED ORAL 2 TIMES DAILY
Status: DISCONTINUED | OUTPATIENT
Start: 2023-01-01 | End: 2023-01-01

## 2023-01-01 RX ORDER — FUROSEMIDE 10 MG/ML
20 INJECTION INTRAMUSCULAR; INTRAVENOUS 2 TIMES DAILY
Status: DISCONTINUED | OUTPATIENT
Start: 2023-01-01 | End: 2023-01-01

## 2023-01-01 RX ORDER — FENTANYL CITRATE 50 UG/ML
INJECTION, SOLUTION INTRAMUSCULAR; INTRAVENOUS
Status: COMPLETED
Start: 2023-01-01 | End: 2023-01-01

## 2023-01-01 RX ORDER — HYDROMORPHONE HYDROCHLORIDE 1 MG/ML
1 INJECTION, SOLUTION INTRAMUSCULAR; INTRAVENOUS; SUBCUTANEOUS EVERY 4 HOURS PRN
Status: DISCONTINUED | OUTPATIENT
Start: 2023-01-01 | End: 2023-01-01

## 2023-01-01 RX ORDER — LANOLIN ALCOHOL/MO/W.PET/CERES
400 CREAM (GRAM) TOPICAL 2 TIMES DAILY
Status: DISCONTINUED | OUTPATIENT
Start: 2023-01-01 | End: 2023-01-01

## 2023-01-01 RX ORDER — ALBUTEROL SULFATE 2.5 MG/3ML
2.5 SOLUTION RESPIRATORY (INHALATION) EVERY 4 HOURS PRN
Status: DISCONTINUED | OUTPATIENT
Start: 2023-01-01 | End: 2023-01-01 | Stop reason: HOSPADM

## 2023-01-01 RX ORDER — NOREPINEPHRINE BITARTRATE 0.06 MG/ML
INJECTION, SOLUTION INTRAVENOUS
Status: DISPENSED
Start: 2023-01-01 | End: 2023-01-01

## 2023-01-01 RX ORDER — HEPARIN SODIUM 5000 [USP'U]/ML
5000 INJECTION, SOLUTION INTRAVENOUS; SUBCUTANEOUS EVERY 8 HOURS SCHEDULED
Status: DISCONTINUED | OUTPATIENT
Start: 2023-01-01 | End: 2023-01-01

## 2023-01-01 RX ORDER — INSULIN LISPRO 100 [IU]/ML
0-4 INJECTION, SOLUTION INTRAVENOUS; SUBCUTANEOUS NIGHTLY
Status: DISCONTINUED | OUTPATIENT
Start: 2023-01-01 | End: 2023-01-01

## 2023-01-01 RX ORDER — OXYCODONE HYDROCHLORIDE 5 MG/1
5 TABLET ORAL EVERY 4 HOURS PRN
Status: DISCONTINUED | OUTPATIENT
Start: 2023-01-01 | End: 2023-01-01

## 2023-01-01 RX ORDER — OXYCODONE HYDROCHLORIDE 5 MG/1
10 TABLET ORAL EVERY 4 HOURS PRN
Status: DISCONTINUED | OUTPATIENT
Start: 2023-01-01 | End: 2023-01-01

## 2023-01-01 RX ORDER — ATORVASTATIN CALCIUM 40 MG/1
40 TABLET, FILM COATED ORAL NIGHTLY
Status: DISCONTINUED | OUTPATIENT
Start: 2023-01-01 | End: 2023-01-01 | Stop reason: HOSPADM

## 2023-01-01 RX ORDER — INSULIN GLARGINE 100 [IU]/ML
1-50 INJECTION, SOLUTION SUBCUTANEOUS
Status: ACTIVE | OUTPATIENT
Start: 2023-01-01 | End: 2023-01-01

## 2023-01-01 RX ORDER — MIDAZOLAM HYDROCHLORIDE 2 MG/2ML
1 INJECTION, SOLUTION INTRAMUSCULAR; INTRAVENOUS
Status: DISCONTINUED | OUTPATIENT
Start: 2023-01-01 | End: 2023-01-01 | Stop reason: SDUPTHER

## 2023-01-01 RX ORDER — GLYCOPYRROLATE 0.2 MG/ML
0.2 INJECTION INTRAMUSCULAR; INTRAVENOUS
Status: DISCONTINUED | OUTPATIENT
Start: 2023-01-01 | End: 2023-01-01 | Stop reason: HOSPADM

## 2023-01-01 RX ORDER — MIDAZOLAM HYDROCHLORIDE 1 MG/ML
1-10 INJECTION, SOLUTION INTRAVENOUS CONTINUOUS
Status: CANCELLED | OUTPATIENT
Start: 2023-01-01

## 2023-01-01 RX ORDER — MINERAL OIL AND WHITE PETROLATUM 150; 830 MG/G; MG/G
OINTMENT OPHTHALMIC PRN
Status: DISCONTINUED | OUTPATIENT
Start: 2023-01-01 | End: 2023-01-01 | Stop reason: HOSPADM

## 2023-01-01 RX ORDER — FUROSEMIDE 10 MG/ML
40 INJECTION INTRAMUSCULAR; INTRAVENOUS 2 TIMES DAILY
Status: DISCONTINUED | OUTPATIENT
Start: 2023-01-01 | End: 2023-01-01

## 2023-01-01 RX ORDER — FUROSEMIDE 10 MG/ML
60 INJECTION INTRAMUSCULAR; INTRAVENOUS ONCE
Status: COMPLETED | OUTPATIENT
Start: 2023-01-01 | End: 2023-01-01

## 2023-01-01 RX ORDER — ALPRAZOLAM 0.5 MG/1
0.5 TABLET ORAL NIGHTLY PRN
Status: DISCONTINUED | OUTPATIENT
Start: 2023-01-01 | End: 2023-01-01

## 2023-01-01 RX ORDER — SODIUM CHLORIDE, SODIUM LACTATE, POTASSIUM CHLORIDE, AND CALCIUM CHLORIDE .6; .31; .03; .02 G/100ML; G/100ML; G/100ML; G/100ML
1000 INJECTION, SOLUTION INTRAVENOUS ONCE
Status: COMPLETED | OUTPATIENT
Start: 2023-01-01 | End: 2023-01-01

## 2023-01-01 RX ORDER — MAGNESIUM SULFATE IN WATER 40 MG/ML
2000 INJECTION, SOLUTION INTRAVENOUS PRN
Status: DISCONTINUED | OUTPATIENT
Start: 2023-01-01 | End: 2023-01-01 | Stop reason: HOSPADM

## 2023-01-01 RX ORDER — DEXTROSE AND SODIUM CHLORIDE 5; .2 G/100ML; G/100ML
INJECTION, SOLUTION INTRAVENOUS CONTINUOUS
Status: DISCONTINUED | OUTPATIENT
Start: 2023-01-01 | End: 2023-01-01

## 2023-01-01 RX ORDER — HYDRALAZINE HYDROCHLORIDE 20 MG/ML
10 INJECTION INTRAMUSCULAR; INTRAVENOUS ONCE
Status: COMPLETED | OUTPATIENT
Start: 2023-01-01 | End: 2023-01-01

## 2023-01-01 RX ORDER — LORAZEPAM 2 MG/ML
1 INJECTION INTRAMUSCULAR EVERY 6 HOURS PRN
Status: DISCONTINUED | OUTPATIENT
Start: 2023-01-01 | End: 2023-01-01 | Stop reason: HOSPADM

## 2023-01-01 RX ORDER — DEXTROSE MONOHYDRATE 50 MG/ML
INJECTION, SOLUTION INTRAVENOUS CONTINUOUS
Status: DISCONTINUED | OUTPATIENT
Start: 2023-01-01 | End: 2023-01-01

## 2023-01-01 RX ORDER — FAMOTIDINE 20 MG/1
20 TABLET, FILM COATED ORAL 2 TIMES DAILY
Status: DISPENSED | OUTPATIENT
Start: 2023-01-01 | End: 2023-01-01

## 2023-01-01 RX ORDER — DOBUTAMINE HYDROCHLORIDE 200 MG/100ML
0-10 INJECTION INTRAVENOUS CONTINUOUS
Status: DISCONTINUED | OUTPATIENT
Start: 2023-01-01 | End: 2023-01-01

## 2023-01-01 RX ORDER — MORPHINE SULFATE 2 MG/ML
2 INJECTION, SOLUTION INTRAMUSCULAR; INTRAVENOUS
Status: DISCONTINUED | OUTPATIENT
Start: 2023-01-01 | End: 2023-01-01 | Stop reason: HOSPADM

## 2023-01-01 RX ORDER — INSULIN LISPRO 100 [IU]/ML
1-20 INJECTION, SOLUTION INTRAVENOUS; SUBCUTANEOUS
Status: DISCONTINUED | OUTPATIENT
Start: 2023-01-01 | End: 2023-01-01 | Stop reason: SDUPTHER

## 2023-01-01 RX ORDER — ALBUMIN, HUMAN INJ 5% 5 %
12.5 SOLUTION INTRAVENOUS
Status: COMPLETED | OUTPATIENT
Start: 2023-01-01 | End: 2023-01-01

## 2023-01-01 RX ORDER — HYDROMORPHONE HYDROCHLORIDE 2 MG/1
1 TABLET ORAL EVERY 4 HOURS PRN
Status: DISCONTINUED | OUTPATIENT
Start: 2023-01-01 | End: 2023-01-01

## 2023-01-01 RX ORDER — SODIUM CHLORIDE 0.9 % (FLUSH) 0.9 %
5-40 SYRINGE (ML) INJECTION PRN
Status: DISCONTINUED | OUTPATIENT
Start: 2023-01-01 | End: 2023-01-01

## 2023-01-01 RX ORDER — ACETAMINOPHEN 325 MG/1
975 TABLET ORAL EVERY 12 HOURS PRN
Status: DISCONTINUED | OUTPATIENT
Start: 2023-01-01 | End: 2023-01-01 | Stop reason: HOSPADM

## 2023-01-01 RX ORDER — SODIUM CHLORIDE 9 MG/ML
INJECTION, SOLUTION INTRAVENOUS CONTINUOUS
Status: DISCONTINUED | OUTPATIENT
Start: 2023-01-01 | End: 2023-01-01 | Stop reason: HOSPADM

## 2023-01-01 RX ADMIN — IPRATROPIUM BROMIDE AND ALBUTEROL SULFATE 1 DOSE: 2.5; .5 SOLUTION RESPIRATORY (INHALATION) at 11:06

## 2023-01-01 RX ADMIN — POTASSIUM CHLORIDE 20 MEQ: 400 INJECTION, SOLUTION INTRAVENOUS at 07:38

## 2023-01-01 RX ADMIN — IPRATROPIUM BROMIDE AND ALBUTEROL SULFATE 1 DOSE: 2.5; .5 SOLUTION RESPIRATORY (INHALATION) at 20:08

## 2023-01-01 RX ADMIN — IPRATROPIUM BROMIDE AND ALBUTEROL SULFATE 1 DOSE: 2.5; .5 SOLUTION RESPIRATORY (INHALATION) at 20:05

## 2023-01-01 RX ADMIN — NYSTATIN 500000 UNITS: 100000 SUSPENSION ORAL at 17:07

## 2023-01-01 RX ADMIN — INSULIN LISPRO 1 UNITS: 100 INJECTION, SOLUTION INTRAVENOUS; SUBCUTANEOUS at 13:22

## 2023-01-01 RX ADMIN — POTASSIUM BICARBONATE 40 MEQ: 782 TABLET, EFFERVESCENT ORAL at 20:08

## 2023-01-01 RX ADMIN — PROPOFOL 30 MCG/KG/MIN: 10 INJECTION, EMULSION INTRAVENOUS at 12:00

## 2023-01-01 RX ADMIN — CHLORHEXIDINE GLUCONATE 15 ML: 1.2 RINSE ORAL at 20:40

## 2023-01-01 RX ADMIN — DEXMEDETOMIDINE HYDROCHLORIDE 1.5 MCG/KG/HR: 400 INJECTION, SOLUTION INTRAVENOUS at 22:28

## 2023-01-01 RX ADMIN — BUDESONIDE 500 MCG: 0.5 INHALANT RESPIRATORY (INHALATION) at 08:05

## 2023-01-01 RX ADMIN — Medication 16 UNITS: at 12:36

## 2023-01-01 RX ADMIN — VANCOMYCIN HYDROCHLORIDE 1000 MG: 1 INJECTION, POWDER, LYOPHILIZED, FOR SOLUTION INTRAVENOUS at 11:02

## 2023-01-01 RX ADMIN — LIDOCAINE HYDROCHLORIDE 5 ML: 10 INJECTION, SOLUTION EPIDURAL; INFILTRATION; INTRACAUDAL; PERINEURAL at 09:38

## 2023-01-01 RX ADMIN — TOBRAMYCIN 300 MG: 300 SOLUTION ORAL at 19:51

## 2023-01-01 RX ADMIN — CHLORHEXIDINE GLUCONATE 15 ML: 1.2 RINSE ORAL at 20:30

## 2023-01-01 RX ADMIN — BUMETANIDE 1 MG: 0.25 INJECTION, SOLUTION INTRAMUSCULAR; INTRAVENOUS at 00:00

## 2023-01-01 RX ADMIN — WHITE PETROLATUM 57.7 %-MINERAL OIL 31.9 % EYE OINTMENT: at 04:20

## 2023-01-01 RX ADMIN — CHLORHEXIDINE GLUCONATE 15 ML: 1.2 RINSE ORAL at 20:35

## 2023-01-01 RX ADMIN — IPRATROPIUM BROMIDE AND ALBUTEROL SULFATE 1 DOSE: 2.5; .5 SOLUTION RESPIRATORY (INHALATION) at 08:22

## 2023-01-01 RX ADMIN — EPINEPHRINE 1 MCG/MIN: 1 INJECTION INTRAMUSCULAR; INTRAVENOUS; SUBCUTANEOUS at 20:25

## 2023-01-01 RX ADMIN — CHLORHEXIDINE GLUCONATE 15 ML: 1.2 RINSE ORAL at 08:56

## 2023-01-01 RX ADMIN — LIDOCAINE HYDROCHLORIDE 5 ML: 10 INJECTION, SOLUTION EPIDURAL; INFILTRATION; INTRACAUDAL; PERINEURAL at 09:47

## 2023-01-01 RX ADMIN — POTASSIUM BICARBONATE 40 MEQ: 782 TABLET, EFFERVESCENT ORAL at 20:54

## 2023-01-01 RX ADMIN — HYDROMORPHONE HYDROCHLORIDE 1 MG: 1 INJECTION, SOLUTION INTRAMUSCULAR; INTRAVENOUS; SUBCUTANEOUS at 12:38

## 2023-01-01 RX ADMIN — NYSTATIN 500000 UNITS: 100000 SUSPENSION ORAL at 21:00

## 2023-01-01 RX ADMIN — PROPOFOL 10 MCG/KG/MIN: 10 INJECTION, EMULSION INTRAVENOUS at 10:36

## 2023-01-01 RX ADMIN — PHENYLEPHRINE HYDROCHLORIDE 100 MCG/MIN: 10 INJECTION INTRAVENOUS at 19:47

## 2023-01-01 RX ADMIN — DEXMEDETOMIDINE HYDROCHLORIDE 0.4 MCG/KG/HR: 400 INJECTION, SOLUTION INTRAVENOUS at 15:30

## 2023-01-01 RX ADMIN — LORAZEPAM 1 MG: 2 INJECTION INTRAMUSCULAR; INTRAVENOUS at 10:49

## 2023-01-01 RX ADMIN — SODIUM CHLORIDE, PRESERVATIVE FREE 10 ML: 5 INJECTION INTRAVENOUS at 21:24

## 2023-01-01 RX ADMIN — DEXMEDETOMIDINE HYDROCHLORIDE 1.5 MCG/KG/HR: 400 INJECTION, SOLUTION INTRAVENOUS at 15:24

## 2023-01-01 RX ADMIN — PANTOPRAZOLE SODIUM 40 MG: 40 INJECTION, POWDER, FOR SOLUTION INTRAVENOUS at 08:17

## 2023-01-01 RX ADMIN — CLONAZEPAM 1 MG: 0.5 TABLET ORAL at 01:30

## 2023-01-01 RX ADMIN — AMIODARONE HYDROCHLORIDE 400 MG: 200 TABLET ORAL at 08:21

## 2023-01-01 RX ADMIN — IPRATROPIUM BROMIDE AND ALBUTEROL SULFATE 1 DOSE: 2.5; .5 SOLUTION RESPIRATORY (INHALATION) at 19:59

## 2023-01-01 RX ADMIN — HEPARIN SODIUM 5000 UNITS: 5000 INJECTION INTRAVENOUS; SUBCUTANEOUS at 22:03

## 2023-01-01 RX ADMIN — POTASSIUM CHLORIDE 20 MEQ: 400 INJECTION, SOLUTION INTRAVENOUS at 17:38

## 2023-01-01 RX ADMIN — FUROSEMIDE 40 MG: 10 INJECTION, SOLUTION INTRAMUSCULAR; INTRAVENOUS at 01:25

## 2023-01-01 RX ADMIN — BUMETANIDE 2 MG: 0.25 INJECTION INTRAMUSCULAR; INTRAVENOUS at 15:04

## 2023-01-01 RX ADMIN — MIDAZOLAM 1 MG: 1 INJECTION INTRAMUSCULAR; INTRAVENOUS at 04:33

## 2023-01-01 RX ADMIN — MIDAZOLAM 1 MG: 1 INJECTION INTRAMUSCULAR; INTRAVENOUS at 06:41

## 2023-01-01 RX ADMIN — MIDAZOLAM 1 MG: 1 INJECTION INTRAMUSCULAR; INTRAVENOUS at 20:13

## 2023-01-01 RX ADMIN — TOBRAMYCIN 300 MG: 300 SOLUTION ORAL at 08:19

## 2023-01-01 RX ADMIN — FUROSEMIDE 40 MG: 10 INJECTION, SOLUTION INTRAMUSCULAR; INTRAVENOUS at 20:20

## 2023-01-01 RX ADMIN — DEXMEDETOMIDINE HYDROCHLORIDE 1.5 MCG/KG/HR: 400 INJECTION, SOLUTION INTRAVENOUS at 06:00

## 2023-01-01 RX ADMIN — DEXMEDETOMIDINE HYDROCHLORIDE 0.9 MCG/KG/HR: 400 INJECTION, SOLUTION INTRAVENOUS at 00:38

## 2023-01-01 RX ADMIN — ALBUMIN (HUMAN) 12.5 G: 12.5 INJECTION, SOLUTION INTRAVENOUS at 03:31

## 2023-01-01 RX ADMIN — PANTOPRAZOLE SODIUM 40 MG: 40 INJECTION, POWDER, FOR SOLUTION INTRAVENOUS at 09:12

## 2023-01-01 RX ADMIN — MEROPENEM 1000 MG: 1 INJECTION, POWDER, FOR SOLUTION INTRAVENOUS at 00:00

## 2023-01-01 RX ADMIN — AMIODARONE HYDROCHLORIDE 0.5 MG/MIN: 50 INJECTION, SOLUTION INTRAVENOUS at 15:48

## 2023-01-01 RX ADMIN — HYDROMORPHONE HYDROCHLORIDE 1 MG: 1 INJECTION, SOLUTION INTRAMUSCULAR; INTRAVENOUS; SUBCUTANEOUS at 16:32

## 2023-01-01 RX ADMIN — PIPERACILLIN AND TAZOBACTAM 3375 MG: 3; .375 INJECTION, POWDER, LYOPHILIZED, FOR SOLUTION INTRAVENOUS at 06:09

## 2023-01-01 RX ADMIN — DEXMEDETOMIDINE HYDROCHLORIDE 1.5 MCG/KG/HR: 400 INJECTION, SOLUTION INTRAVENOUS at 16:33

## 2023-01-01 RX ADMIN — WHITE PETROLATUM 57.7 %-MINERAL OIL 31.9 % EYE OINTMENT: at 20:43

## 2023-01-01 RX ADMIN — PROPOFOL 30 MCG/KG/MIN: 10 INJECTION, EMULSION INTRAVENOUS at 00:00

## 2023-01-01 RX ADMIN — DEXMEDETOMIDINE HYDROCHLORIDE 1.5 MCG/KG/HR: 400 INJECTION, SOLUTION INTRAVENOUS at 04:30

## 2023-01-01 RX ADMIN — DEXMEDETOMIDINE HYDROCHLORIDE 1.5 MCG/KG/HR: 400 INJECTION, SOLUTION INTRAVENOUS at 04:57

## 2023-01-01 RX ADMIN — DEXMEDETOMIDINE HYDROCHLORIDE 1.5 MCG/KG/HR: 400 INJECTION, SOLUTION INTRAVENOUS at 22:00

## 2023-01-01 RX ADMIN — FUROSEMIDE 20 MG: 10 INJECTION, SOLUTION INTRAMUSCULAR; INTRAVENOUS at 10:50

## 2023-01-01 RX ADMIN — HYDROMORPHONE HYDROCHLORIDE 2 MG: 2 TABLET ORAL at 11:16

## 2023-01-01 RX ADMIN — DEXMEDETOMIDINE HYDROCHLORIDE 1.2 MCG/KG/HR: 400 INJECTION, SOLUTION INTRAVENOUS at 00:45

## 2023-01-01 RX ADMIN — SODIUM CHLORIDE, PRESERVATIVE FREE 10 ML: 5 INJECTION INTRAVENOUS at 20:20

## 2023-01-01 RX ADMIN — NICARDIPINE HYDROCHLORIDE 2.5 MG/HR: 25 INJECTION, SOLUTION INTRAVENOUS at 09:34

## 2023-01-01 RX ADMIN — QUETIAPINE FUMARATE 50 MG: 25 TABLET ORAL at 09:28

## 2023-01-01 RX ADMIN — DOBUTAMINE HYDROCHLORIDE 2.5 MCG/KG/MIN: 200 INJECTION INTRAVENOUS at 15:47

## 2023-01-01 RX ADMIN — VASOPRESSIN 0.04 UNITS/MIN: 20 INJECTION INTRAVENOUS at 05:00

## 2023-01-01 RX ADMIN — BUMETANIDE 1 MG: 0.25 INJECTION, SOLUTION INTRAMUSCULAR; INTRAVENOUS at 15:36

## 2023-01-01 RX ADMIN — ALBUMIN (HUMAN) 12.5 G: 12.5 INJECTION, SOLUTION INTRAVENOUS at 21:50

## 2023-01-01 RX ADMIN — IPRATROPIUM BROMIDE AND ALBUTEROL SULFATE 1 DOSE: 2.5; .5 SOLUTION RESPIRATORY (INHALATION) at 15:21

## 2023-01-01 RX ADMIN — AMIODARONE HYDROCHLORIDE 400 MG: 200 TABLET ORAL at 20:08

## 2023-01-01 RX ADMIN — CLONAZEPAM 1 MG: 0.5 TABLET ORAL at 17:03

## 2023-01-01 RX ADMIN — NYSTATIN 500000 UNITS: 100000 SUSPENSION ORAL at 20:08

## 2023-01-01 RX ADMIN — CHLORHEXIDINE GLUCONATE 15 ML: 1.2 RINSE ORAL at 08:30

## 2023-01-01 RX ADMIN — PROPOFOL 20 MCG/KG/MIN: 10 INJECTION, EMULSION INTRAVENOUS at 10:52

## 2023-01-01 RX ADMIN — SODIUM CHLORIDE, PRESERVATIVE FREE 10 ML: 5 INJECTION INTRAVENOUS at 10:25

## 2023-01-01 RX ADMIN — ALBUMIN (HUMAN) 12.5 G: 12.5 INJECTION, SOLUTION INTRAVENOUS at 23:19

## 2023-01-01 RX ADMIN — NYSTATIN 500000 UNITS: 100000 SUSPENSION ORAL at 20:41

## 2023-01-01 RX ADMIN — PIPERACILLIN AND TAZOBACTAM 3375 MG: 3; .375 INJECTION, POWDER, LYOPHILIZED, FOR SOLUTION INTRAVENOUS at 13:15

## 2023-01-01 RX ADMIN — ROCURONIUM BROMIDE 100 MG: 10 INJECTION, SOLUTION INTRAVENOUS at 10:45

## 2023-01-01 RX ADMIN — HYDROMORPHONE HYDROCHLORIDE 1 MG: 1 INJECTION, SOLUTION INTRAMUSCULAR; INTRAVENOUS; SUBCUTANEOUS at 05:35

## 2023-01-01 RX ADMIN — Medication 100 MCG/HR: at 23:48

## 2023-01-01 RX ADMIN — BUMETANIDE 1 MG: 0.25 INJECTION, SOLUTION INTRAMUSCULAR; INTRAVENOUS at 09:28

## 2023-01-01 RX ADMIN — VANCOMYCIN HYDROCHLORIDE 750 MG: 750 INJECTION, POWDER, LYOPHILIZED, FOR SOLUTION INTRAVENOUS at 21:21

## 2023-01-01 RX ADMIN — PROPOFOL 45 MCG/KG/MIN: 10 INJECTION, EMULSION INTRAVENOUS at 09:46

## 2023-01-01 RX ADMIN — MEROPENEM 1000 MG: 1 INJECTION, POWDER, FOR SOLUTION INTRAVENOUS at 16:11

## 2023-01-01 RX ADMIN — Medication: at 08:57

## 2023-01-01 RX ADMIN — CHLORHEXIDINE GLUCONATE 15 ML: 1.2 RINSE ORAL at 09:07

## 2023-01-01 RX ADMIN — IPRATROPIUM BROMIDE AND ALBUTEROL SULFATE 1 DOSE: 2.5; .5 SOLUTION RESPIRATORY (INHALATION) at 07:52

## 2023-01-01 RX ADMIN — IPRATROPIUM BROMIDE AND ALBUTEROL SULFATE 1 DOSE: 2.5; .5 SOLUTION RESPIRATORY (INHALATION) at 08:13

## 2023-01-01 RX ADMIN — DEXMEDETOMIDINE HYDROCHLORIDE 1.5 MCG/KG/HR: 400 INJECTION, SOLUTION INTRAVENOUS at 02:44

## 2023-01-01 RX ADMIN — DEXMEDETOMIDINE HYDROCHLORIDE 1.5 MCG/KG/HR: 400 INJECTION, SOLUTION INTRAVENOUS at 12:18

## 2023-01-01 RX ADMIN — VANCOMYCIN HYDROCHLORIDE 2250 MG: 10 INJECTION, POWDER, LYOPHILIZED, FOR SOLUTION INTRAVENOUS at 18:52

## 2023-01-01 RX ADMIN — PROPOFOL 30 MCG/KG/MIN: 10 INJECTION, EMULSION INTRAVENOUS at 21:00

## 2023-01-01 RX ADMIN — DEXMEDETOMIDINE HYDROCHLORIDE 1.5 MCG/KG/HR: 400 INJECTION, SOLUTION INTRAVENOUS at 17:16

## 2023-01-01 RX ADMIN — MEROPENEM 1000 MG: 1 INJECTION, POWDER, FOR SOLUTION INTRAVENOUS at 19:11

## 2023-01-01 RX ADMIN — ESMOLOL HYDROCHLORIDE 250 MCG/KG/MIN: 10 INJECTION INTRAVENOUS at 03:49

## 2023-01-01 RX ADMIN — BIVALIRUDIN 0.52 MG/KG/HR: 250 INJECTION, POWDER, LYOPHILIZED, FOR SOLUTION INTRAVENOUS at 21:32

## 2023-01-01 RX ADMIN — NYSTATIN 500000 UNITS: 100000 SUSPENSION ORAL at 09:05

## 2023-01-01 RX ADMIN — BUDESONIDE 500 MCG: 0.5 INHALANT RESPIRATORY (INHALATION) at 20:05

## 2023-01-01 RX ADMIN — DEXMEDETOMIDINE HYDROCHLORIDE 0.5 MCG/KG/HR: 400 INJECTION, SOLUTION INTRAVENOUS at 14:48

## 2023-01-01 RX ADMIN — IPRATROPIUM BROMIDE AND ALBUTEROL SULFATE 1 DOSE: 2.5; .5 SOLUTION RESPIRATORY (INHALATION) at 11:15

## 2023-01-01 RX ADMIN — BUDESONIDE 500 MCG: 0.5 INHALANT RESPIRATORY (INHALATION) at 19:20

## 2023-01-01 RX ADMIN — MORPHINE SULFATE 6 MG: 4 INJECTION, SOLUTION INTRAMUSCULAR; INTRAVENOUS at 20:59

## 2023-01-01 RX ADMIN — DEXMEDETOMIDINE HYDROCHLORIDE 1.5 MCG/KG/HR: 400 INJECTION, SOLUTION INTRAVENOUS at 15:53

## 2023-01-01 RX ADMIN — BIVALIRUDIN 0.52 MG/KG/HR: 250 INJECTION, POWDER, LYOPHILIZED, FOR SOLUTION INTRAVENOUS at 18:31

## 2023-01-01 RX ADMIN — DEXMEDETOMIDINE HYDROCHLORIDE 1.5 MCG/KG/HR: 400 INJECTION, SOLUTION INTRAVENOUS at 02:00

## 2023-01-01 RX ADMIN — INSULIN GLARGINE 10 UNITS: 100 INJECTION, SOLUTION SUBCUTANEOUS at 21:56

## 2023-01-01 RX ADMIN — DEXMEDETOMIDINE HYDROCHLORIDE 1.5 MCG/KG/HR: 400 INJECTION, SOLUTION INTRAVENOUS at 18:42

## 2023-01-01 RX ADMIN — Medication 150 MCG/HR: at 16:50

## 2023-01-01 RX ADMIN — NICARDIPINE HYDROCHLORIDE 5 MG/HR: 25 INJECTION, SOLUTION INTRAVENOUS at 07:25

## 2023-01-01 RX ADMIN — AMIODARONE HYDROCHLORIDE 400 MG: 200 TABLET ORAL at 20:59

## 2023-01-01 RX ADMIN — DEXMEDETOMIDINE HYDROCHLORIDE 0.9 MCG/KG/HR: 400 INJECTION, SOLUTION INTRAVENOUS at 11:22

## 2023-01-01 RX ADMIN — HYDROMORPHONE HYDROCHLORIDE 1 MG: 1 INJECTION, SOLUTION INTRAMUSCULAR; INTRAVENOUS; SUBCUTANEOUS at 17:04

## 2023-01-01 RX ADMIN — VASOPRESSIN 0.04 UNITS/MIN: 20 INJECTION INTRAVENOUS at 21:46

## 2023-01-01 RX ADMIN — MIDAZOLAM 0.5 MG: 1 INJECTION INTRAMUSCULAR; INTRAVENOUS at 17:00

## 2023-01-01 RX ADMIN — Medication 16 UNITS: at 11:37

## 2023-01-01 RX ADMIN — AMIODARONE HYDROCHLORIDE 400 MG: 200 TABLET ORAL at 20:42

## 2023-01-01 RX ADMIN — PANTOPRAZOLE SODIUM 40 MG: 40 INJECTION, POWDER, FOR SOLUTION INTRAVENOUS at 08:19

## 2023-01-01 RX ADMIN — BUMETANIDE 1 MG: 0.25 INJECTION INTRAMUSCULAR; INTRAVENOUS at 08:49

## 2023-01-01 RX ADMIN — SODIUM CHLORIDE, PRESERVATIVE FREE 10 ML: 5 INJECTION INTRAVENOUS at 08:45

## 2023-01-01 RX ADMIN — Medication 16 UNITS: at 00:22

## 2023-01-01 RX ADMIN — HYDROMORPHONE HYDROCHLORIDE 1.8 MG/HR: 10 INJECTION, SOLUTION INTRAMUSCULAR; INTRAVENOUS; SUBCUTANEOUS at 14:41

## 2023-01-01 RX ADMIN — HYDROMORPHONE HYDROCHLORIDE 1 MG: 1 INJECTION, SOLUTION INTRAMUSCULAR; INTRAVENOUS; SUBCUTANEOUS at 00:48

## 2023-01-01 RX ADMIN — HYDROMORPHONE HYDROCHLORIDE 1 MG: 2 TABLET ORAL at 15:22

## 2023-01-01 RX ADMIN — PROPOFOL 35 MCG/KG/MIN: 10 INJECTION, EMULSION INTRAVENOUS at 08:31

## 2023-01-01 RX ADMIN — DEXMEDETOMIDINE HYDROCHLORIDE 1.5 MCG/KG/HR: 400 INJECTION, SOLUTION INTRAVENOUS at 08:28

## 2023-01-01 RX ADMIN — IPRATROPIUM BROMIDE AND ALBUTEROL SULFATE 1 DOSE: 2.5; .5 SOLUTION RESPIRATORY (INHALATION) at 19:48

## 2023-01-01 RX ADMIN — BUMETANIDE 1 MG: 0.25 INJECTION INTRAMUSCULAR; INTRAVENOUS at 14:47

## 2023-01-01 RX ADMIN — CALCIUM CHLORIDE 1000 MG: 100 INJECTION INTRAVENOUS; INTRAVENTRICULAR at 09:10

## 2023-01-01 RX ADMIN — HYDROMORPHONE HYDROCHLORIDE 0.5 MG: 1 INJECTION, SOLUTION INTRAMUSCULAR; INTRAVENOUS; SUBCUTANEOUS at 20:15

## 2023-01-01 RX ADMIN — CHLORHEXIDINE GLUCONATE 15 ML: 1.2 RINSE ORAL at 09:21

## 2023-01-01 RX ADMIN — Medication: at 08:20

## 2023-01-01 RX ADMIN — POTASSIUM CHLORIDE 20 MEQ: 400 INJECTION, SOLUTION INTRAVENOUS at 00:23

## 2023-01-01 RX ADMIN — NICARDIPINE HYDROCHLORIDE 5 MG/HR: 25 INJECTION, SOLUTION INTRAVENOUS at 21:09

## 2023-01-01 RX ADMIN — CHLORHEXIDINE GLUCONATE 15 ML: 1.2 RINSE ORAL at 09:55

## 2023-01-01 RX ADMIN — FUROSEMIDE 20 MG: 10 INJECTION, SOLUTION INTRAMUSCULAR; INTRAVENOUS at 20:40

## 2023-01-01 RX ADMIN — BIVALIRUDIN 0.52 MG/KG/HR: 250 INJECTION, POWDER, LYOPHILIZED, FOR SOLUTION INTRAVENOUS at 09:44

## 2023-01-01 RX ADMIN — POTASSIUM CHLORIDE 20 MEQ: 400 INJECTION, SOLUTION INTRAVENOUS at 18:46

## 2023-01-01 RX ADMIN — CHLORHEXIDINE GLUCONATE 15 ML: 1.2 RINSE ORAL at 08:20

## 2023-01-01 RX ADMIN — Medication: at 21:21

## 2023-01-01 RX ADMIN — SODIUM CHLORIDE, PRESERVATIVE FREE 10 ML: 5 INJECTION INTRAVENOUS at 20:59

## 2023-01-01 RX ADMIN — VANCOMYCIN HYDROCHLORIDE 750 MG: 750 INJECTION, POWDER, LYOPHILIZED, FOR SOLUTION INTRAVENOUS at 12:03

## 2023-01-01 RX ADMIN — DEXMEDETOMIDINE HYDROCHLORIDE 1.5 MCG/KG/HR: 400 INJECTION, SOLUTION INTRAVENOUS at 15:27

## 2023-01-01 RX ADMIN — CLONAZEPAM 1 MG: 0.5 TABLET ORAL at 01:52

## 2023-01-01 RX ADMIN — POTASSIUM CHLORIDE 20 MEQ: 400 INJECTION, SOLUTION INTRAVENOUS at 07:24

## 2023-01-01 RX ADMIN — PANTOPRAZOLE SODIUM 40 MG: 40 INJECTION, POWDER, FOR SOLUTION INTRAVENOUS at 08:55

## 2023-01-01 RX ADMIN — VANCOMYCIN HYDROCHLORIDE 750 MG: 750 INJECTION, POWDER, LYOPHILIZED, FOR SOLUTION INTRAVENOUS at 23:20

## 2023-01-01 RX ADMIN — BUMETANIDE 1 MG: 0.25 INJECTION INTRAMUSCULAR; INTRAVENOUS at 00:00

## 2023-01-01 RX ADMIN — INSULIN HUMAN 12 UNITS: 100 INJECTION, SUSPENSION SUBCUTANEOUS at 00:34

## 2023-01-01 RX ADMIN — HYDROMORPHONE HYDROCHLORIDE 1 MG: 1 INJECTION, SOLUTION INTRAMUSCULAR; INTRAVENOUS; SUBCUTANEOUS at 04:21

## 2023-01-01 RX ADMIN — Medication: at 09:51

## 2023-01-01 RX ADMIN — IPRATROPIUM BROMIDE AND ALBUTEROL SULFATE 1 DOSE: 2.5; .5 SOLUTION RESPIRATORY (INHALATION) at 20:18

## 2023-01-01 RX ADMIN — CLONAZEPAM 1 MG: 0.5 TABLET ORAL at 18:22

## 2023-01-01 RX ADMIN — HEPARIN SODIUM 5000 UNITS: 5000 INJECTION INTRAVENOUS; SUBCUTANEOUS at 21:57

## 2023-01-01 RX ADMIN — QUETIAPINE FUMARATE 50 MG: 25 TABLET ORAL at 21:45

## 2023-01-01 RX ADMIN — MAGNESIUM OXIDE TAB 400 MG (241.3 MG ELEMENTAL MG) 400 MG: 400 (241.3 MG) TAB at 08:55

## 2023-01-01 RX ADMIN — PROPOFOL 30 MCG/KG/MIN: 10 INJECTION, EMULSION INTRAVENOUS at 02:00

## 2023-01-01 RX ADMIN — HYDROMORPHONE HYDROCHLORIDE 0.2 MG/HR: 10 INJECTION, SOLUTION INTRAMUSCULAR; INTRAVENOUS; SUBCUTANEOUS at 11:05

## 2023-01-01 RX ADMIN — FUROSEMIDE 40 MG: 10 INJECTION, SOLUTION INTRAMUSCULAR; INTRAVENOUS at 08:19

## 2023-01-01 RX ADMIN — CHLORHEXIDINE GLUCONATE 15 ML: 1.2 RINSE ORAL at 21:05

## 2023-01-01 RX ADMIN — DEXMEDETOMIDINE HYDROCHLORIDE 1.5 MCG/KG/HR: 400 INJECTION, SOLUTION INTRAVENOUS at 05:30

## 2023-01-01 RX ADMIN — VANCOMYCIN HYDROCHLORIDE 1000 MG: 1 INJECTION, POWDER, LYOPHILIZED, FOR SOLUTION INTRAVENOUS at 09:05

## 2023-01-01 RX ADMIN — PROPOFOL 30 MCG/KG/MIN: 10 INJECTION, EMULSION INTRAVENOUS at 09:56

## 2023-01-01 RX ADMIN — Medication 16 UNITS: at 00:00

## 2023-01-01 RX ADMIN — DEXMEDETOMIDINE HYDROCHLORIDE 1.5 MCG/KG/HR: 400 INJECTION, SOLUTION INTRAVENOUS at 13:59

## 2023-01-01 RX ADMIN — NYSTATIN 500000 UNITS: 100000 SUSPENSION ORAL at 18:49

## 2023-01-01 RX ADMIN — HYDROMORPHONE HYDROCHLORIDE 2 MG: 2 TABLET ORAL at 22:04

## 2023-01-01 RX ADMIN — CHLOROTHIAZIDE SODIUM 250 MG: 500 INJECTION, POWDER, LYOPHILIZED, FOR SOLUTION INTRAVENOUS at 09:29

## 2023-01-01 RX ADMIN — PANTOPRAZOLE SODIUM 40 MG: 40 INJECTION, POWDER, FOR SOLUTION INTRAVENOUS at 08:32

## 2023-01-01 RX ADMIN — HEPARIN SODIUM 5000 UNITS: 5000 INJECTION INTRAVENOUS; SUBCUTANEOUS at 22:00

## 2023-01-01 RX ADMIN — AMIODARONE HYDROCHLORIDE 0.5 MG/MIN: 50 INJECTION, SOLUTION INTRAVENOUS at 12:39

## 2023-01-01 RX ADMIN — HYDROMORPHONE HYDROCHLORIDE 0.5 MG: 1 INJECTION, SOLUTION INTRAMUSCULAR; INTRAVENOUS; SUBCUTANEOUS at 06:57

## 2023-01-01 RX ADMIN — BIVALIRUDIN 0.52 MG/KG/HR: 250 INJECTION, POWDER, LYOPHILIZED, FOR SOLUTION INTRAVENOUS at 22:57

## 2023-01-01 RX ADMIN — FUROSEMIDE 40 MG: 10 INJECTION, SOLUTION INTRAMUSCULAR; INTRAVENOUS at 08:32

## 2023-01-01 RX ADMIN — IPRATROPIUM BROMIDE AND ALBUTEROL SULFATE 1 DOSE: 2.5; .5 SOLUTION RESPIRATORY (INHALATION) at 15:15

## 2023-01-01 RX ADMIN — HYDROMORPHONE HYDROCHLORIDE 1 MG: 1 INJECTION, SOLUTION INTRAMUSCULAR; INTRAVENOUS; SUBCUTANEOUS at 05:39

## 2023-01-01 RX ADMIN — DEXMEDETOMIDINE HYDROCHLORIDE 1.5 MCG/KG/HR: 400 INJECTION, SOLUTION INTRAVENOUS at 12:26

## 2023-01-01 RX ADMIN — MIDAZOLAM 1 MG: 1 INJECTION INTRAMUSCULAR; INTRAVENOUS at 19:36

## 2023-01-01 RX ADMIN — Medication: at 09:39

## 2023-01-01 RX ADMIN — DEXMEDETOMIDINE HYDROCHLORIDE 1.5 MCG/KG/HR: 400 INJECTION, SOLUTION INTRAVENOUS at 21:10

## 2023-01-01 RX ADMIN — IPRATROPIUM BROMIDE AND ALBUTEROL SULFATE 1 DOSE: 2.5; .5 SOLUTION RESPIRATORY (INHALATION) at 07:36

## 2023-01-01 RX ADMIN — POTASSIUM CHLORIDE 20 MEQ: 400 INJECTION, SOLUTION INTRAVENOUS at 06:31

## 2023-01-01 RX ADMIN — Medication: at 20:30

## 2023-01-01 RX ADMIN — NYSTATIN 500000 UNITS: 100000 SUSPENSION ORAL at 08:32

## 2023-01-01 RX ADMIN — AMIODARONE HYDROCHLORIDE 400 MG: 200 TABLET ORAL at 20:55

## 2023-01-01 RX ADMIN — VASOPRESSIN 0.04 UNITS/MIN: 20 INJECTION INTRAVENOUS at 11:45

## 2023-01-01 RX ADMIN — POTASSIUM CHLORIDE 20 MEQ: 400 INJECTION, SOLUTION INTRAVENOUS at 18:56

## 2023-01-01 RX ADMIN — BUDESONIDE 500 MCG: 0.5 INHALANT RESPIRATORY (INHALATION) at 19:35

## 2023-01-01 RX ADMIN — SODIUM CHLORIDE, PRESERVATIVE FREE 10 ML: 5 INJECTION INTRAVENOUS at 08:22

## 2023-01-01 RX ADMIN — MIDAZOLAM 1 MG: 1 INJECTION INTRAMUSCULAR; INTRAVENOUS at 06:24

## 2023-01-01 RX ADMIN — MEROPENEM 1000 MG: 1 INJECTION, POWDER, FOR SOLUTION INTRAVENOUS at 07:30

## 2023-01-01 RX ADMIN — HYDROMORPHONE HYDROCHLORIDE 0.5 MG: 1 INJECTION, SOLUTION INTRAMUSCULAR; INTRAVENOUS; SUBCUTANEOUS at 05:33

## 2023-01-01 RX ADMIN — AMIODARONE HYDROCHLORIDE 0.5 MG/MIN: 50 INJECTION, SOLUTION INTRAVENOUS at 21:00

## 2023-01-01 RX ADMIN — DEXMEDETOMIDINE HYDROCHLORIDE 1 MCG/KG/HR: 400 INJECTION, SOLUTION INTRAVENOUS at 17:01

## 2023-01-01 RX ADMIN — IOPAMIDOL 100 ML: 755 INJECTION, SOLUTION INTRAVENOUS at 12:12

## 2023-01-01 RX ADMIN — HYDROMORPHONE HYDROCHLORIDE 1 MG: 1 INJECTION, SOLUTION INTRAMUSCULAR; INTRAVENOUS; SUBCUTANEOUS at 21:41

## 2023-01-01 RX ADMIN — PHENYLEPHRINE HYDROCHLORIDE 80 MCG/MIN: 10 INJECTION INTRAVENOUS at 00:20

## 2023-01-01 RX ADMIN — DEXMEDETOMIDINE HYDROCHLORIDE 1.5 MCG/KG/HR: 400 INJECTION, SOLUTION INTRAVENOUS at 15:04

## 2023-01-01 RX ADMIN — MUPIROCIN: 20 OINTMENT TOPICAL at 09:39

## 2023-01-01 RX ADMIN — QUETIAPINE FUMARATE 50 MG: 25 TABLET ORAL at 21:03

## 2023-01-01 RX ADMIN — POTASSIUM CHLORIDE 20 MEQ: 400 INJECTION, SOLUTION INTRAVENOUS at 06:30

## 2023-01-01 RX ADMIN — PIPERACILLIN AND TAZOBACTAM 3375 MG: 3; .375 INJECTION, POWDER, LYOPHILIZED, FOR SOLUTION INTRAVENOUS at 06:00

## 2023-01-01 RX ADMIN — HYDROMORPHONE HYDROCHLORIDE 2 MG: 2 TABLET ORAL at 20:40

## 2023-01-01 RX ADMIN — Medication 16 UNITS: at 11:45

## 2023-01-01 RX ADMIN — BUMETANIDE 1 MG: 0.25 INJECTION, SOLUTION INTRAMUSCULAR; INTRAVENOUS at 00:22

## 2023-01-01 RX ADMIN — IPRATROPIUM BROMIDE AND ALBUTEROL SULFATE 1 DOSE: 2.5; .5 SOLUTION RESPIRATORY (INHALATION) at 16:27

## 2023-01-01 RX ADMIN — CLONAZEPAM 1 MG: 0.5 TABLET ORAL at 09:00

## 2023-01-01 RX ADMIN — HYDROMORPHONE HYDROCHLORIDE 1 MG: 1 INJECTION, SOLUTION INTRAMUSCULAR; INTRAVENOUS; SUBCUTANEOUS at 16:41

## 2023-01-01 RX ADMIN — Medication: at 20:14

## 2023-01-01 RX ADMIN — HYDROMORPHONE HYDROCHLORIDE 2 MG: 2 TABLET ORAL at 00:09

## 2023-01-01 RX ADMIN — SODIUM CHLORIDE, PRESERVATIVE FREE 10 ML: 5 INJECTION INTRAVENOUS at 10:14

## 2023-01-01 RX ADMIN — SODIUM CHLORIDE, PRESERVATIVE FREE 10 ML: 5 INJECTION INTRAVENOUS at 08:50

## 2023-01-01 RX ADMIN — MIDAZOLAM 1 MG: 1 INJECTION INTRAMUSCULAR; INTRAVENOUS at 23:24

## 2023-01-01 RX ADMIN — POTASSIUM CHLORIDE 20 MEQ: 400 INJECTION, SOLUTION INTRAVENOUS at 22:00

## 2023-01-01 RX ADMIN — VANCOMYCIN HYDROCHLORIDE 750 MG: 750 INJECTION, POWDER, LYOPHILIZED, FOR SOLUTION INTRAVENOUS at 23:44

## 2023-01-01 RX ADMIN — Medication: at 21:05

## 2023-01-01 RX ADMIN — PROPOFOL 30 MCG/KG/MIN: 10 INJECTION, EMULSION INTRAVENOUS at 05:00

## 2023-01-01 RX ADMIN — VANCOMYCIN HYDROCHLORIDE 750 MG: 750 INJECTION, POWDER, LYOPHILIZED, FOR SOLUTION INTRAVENOUS at 00:10

## 2023-01-01 RX ADMIN — MIDAZOLAM 1 MG: 1 INJECTION INTRAMUSCULAR; INTRAVENOUS at 15:26

## 2023-01-01 RX ADMIN — IPRATROPIUM BROMIDE AND ALBUTEROL SULFATE 1 DOSE: 2.5; .5 SOLUTION RESPIRATORY (INHALATION) at 21:36

## 2023-01-01 RX ADMIN — POTASSIUM BICARBONATE 40 MEQ: 782 TABLET, EFFERVESCENT ORAL at 09:05

## 2023-01-01 RX ADMIN — IPRATROPIUM BROMIDE AND ALBUTEROL SULFATE 1 DOSE: 2.5; .5 SOLUTION RESPIRATORY (INHALATION) at 21:43

## 2023-01-01 RX ADMIN — DEXMEDETOMIDINE HYDROCHLORIDE 1.5 MCG/KG/HR: 400 INJECTION, SOLUTION INTRAVENOUS at 13:53

## 2023-01-01 RX ADMIN — CEFEPIME 2000 MG: 2 INJECTION, POWDER, FOR SOLUTION INTRAVENOUS at 20:47

## 2023-01-01 RX ADMIN — IPRATROPIUM BROMIDE AND ALBUTEROL SULFATE 1 DOSE: 2.5; .5 SOLUTION RESPIRATORY (INHALATION) at 15:17

## 2023-01-01 RX ADMIN — WATER 2000 MG: 1 INJECTION INTRAMUSCULAR; INTRAVENOUS; SUBCUTANEOUS at 06:58

## 2023-01-01 RX ADMIN — AMIODARONE HYDROCHLORIDE 400 MG: 200 TABLET ORAL at 08:30

## 2023-01-01 RX ADMIN — BUDESONIDE 500 MCG: 0.5 INHALANT RESPIRATORY (INHALATION) at 19:26

## 2023-01-01 RX ADMIN — Medication: at 20:44

## 2023-01-01 RX ADMIN — IPRATROPIUM BROMIDE AND ALBUTEROL SULFATE 1 DOSE: 2.5; .5 SOLUTION RESPIRATORY (INHALATION) at 15:29

## 2023-01-01 RX ADMIN — CHLORHEXIDINE GLUCONATE 15 ML: 1.2 RINSE ORAL at 08:19

## 2023-01-01 RX ADMIN — SODIUM CHLORIDE, POTASSIUM CHLORIDE, SODIUM LACTATE AND CALCIUM CHLORIDE 1000 ML: 600; 310; 30; 20 INJECTION, SOLUTION INTRAVENOUS at 17:57

## 2023-01-01 RX ADMIN — INSULIN HUMAN 12 UNITS: 100 INJECTION, SUSPENSION SUBCUTANEOUS at 01:06

## 2023-01-01 RX ADMIN — QUETIAPINE FUMARATE 50 MG: 25 TABLET ORAL at 20:39

## 2023-01-01 RX ADMIN — MICAFUNGIN SODIUM 150 MG: 100 INJECTION, POWDER, LYOPHILIZED, FOR SOLUTION INTRAVENOUS at 10:11

## 2023-01-01 RX ADMIN — ALPRAZOLAM 0.5 MG: 0.5 TABLET ORAL at 21:00

## 2023-01-01 RX ADMIN — MEROPENEM 1000 MG: 1 INJECTION, POWDER, FOR SOLUTION INTRAVENOUS at 19:06

## 2023-01-01 RX ADMIN — VANCOMYCIN HYDROCHLORIDE 750 MG: 750 INJECTION, POWDER, LYOPHILIZED, FOR SOLUTION INTRAVENOUS at 23:13

## 2023-01-01 RX ADMIN — SODIUM CHLORIDE: 9 INJECTION, SOLUTION INTRAVENOUS at 00:45

## 2023-01-01 RX ADMIN — MIDAZOLAM 1 MG: 1 INJECTION INTRAMUSCULAR; INTRAVENOUS at 23:18

## 2023-01-01 RX ADMIN — PANTOPRAZOLE SODIUM 40 MG: 40 INJECTION, POWDER, FOR SOLUTION INTRAVENOUS at 08:18

## 2023-01-01 RX ADMIN — DEXMEDETOMIDINE HYDROCHLORIDE 1.5 MCG/KG/HR: 400 INJECTION, SOLUTION INTRAVENOUS at 08:26

## 2023-01-01 RX ADMIN — PANTOPRAZOLE SODIUM 40 MG: 40 INJECTION, POWDER, FOR SOLUTION INTRAVENOUS at 08:50

## 2023-01-01 RX ADMIN — QUETIAPINE FUMARATE 50 MG: 25 TABLET ORAL at 20:36

## 2023-01-01 RX ADMIN — MUPIROCIN: 20 OINTMENT TOPICAL at 22:41

## 2023-01-01 RX ADMIN — PROPOFOL 45 MCG/KG/MIN: 10 INJECTION, EMULSION INTRAVENOUS at 19:51

## 2023-01-01 RX ADMIN — PROPOFOL 20 MCG/KG/MIN: 10 INJECTION, EMULSION INTRAVENOUS at 18:16

## 2023-01-01 RX ADMIN — FUROSEMIDE 20 MG: 10 INJECTION, SOLUTION INTRAMUSCULAR; INTRAVENOUS at 09:37

## 2023-01-01 RX ADMIN — NOREPINEPHRINE BITARTRATE 4 MCG/MIN: 1 INJECTION, SOLUTION, CONCENTRATE INTRAVENOUS at 05:30

## 2023-01-01 RX ADMIN — SENNOSIDES AND DOCUSATE SODIUM 1 TABLET: 8.6; 5 TABLET ORAL at 08:51

## 2023-01-01 RX ADMIN — TOBRAMYCIN 300 MG: 300 SOLUTION ORAL at 20:02

## 2023-01-01 RX ADMIN — FUROSEMIDE 40 MG: 10 INJECTION, SOLUTION INTRAMUSCULAR; INTRAVENOUS at 17:39

## 2023-01-01 RX ADMIN — POTASSIUM BICARBONATE 40 MEQ: 782 TABLET, EFFERVESCENT ORAL at 16:13

## 2023-01-01 RX ADMIN — DEXMEDETOMIDINE HYDROCHLORIDE 1.5 MCG/KG/HR: 400 INJECTION, SOLUTION INTRAVENOUS at 19:49

## 2023-01-01 RX ADMIN — CLONAZEPAM 1 MG: 0.5 TABLET ORAL at 09:47

## 2023-01-01 RX ADMIN — SENNOSIDES AND DOCUSATE SODIUM 1 TABLET: 8.6; 5 TABLET ORAL at 20:47

## 2023-01-01 RX ADMIN — IPRATROPIUM BROMIDE AND ALBUTEROL SULFATE 1 DOSE: 2.5; .5 SOLUTION RESPIRATORY (INHALATION) at 14:45

## 2023-01-01 RX ADMIN — BUMETANIDE 1 MG: 0.25 INJECTION, SOLUTION INTRAMUSCULAR; INTRAVENOUS at 15:17

## 2023-01-01 RX ADMIN — DEXMEDETOMIDINE HYDROCHLORIDE 1.5 MCG/KG/HR: 400 INJECTION, SOLUTION INTRAVENOUS at 12:39

## 2023-01-01 RX ADMIN — MIDAZOLAM 1 MG: 1 INJECTION INTRAMUSCULAR; INTRAVENOUS at 00:50

## 2023-01-01 RX ADMIN — Medication 14 UNITS: at 23:53

## 2023-01-01 RX ADMIN — BUDESONIDE 500 MCG: 0.5 INHALANT RESPIRATORY (INHALATION) at 21:43

## 2023-01-01 RX ADMIN — ACETYLCYSTEINE 600 MG: 200 INHALANT RESPIRATORY (INHALATION) at 08:55

## 2023-01-01 RX ADMIN — MIDAZOLAM 1 MG: 1 INJECTION INTRAMUSCULAR; INTRAVENOUS at 10:14

## 2023-01-01 RX ADMIN — MICAFUNGIN SODIUM 150 MG: 100 INJECTION, POWDER, LYOPHILIZED, FOR SOLUTION INTRAVENOUS at 09:54

## 2023-01-01 RX ADMIN — MIDAZOLAM 1 MG: 1 INJECTION INTRAMUSCULAR; INTRAVENOUS at 20:49

## 2023-01-01 RX ADMIN — POTASSIUM CHLORIDE 20 MEQ: 400 INJECTION, SOLUTION INTRAVENOUS at 05:28

## 2023-01-01 RX ADMIN — HYDROMORPHONE HYDROCHLORIDE 3 MG/HR: 10 INJECTION, SOLUTION INTRAMUSCULAR; INTRAVENOUS; SUBCUTANEOUS at 02:45

## 2023-01-01 RX ADMIN — Medication 1 UNITS: at 00:14

## 2023-01-01 RX ADMIN — QUETIAPINE FUMARATE 50 MG: 25 TABLET ORAL at 08:17

## 2023-01-01 RX ADMIN — VASOPRESSIN 0.04 UNITS/MIN: 20 INJECTION INTRAVENOUS at 20:00

## 2023-01-01 RX ADMIN — MIDAZOLAM 1 MG: 1 INJECTION INTRAMUSCULAR; INTRAVENOUS at 05:17

## 2023-01-01 RX ADMIN — BUDESONIDE 500 MCG: 0.5 INHALANT RESPIRATORY (INHALATION) at 19:59

## 2023-01-01 RX ADMIN — Medication 6 MG: at 21:00

## 2023-01-01 RX ADMIN — NICARDIPINE HYDROCHLORIDE 5 MG/HR: 25 INJECTION, SOLUTION INTRAVENOUS at 19:46

## 2023-01-01 RX ADMIN — QUETIAPINE FUMARATE 50 MG: 25 TABLET ORAL at 08:18

## 2023-01-01 RX ADMIN — HYDROMORPHONE HYDROCHLORIDE 3 MG/HR: 10 INJECTION, SOLUTION INTRAMUSCULAR; INTRAVENOUS; SUBCUTANEOUS at 00:00

## 2023-01-01 RX ADMIN — IPRATROPIUM BROMIDE AND ALBUTEROL SULFATE 1 DOSE: 2.5; .5 SOLUTION RESPIRATORY (INHALATION) at 15:36

## 2023-01-01 RX ADMIN — ACETYLCYSTEINE 600 MG: 200 INHALANT RESPIRATORY (INHALATION) at 13:05

## 2023-01-01 RX ADMIN — HYDROMORPHONE HYDROCHLORIDE 1 MG: 1 INJECTION, SOLUTION INTRAMUSCULAR; INTRAVENOUS; SUBCUTANEOUS at 03:57

## 2023-01-01 RX ADMIN — POTASSIUM CHLORIDE 20 MEQ: 400 INJECTION, SOLUTION INTRAVENOUS at 09:33

## 2023-01-01 RX ADMIN — SODIUM CHLORIDE, PRESERVATIVE FREE 10 ML: 5 INJECTION INTRAVENOUS at 09:00

## 2023-01-01 RX ADMIN — POTASSIUM CHLORIDE 20 MEQ: 400 INJECTION, SOLUTION INTRAVENOUS at 05:30

## 2023-01-01 RX ADMIN — DEXMEDETOMIDINE HYDROCHLORIDE 1.5 MCG/KG/HR: 400 INJECTION, SOLUTION INTRAVENOUS at 09:33

## 2023-01-01 RX ADMIN — VANCOMYCIN HYDROCHLORIDE 750 MG: 750 INJECTION, POWDER, LYOPHILIZED, FOR SOLUTION INTRAVENOUS at 09:37

## 2023-01-01 RX ADMIN — MIDAZOLAM 1 MG: 1 INJECTION INTRAMUSCULAR; INTRAVENOUS at 10:49

## 2023-01-01 RX ADMIN — POTASSIUM BICARBONATE 40 MEQ: 782 TABLET, EFFERVESCENT ORAL at 00:00

## 2023-01-01 RX ADMIN — SODIUM CHLORIDE, PRESERVATIVE FREE 10 ML: 5 INJECTION INTRAVENOUS at 08:18

## 2023-01-01 RX ADMIN — BUMETANIDE 1 MG: 0.25 INJECTION INTRAMUSCULAR; INTRAVENOUS at 17:43

## 2023-01-01 RX ADMIN — ESMOLOL HYDROCHLORIDE 200 MCG/KG/MIN: 10 INJECTION INTRAVENOUS at 03:37

## 2023-01-01 RX ADMIN — IPRATROPIUM BROMIDE AND ALBUTEROL SULFATE 1 DOSE: 2.5; .5 SOLUTION RESPIRATORY (INHALATION) at 07:19

## 2023-01-01 RX ADMIN — SENNOSIDES AND DOCUSATE SODIUM 1 TABLET: 8.6; 5 TABLET ORAL at 21:12

## 2023-01-01 RX ADMIN — DEXMEDETOMIDINE HYDROCHLORIDE 1 MCG/KG/HR: 400 INJECTION, SOLUTION INTRAVENOUS at 22:48

## 2023-01-01 RX ADMIN — TOBRAMYCIN 300 MG: 300 SOLUTION ORAL at 21:36

## 2023-01-01 RX ADMIN — FENTANYL CITRATE 100 MCG: 50 INJECTION, SOLUTION INTRAMUSCULAR; INTRAVENOUS at 20:35

## 2023-01-01 RX ADMIN — Medication: at 21:01

## 2023-01-01 RX ADMIN — QUETIAPINE FUMARATE 50 MG: 25 TABLET ORAL at 20:41

## 2023-01-01 RX ADMIN — MEROPENEM 1000 MG: 1 INJECTION, POWDER, FOR SOLUTION INTRAVENOUS at 16:03

## 2023-01-01 RX ADMIN — NOREPINEPHRINE BITARTRATE 26 MCG/MIN: 1 INJECTION, SOLUTION, CONCENTRATE INTRAVENOUS at 17:11

## 2023-01-01 RX ADMIN — POTASSIUM BICARBONATE 40 MEQ: 782 TABLET, EFFERVESCENT ORAL at 08:49

## 2023-01-01 RX ADMIN — DEXMEDETOMIDINE HYDROCHLORIDE 1.5 MCG/KG/HR: 400 INJECTION, SOLUTION INTRAVENOUS at 01:48

## 2023-01-01 RX ADMIN — VASOPRESSIN 0.04 UNITS/MIN: 20 INJECTION INTRAVENOUS at 15:57

## 2023-01-01 RX ADMIN — MIDAZOLAM 1 MG: 1 INJECTION INTRAMUSCULAR; INTRAVENOUS at 11:40

## 2023-01-01 RX ADMIN — FUROSEMIDE 20 MG: 10 INJECTION, SOLUTION INTRAMUSCULAR; INTRAVENOUS at 18:01

## 2023-01-01 RX ADMIN — NICARDIPINE HYDROCHLORIDE 5 MG/HR: 25 INJECTION, SOLUTION INTRAVENOUS at 02:44

## 2023-01-01 RX ADMIN — CHLOROTHIAZIDE SODIUM 125 MG: 500 INJECTION, POWDER, LYOPHILIZED, FOR SOLUTION INTRAVENOUS at 07:23

## 2023-01-01 RX ADMIN — MIDAZOLAM 1 MG: 1 INJECTION INTRAMUSCULAR; INTRAVENOUS at 01:22

## 2023-01-01 RX ADMIN — VANCOMYCIN HYDROCHLORIDE 1000 MG: 1 INJECTION, POWDER, LYOPHILIZED, FOR SOLUTION INTRAVENOUS at 21:04

## 2023-01-01 RX ADMIN — ESMOLOL HYDROCHLORIDE 175 MCG/KG/MIN: 10 INJECTION INTRAVENOUS at 03:27

## 2023-01-01 RX ADMIN — KETAMINE HYDROCHLORIDE 0.2 MG/KG/HR: 100 INJECTION INTRAMUSCULAR; INTRAVENOUS at 16:21

## 2023-01-01 RX ADMIN — HYDROMORPHONE HYDROCHLORIDE 1 MG: 1 INJECTION, SOLUTION INTRAMUSCULAR; INTRAVENOUS; SUBCUTANEOUS at 05:04

## 2023-01-01 RX ADMIN — INSULIN LISPRO 1 UNITS: 100 INJECTION, SOLUTION INTRAVENOUS; SUBCUTANEOUS at 18:01

## 2023-01-01 RX ADMIN — AMIODARONE HYDROCHLORIDE 400 MG: 200 TABLET ORAL at 08:55

## 2023-01-01 RX ADMIN — MIDAZOLAM 1 MG: 1 INJECTION INTRAMUSCULAR; INTRAVENOUS at 04:41

## 2023-01-01 RX ADMIN — MIDAZOLAM 1 MG: 1 INJECTION INTRAMUSCULAR; INTRAVENOUS at 02:05

## 2023-01-01 RX ADMIN — PROPOFOL 30 MCG/KG/MIN: 10 INJECTION, EMULSION INTRAVENOUS at 16:14

## 2023-01-01 RX ADMIN — CHLORHEXIDINE GLUCONATE 15 ML: 1.2 RINSE ORAL at 20:44

## 2023-01-01 RX ADMIN — MICAFUNGIN SODIUM 150 MG: 100 INJECTION, POWDER, LYOPHILIZED, FOR SOLUTION INTRAVENOUS at 10:12

## 2023-01-01 RX ADMIN — INSULIN HUMAN 12 UNITS: 100 INJECTION, SUSPENSION SUBCUTANEOUS at 11:25

## 2023-01-01 RX ADMIN — IPRATROPIUM BROMIDE AND ALBUTEROL SULFATE 1 DOSE: 2.5; .5 SOLUTION RESPIRATORY (INHALATION) at 08:18

## 2023-01-01 RX ADMIN — NICARDIPINE HYDROCHLORIDE 7.5 MG/HR: 25 INJECTION, SOLUTION INTRAVENOUS at 18:20

## 2023-01-01 RX ADMIN — POLYETHYLENE GLYCOL 3350 17 G: 17 POWDER, FOR SOLUTION ORAL at 09:38

## 2023-01-01 RX ADMIN — MICAFUNGIN SODIUM 150 MG: 100 INJECTION, POWDER, LYOPHILIZED, FOR SOLUTION INTRAVENOUS at 09:30

## 2023-01-01 RX ADMIN — INSULIN HUMAN 12 UNITS: 100 INJECTION, SUSPENSION SUBCUTANEOUS at 00:26

## 2023-01-01 RX ADMIN — DIPHENHYDRAMINE HYDROCHLORIDE 25 MG: 25 CAPSULE ORAL at 00:43

## 2023-01-01 RX ADMIN — BUDESONIDE 500 MCG: 0.5 INHALANT RESPIRATORY (INHALATION) at 20:08

## 2023-01-01 RX ADMIN — POTASSIUM BICARBONATE 40 MEQ: 782 TABLET, EFFERVESCENT ORAL at 09:02

## 2023-01-01 RX ADMIN — BUMETANIDE 1 MG: 0.25 INJECTION INTRAMUSCULAR; INTRAVENOUS at 16:59

## 2023-01-01 RX ADMIN — IPRATROPIUM BROMIDE AND ALBUTEROL SULFATE 1 DOSE: 2.5; .5 SOLUTION RESPIRATORY (INHALATION) at 12:37

## 2023-01-01 RX ADMIN — BIVALIRUDIN 0.52 MG/KG/HR: 250 INJECTION, POWDER, LYOPHILIZED, FOR SOLUTION INTRAVENOUS at 14:22

## 2023-01-01 RX ADMIN — BUDESONIDE 500 MCG: 0.5 INHALANT RESPIRATORY (INHALATION) at 19:40

## 2023-01-01 RX ADMIN — AMIODARONE HYDROCHLORIDE 400 MG: 200 TABLET ORAL at 08:17

## 2023-01-01 RX ADMIN — NYSTATIN 500000 UNITS: 100000 SUSPENSION ORAL at 17:03

## 2023-01-01 RX ADMIN — SODIUM CHLORIDE, PRESERVATIVE FREE 10 ML: 5 INJECTION INTRAVENOUS at 07:57

## 2023-01-01 RX ADMIN — MORPHINE SULFATE 2 MG: 2 INJECTION, SOLUTION INTRAMUSCULAR; INTRAVENOUS at 08:27

## 2023-01-01 RX ADMIN — MIDAZOLAM 1 MG: 1 INJECTION INTRAMUSCULAR; INTRAVENOUS at 18:56

## 2023-01-01 RX ADMIN — Medication 16 UNITS: at 12:22

## 2023-01-01 RX ADMIN — ACETYLCYSTEINE 600 MG: 200 INHALANT RESPIRATORY (INHALATION) at 16:30

## 2023-01-01 RX ADMIN — PROPOFOL 50 MCG/KG/MIN: 10 INJECTION, EMULSION INTRAVENOUS at 01:58

## 2023-01-01 RX ADMIN — POTASSIUM BICARBONATE 40 MEQ: 782 TABLET, EFFERVESCENT ORAL at 20:59

## 2023-01-01 RX ADMIN — VANCOMYCIN HYDROCHLORIDE 1000 MG: 1 INJECTION, POWDER, LYOPHILIZED, FOR SOLUTION INTRAVENOUS at 11:31

## 2023-01-01 RX ADMIN — MAGNESIUM OXIDE TAB 400 MG (241.3 MG ELEMENTAL MG) 400 MG: 400 (241.3 MG) TAB at 09:38

## 2023-01-01 RX ADMIN — MAGNESIUM HYDROXIDE 30 ML: 400 SUSPENSION ORAL at 13:35

## 2023-01-01 RX ADMIN — POTASSIUM CHLORIDE 20 MEQ: 400 INJECTION, SOLUTION INTRAVENOUS at 07:00

## 2023-01-01 RX ADMIN — MICAFUNGIN SODIUM 150 MG: 100 INJECTION, POWDER, LYOPHILIZED, FOR SOLUTION INTRAVENOUS at 10:30

## 2023-01-01 RX ADMIN — MIDAZOLAM 1 MG: 1 INJECTION INTRAMUSCULAR; INTRAVENOUS at 23:45

## 2023-01-01 RX ADMIN — DEXMEDETOMIDINE HYDROCHLORIDE 1.5 MCG/KG/HR: 400 INJECTION, SOLUTION INTRAVENOUS at 09:17

## 2023-01-01 RX ADMIN — DEXMEDETOMIDINE HYDROCHLORIDE 1.5 MCG/KG/HR: 400 INJECTION, SOLUTION INTRAVENOUS at 09:01

## 2023-01-01 RX ADMIN — SENNOSIDES AND DOCUSATE SODIUM 1 TABLET: 8.6; 5 TABLET ORAL at 20:55

## 2023-01-01 RX ADMIN — Medication 200 MCG/HR: at 01:53

## 2023-01-01 RX ADMIN — AMIODARONE HYDROCHLORIDE 1 MG/MIN: 50 INJECTION, SOLUTION INTRAVENOUS at 21:24

## 2023-01-01 RX ADMIN — BUDESONIDE 500 MCG: 0.5 INHALANT RESPIRATORY (INHALATION) at 19:48

## 2023-01-01 RX ADMIN — ALBUMIN (HUMAN) 25 G: 0.25 INJECTION, SOLUTION INTRAVENOUS at 10:10

## 2023-01-01 RX ADMIN — MIDAZOLAM 1 MG: 1 INJECTION INTRAMUSCULAR; INTRAVENOUS at 05:09

## 2023-01-01 RX ADMIN — IPRATROPIUM BROMIDE AND ALBUTEROL SULFATE 1 DOSE: 2.5; .5 SOLUTION RESPIRATORY (INHALATION) at 19:40

## 2023-01-01 RX ADMIN — PROPOFOL 45 MCG/KG/MIN: 10 INJECTION, EMULSION INTRAVENOUS at 16:10

## 2023-01-01 RX ADMIN — CHLORHEXIDINE GLUCONATE 15 ML: 1.2 RINSE ORAL at 10:41

## 2023-01-01 RX ADMIN — Medication 6 MG: at 03:46

## 2023-01-01 RX ADMIN — AMIODARONE HYDROCHLORIDE 400 MG: 200 TABLET ORAL at 08:32

## 2023-01-01 RX ADMIN — MEROPENEM 1000 MG: 1 INJECTION, POWDER, FOR SOLUTION INTRAVENOUS at 00:18

## 2023-01-01 RX ADMIN — IPRATROPIUM BROMIDE AND ALBUTEROL SULFATE 1 DOSE: 2.5; .5 SOLUTION RESPIRATORY (INHALATION) at 19:35

## 2023-01-01 RX ADMIN — BIVALIRUDIN 0.78 MG/KG/HR: 250 INJECTION, POWDER, LYOPHILIZED, FOR SOLUTION INTRAVENOUS at 05:16

## 2023-01-01 RX ADMIN — ACETAMINOPHEN 975 MG: 325 TABLET ORAL at 12:18

## 2023-01-01 RX ADMIN — NICARDIPINE HYDROCHLORIDE 1 MG/HR: 25 INJECTION, SOLUTION INTRAVENOUS at 21:08

## 2023-01-01 RX ADMIN — VANCOMYCIN HYDROCHLORIDE 1000 MG: 1 INJECTION, POWDER, LYOPHILIZED, FOR SOLUTION INTRAVENOUS at 08:41

## 2023-01-01 RX ADMIN — POTASSIUM BICARBONATE 40 MEQ: 782 TABLET, EFFERVESCENT ORAL at 08:18

## 2023-01-01 RX ADMIN — BUDESONIDE 500 MCG: 0.5 INHALANT RESPIRATORY (INHALATION) at 08:11

## 2023-01-01 RX ADMIN — MIDAZOLAM 1 MG: 1 INJECTION INTRAMUSCULAR; INTRAVENOUS at 01:34

## 2023-01-01 RX ADMIN — BIVALIRUDIN 0.78 MG/KG/HR: 250 INJECTION, POWDER, LYOPHILIZED, FOR SOLUTION INTRAVENOUS at 12:10

## 2023-01-01 RX ADMIN — Medication: at 08:34

## 2023-01-01 RX ADMIN — PANTOPRAZOLE SODIUM 40 MG: 40 INJECTION, POWDER, FOR SOLUTION INTRAVENOUS at 08:33

## 2023-01-01 RX ADMIN — HEPARIN SODIUM 5000 UNITS: 5000 INJECTION INTRAVENOUS; SUBCUTANEOUS at 13:32

## 2023-01-01 RX ADMIN — POTASSIUM BICARBONATE 40 MEQ: 782 TABLET, EFFERVESCENT ORAL at 15:59

## 2023-01-01 RX ADMIN — BUDESONIDE 500 MCG: 0.5 INHALANT RESPIRATORY (INHALATION) at 07:58

## 2023-01-01 RX ADMIN — SODIUM CHLORIDE, PRESERVATIVE FREE 10 ML: 5 INJECTION INTRAVENOUS at 20:40

## 2023-01-01 RX ADMIN — MUPIROCIN: 20 OINTMENT TOPICAL at 20:48

## 2023-01-01 RX ADMIN — MIDAZOLAM 1 MG: 1 INJECTION INTRAMUSCULAR; INTRAVENOUS at 09:58

## 2023-01-01 RX ADMIN — DEXMEDETOMIDINE HYDROCHLORIDE 1.5 MCG/KG/HR: 400 INJECTION, SOLUTION INTRAVENOUS at 23:08

## 2023-01-01 RX ADMIN — CHLOROTHIAZIDE SODIUM 250 MG: 500 INJECTION, POWDER, LYOPHILIZED, FOR SOLUTION INTRAVENOUS at 09:02

## 2023-01-01 RX ADMIN — MAGNESIUM OXIDE TAB 400 MG (241.3 MG ELEMENTAL MG) 400 MG: 400 (241.3 MG) TAB at 21:12

## 2023-01-01 RX ADMIN — POTASSIUM BICARBONATE 40 MEQ: 782 TABLET, EFFERVESCENT ORAL at 20:31

## 2023-01-01 RX ADMIN — BUDESONIDE 500 MCG: 0.5 INHALANT RESPIRATORY (INHALATION) at 22:36

## 2023-01-01 RX ADMIN — DEXMEDETOMIDINE HYDROCHLORIDE 1.5 MCG/KG/HR: 400 INJECTION, SOLUTION INTRAVENOUS at 07:30

## 2023-01-01 RX ADMIN — VASOPRESSIN 0.04 UNITS/MIN: 20 INJECTION INTRAVENOUS at 01:00

## 2023-01-01 RX ADMIN — BUDESONIDE 500 MCG: 0.5 INHALANT RESPIRATORY (INHALATION) at 19:22

## 2023-01-01 RX ADMIN — PROPOFOL 30 MCG/KG/MIN: 10 INJECTION, EMULSION INTRAVENOUS at 17:14

## 2023-01-01 RX ADMIN — PROPOFOL 45 MCG/KG/MIN: 10 INJECTION, EMULSION INTRAVENOUS at 21:25

## 2023-01-01 RX ADMIN — MIDAZOLAM 1 MG: 1 INJECTION INTRAMUSCULAR; INTRAVENOUS at 23:22

## 2023-01-01 RX ADMIN — CHLORHEXIDINE GLUCONATE 15 ML: 1.2 RINSE ORAL at 21:32

## 2023-01-01 RX ADMIN — CHLORHEXIDINE GLUCONATE 15 ML: 1.2 RINSE ORAL at 21:47

## 2023-01-01 RX ADMIN — NICARDIPINE HYDROCHLORIDE 7.5 MG/HR: 25 INJECTION, SOLUTION INTRAVENOUS at 11:58

## 2023-01-01 RX ADMIN — Medication 100 MCG/HR: at 05:29

## 2023-01-01 RX ADMIN — POTASSIUM BICARBONATE 40 MEQ: 782 TABLET, EFFERVESCENT ORAL at 21:50

## 2023-01-01 RX ADMIN — POTASSIUM BICARBONATE 40 MEQ: 782 TABLET, EFFERVESCENT ORAL at 20:44

## 2023-01-01 RX ADMIN — HEPARIN SODIUM 5000 UNITS: 5000 INJECTION INTRAVENOUS; SUBCUTANEOUS at 08:54

## 2023-01-01 RX ADMIN — FUROSEMIDE 20 MG: 10 INJECTION, SOLUTION INTRAMUSCULAR; INTRAVENOUS at 14:39

## 2023-01-01 RX ADMIN — PROPOFOL 45 MCG/KG/MIN: 10 INJECTION, EMULSION INTRAVENOUS at 09:47

## 2023-01-01 RX ADMIN — HYDROMORPHONE HYDROCHLORIDE 3 MG/HR: 10 INJECTION, SOLUTION INTRAMUSCULAR; INTRAVENOUS; SUBCUTANEOUS at 22:30

## 2023-01-01 RX ADMIN — VASOPRESSIN 0.04 UNITS/MIN: 20 INJECTION INTRAVENOUS at 06:32

## 2023-01-01 RX ADMIN — MIDAZOLAM 1 MG: 1 INJECTION INTRAMUSCULAR; INTRAVENOUS at 07:34

## 2023-01-01 RX ADMIN — POTASSIUM CHLORIDE 20 MEQ: 400 INJECTION, SOLUTION INTRAVENOUS at 03:17

## 2023-01-01 RX ADMIN — BIVALIRUDIN 0.52 MG/KG/HR: 250 INJECTION, POWDER, LYOPHILIZED, FOR SOLUTION INTRAVENOUS at 01:09

## 2023-01-01 RX ADMIN — NYSTATIN 500000 UNITS: 100000 SUSPENSION ORAL at 12:05

## 2023-01-01 RX ADMIN — METOLAZONE 5 MG: 5 TABLET ORAL at 09:49

## 2023-01-01 RX ADMIN — ALBUMIN (HUMAN) 12.5 G: 12.5 INJECTION, SOLUTION INTRAVENOUS at 04:51

## 2023-01-01 RX ADMIN — CALCIUM CHLORIDE INJECTION 1000 MG: 100 INJECTION, SOLUTION INTRAVENOUS at 11:06

## 2023-01-01 RX ADMIN — DEXMEDETOMIDINE HYDROCHLORIDE 1.5 MCG/KG/HR: 400 INJECTION, SOLUTION INTRAVENOUS at 21:00

## 2023-01-01 RX ADMIN — MICAFUNGIN SODIUM 150 MG: 100 INJECTION, POWDER, LYOPHILIZED, FOR SOLUTION INTRAVENOUS at 09:36

## 2023-01-01 RX ADMIN — NYSTATIN 500000 UNITS: 100000 SUSPENSION ORAL at 11:53

## 2023-01-01 RX ADMIN — DEXMEDETOMIDINE HYDROCHLORIDE 1.5 MCG/KG/HR: 400 INJECTION, SOLUTION INTRAVENOUS at 02:23

## 2023-01-01 RX ADMIN — AMIODARONE HYDROCHLORIDE 400 MG: 200 TABLET ORAL at 21:00

## 2023-01-01 RX ADMIN — POTASSIUM BICARBONATE 40 MEQ: 782 TABLET, EFFERVESCENT ORAL at 20:39

## 2023-01-01 RX ADMIN — FAMOTIDINE 20 MG: 10 INJECTION INTRAVENOUS at 09:37

## 2023-01-01 RX ADMIN — SODIUM CHLORIDE, PRESERVATIVE FREE 10 ML: 5 INJECTION INTRAVENOUS at 08:57

## 2023-01-01 RX ADMIN — BUDESONIDE 500 MCG: 0.5 INHALANT RESPIRATORY (INHALATION) at 08:22

## 2023-01-01 RX ADMIN — AMIODARONE HYDROCHLORIDE 1 MG/MIN: 50 INJECTION, SOLUTION INTRAVENOUS at 06:24

## 2023-01-01 RX ADMIN — DEXMEDETOMIDINE HYDROCHLORIDE 1.5 MCG/KG/HR: 400 INJECTION, SOLUTION INTRAVENOUS at 19:27

## 2023-01-01 RX ADMIN — IPRATROPIUM BROMIDE AND ALBUTEROL SULFATE 1 DOSE: 2.5; .5 SOLUTION RESPIRATORY (INHALATION) at 19:22

## 2023-01-01 RX ADMIN — CLONAZEPAM 1 MG: 0.5 TABLET ORAL at 01:07

## 2023-01-01 RX ADMIN — Medication: at 21:32

## 2023-01-01 RX ADMIN — PHENYLEPHRINE HYDROCHLORIDE 45 MCG/MIN: 10 INJECTION INTRAVENOUS at 08:27

## 2023-01-01 RX ADMIN — PROPOFOL 100 MCG/KG/MIN: 10 INJECTION, EMULSION INTRAVENOUS at 12:30

## 2023-01-01 RX ADMIN — PROPOFOL 50 MCG/KG/MIN: 10 INJECTION, EMULSION INTRAVENOUS at 22:20

## 2023-01-01 RX ADMIN — MIDAZOLAM 1 MG: 1 INJECTION INTRAMUSCULAR; INTRAVENOUS at 00:15

## 2023-01-01 RX ADMIN — CHLOROTHIAZIDE SODIUM 500 MG: 500 INJECTION, POWDER, LYOPHILIZED, FOR SOLUTION INTRAVENOUS at 15:45

## 2023-01-01 RX ADMIN — DEXMEDETOMIDINE HYDROCHLORIDE 1.5 MCG/KG/HR: 400 INJECTION, SOLUTION INTRAVENOUS at 08:16

## 2023-01-01 RX ADMIN — BIVALIRUDIN 0.52 MG/KG/HR: 250 INJECTION, POWDER, LYOPHILIZED, FOR SOLUTION INTRAVENOUS at 17:38

## 2023-01-01 RX ADMIN — VASOPRESSIN 0.04 UNITS/MIN: 20 INJECTION INTRAVENOUS at 18:02

## 2023-01-01 RX ADMIN — Medication 150 MCG/HR: at 20:21

## 2023-01-01 RX ADMIN — PROPOFOL 30 MCG/KG/MIN: 10 INJECTION, EMULSION INTRAVENOUS at 17:20

## 2023-01-01 RX ADMIN — QUETIAPINE FUMARATE 50 MG: 25 TABLET ORAL at 21:00

## 2023-01-01 RX ADMIN — ALBUMIN (HUMAN) 12.5 G: 12.5 INJECTION, SOLUTION INTRAVENOUS at 17:53

## 2023-01-01 RX ADMIN — DEXMEDETOMIDINE HYDROCHLORIDE 1.5 MCG/KG/HR: 400 INJECTION, SOLUTION INTRAVENOUS at 11:50

## 2023-01-01 RX ADMIN — ACETYLCYSTEINE 600 MG: 200 INHALANT RESPIRATORY (INHALATION) at 12:36

## 2023-01-01 RX ADMIN — MIDAZOLAM 1 MG: 1 INJECTION INTRAMUSCULAR; INTRAVENOUS at 09:05

## 2023-01-01 RX ADMIN — SODIUM CHLORIDE, PRESERVATIVE FREE 10 ML: 5 INJECTION INTRAVENOUS at 22:16

## 2023-01-01 RX ADMIN — ACETYLCYSTEINE 600 MG: 200 INHALANT RESPIRATORY (INHALATION) at 07:37

## 2023-01-01 RX ADMIN — POTASSIUM CHLORIDE 20 MEQ: 400 INJECTION, SOLUTION INTRAVENOUS at 06:33

## 2023-01-01 RX ADMIN — BUMETANIDE 1 MG: 0.25 INJECTION, SOLUTION INTRAMUSCULAR; INTRAVENOUS at 07:30

## 2023-01-01 RX ADMIN — PROPOFOL 35 MCG/KG/MIN: 10 INJECTION, EMULSION INTRAVENOUS at 23:36

## 2023-01-01 RX ADMIN — POTASSIUM CHLORIDE 20 MEQ: 400 INJECTION, SOLUTION INTRAVENOUS at 05:00

## 2023-01-01 RX ADMIN — PHENYLEPHRINE HYDROCHLORIDE 15 MCG/MIN: 10 INJECTION INTRAVENOUS at 10:37

## 2023-01-01 RX ADMIN — OXYCODONE HYDROCHLORIDE 10 MG: 5 TABLET ORAL at 01:07

## 2023-01-01 RX ADMIN — PROPOFOL 45 MCG/KG/MIN: 10 INJECTION, EMULSION INTRAVENOUS at 15:05

## 2023-01-01 RX ADMIN — ESMOLOL HYDROCHLORIDE 225 MCG/KG/MIN: 10 INJECTION INTRAVENOUS at 03:43

## 2023-01-01 RX ADMIN — POTASSIUM CHLORIDE 20 MEQ: 400 INJECTION, SOLUTION INTRAVENOUS at 15:33

## 2023-01-01 RX ADMIN — MIDAZOLAM 1 MG: 1 INJECTION INTRAMUSCULAR; INTRAVENOUS at 17:22

## 2023-01-01 RX ADMIN — DEXMEDETOMIDINE HYDROCHLORIDE 1.5 MCG/KG/HR: 400 INJECTION, SOLUTION INTRAVENOUS at 01:57

## 2023-01-01 RX ADMIN — AMIODARONE HYDROCHLORIDE 1 MG/MIN: 50 INJECTION, SOLUTION INTRAVENOUS at 13:12

## 2023-01-01 RX ADMIN — CEFEPIME 2000 MG: 2 INJECTION, POWDER, FOR SOLUTION INTRAVENOUS at 05:23

## 2023-01-01 RX ADMIN — Medication: at 08:18

## 2023-01-01 RX ADMIN — HEPARIN SODIUM 5000 UNITS: 5000 INJECTION INTRAVENOUS; SUBCUTANEOUS at 06:06

## 2023-01-01 RX ADMIN — MIDAZOLAM 1 MG: 1 INJECTION INTRAMUSCULAR; INTRAVENOUS at 12:27

## 2023-01-01 RX ADMIN — PROPOFOL 10 MCG/KG/MIN: 10 INJECTION, EMULSION INTRAVENOUS at 19:05

## 2023-01-01 RX ADMIN — ACETYLCYSTEINE 600 MG: 200 INHALANT RESPIRATORY (INHALATION) at 16:54

## 2023-01-01 RX ADMIN — MEROPENEM 1000 MG: 1 INJECTION, POWDER, FOR SOLUTION INTRAVENOUS at 15:34

## 2023-01-01 RX ADMIN — BISACODYL 10 MG: 10 SUPPOSITORY RECTAL at 15:59

## 2023-01-01 RX ADMIN — VASOPRESSIN 0.02 UNITS/MIN: 20 INJECTION INTRAVENOUS at 03:11

## 2023-01-01 RX ADMIN — MIDAZOLAM 2 MG: 1 INJECTION INTRAMUSCULAR; INTRAVENOUS at 06:19

## 2023-01-01 RX ADMIN — BUMETANIDE 1 MG: 0.25 INJECTION, SOLUTION INTRAMUSCULAR; INTRAVENOUS at 08:30

## 2023-01-01 RX ADMIN — AMIODARONE HYDROCHLORIDE 400 MG: 200 TABLET ORAL at 20:31

## 2023-01-01 RX ADMIN — CLONAZEPAM 1 MG: 0.5 TABLET ORAL at 18:51

## 2023-01-01 RX ADMIN — BUDESONIDE 500 MCG: 0.5 INHALANT RESPIRATORY (INHALATION) at 06:56

## 2023-01-01 RX ADMIN — BUDESONIDE 500 MCG: 0.5 INHALANT RESPIRATORY (INHALATION) at 19:58

## 2023-01-01 RX ADMIN — NICARDIPINE HYDROCHLORIDE 10 MG/HR: 25 INJECTION, SOLUTION INTRAVENOUS at 23:51

## 2023-01-01 RX ADMIN — IPRATROPIUM BROMIDE AND ALBUTEROL SULFATE 1 DOSE: 2.5; .5 SOLUTION RESPIRATORY (INHALATION) at 11:17

## 2023-01-01 RX ADMIN — PIPERACILLIN AND TAZOBACTAM 3375 MG: 3; .375 INJECTION, POWDER, LYOPHILIZED, FOR SOLUTION INTRAVENOUS at 06:01

## 2023-01-01 RX ADMIN — MIDAZOLAM 1 MG: 1 INJECTION INTRAMUSCULAR; INTRAVENOUS at 03:27

## 2023-01-01 RX ADMIN — DEXMEDETOMIDINE HYDROCHLORIDE 1.5 MCG/KG/HR: 400 INJECTION, SOLUTION INTRAVENOUS at 12:21

## 2023-01-01 RX ADMIN — FUROSEMIDE 20 MG: 10 INJECTION, SOLUTION INTRAMUSCULAR; INTRAVENOUS at 23:12

## 2023-01-01 RX ADMIN — POTASSIUM BICARBONATE 40 MEQ: 782 TABLET, EFFERVESCENT ORAL at 20:41

## 2023-01-01 RX ADMIN — LORAZEPAM 1 MG: 2 INJECTION INTRAMUSCULAR; INTRAVENOUS at 11:09

## 2023-01-01 RX ADMIN — IPRATROPIUM BROMIDE AND ALBUTEROL SULFATE 1 DOSE: 2.5; .5 SOLUTION RESPIRATORY (INHALATION) at 22:36

## 2023-01-01 RX ADMIN — PIPERACILLIN AND TAZOBACTAM 3375 MG: 3; .375 INJECTION, POWDER, LYOPHILIZED, FOR SOLUTION INTRAVENOUS at 13:42

## 2023-01-01 RX ADMIN — DEXMEDETOMIDINE HYDROCHLORIDE 1.5 MCG/KG/HR: 400 INJECTION, SOLUTION INTRAVENOUS at 09:02

## 2023-01-01 RX ADMIN — POTASSIUM CHLORIDE 20 MEQ: 400 INJECTION, SOLUTION INTRAVENOUS at 17:42

## 2023-01-01 RX ADMIN — DEXMEDETOMIDINE HYDROCHLORIDE 1.5 MCG/KG/HR: 400 INJECTION, SOLUTION INTRAVENOUS at 15:47

## 2023-01-01 RX ADMIN — PROPOFOL 25 MCG/KG/MIN: 10 INJECTION, EMULSION INTRAVENOUS at 03:42

## 2023-01-01 RX ADMIN — ESMOLOL HYDROCHLORIDE 125 MCG/KG/MIN: 10 INJECTION INTRAVENOUS at 03:17

## 2023-01-01 RX ADMIN — Medication 1 UNITS: at 21:55

## 2023-01-01 RX ADMIN — HYDROMORPHONE HYDROCHLORIDE 1 MG: 1 INJECTION, SOLUTION INTRAMUSCULAR; INTRAVENOUS; SUBCUTANEOUS at 09:12

## 2023-01-01 RX ADMIN — NYSTATIN 500000 UNITS: 100000 SUSPENSION ORAL at 15:08

## 2023-01-01 RX ADMIN — PROPOFOL 45 MCG/KG/MIN: 10 INJECTION, EMULSION INTRAVENOUS at 16:33

## 2023-01-01 RX ADMIN — MIDAZOLAM 1 MG: 1 INJECTION INTRAMUSCULAR; INTRAVENOUS at 19:25

## 2023-01-01 RX ADMIN — PROPOFOL 35 MCG/KG/MIN: 10 INJECTION, EMULSION INTRAVENOUS at 16:41

## 2023-01-01 RX ADMIN — ACETAZOLAMIDE SODIUM 250 MG: 500 INJECTION, POWDER, LYOPHILIZED, FOR SOLUTION INTRAVENOUS at 06:14

## 2023-01-01 RX ADMIN — Medication 1 UNITS: at 00:13

## 2023-01-01 RX ADMIN — MAGNESIUM OXIDE TAB 400 MG (241.3 MG ELEMENTAL MG) 400 MG: 400 (241.3 MG) TAB at 20:42

## 2023-01-01 RX ADMIN — POTASSIUM BICARBONATE 40 MEQ: 782 TABLET, EFFERVESCENT ORAL at 08:52

## 2023-01-01 RX ADMIN — QUETIAPINE FUMARATE 50 MG: 25 TABLET ORAL at 20:48

## 2023-01-01 RX ADMIN — DEXMEDETOMIDINE HYDROCHLORIDE 1.5 MCG/KG/HR: 400 INJECTION, SOLUTION INTRAVENOUS at 05:21

## 2023-01-01 RX ADMIN — POTASSIUM CHLORIDE 20 MEQ: 400 INJECTION, SOLUTION INTRAVENOUS at 01:34

## 2023-01-01 RX ADMIN — METRONIDAZOLE 500 MG: 500 INJECTION, SOLUTION INTRAVENOUS at 04:48

## 2023-01-01 RX ADMIN — IPRATROPIUM BROMIDE AND ALBUTEROL SULFATE 1 DOSE: 2.5; .5 SOLUTION RESPIRATORY (INHALATION) at 16:31

## 2023-01-01 RX ADMIN — POTASSIUM BICARBONATE 40 MEQ: 782 TABLET, EFFERVESCENT ORAL at 08:55

## 2023-01-01 RX ADMIN — MIDAZOLAM 1 MG: 1 INJECTION INTRAMUSCULAR; INTRAVENOUS at 07:00

## 2023-01-01 RX ADMIN — PIPERACILLIN AND TAZOBACTAM 3375 MG: 3; .375 INJECTION, POWDER, LYOPHILIZED, FOR SOLUTION INTRAVENOUS at 06:05

## 2023-01-01 RX ADMIN — HEPARIN SODIUM 5000 UNITS: 5000 INJECTION INTRAVENOUS; SUBCUTANEOUS at 14:08

## 2023-01-01 RX ADMIN — MIDAZOLAM 1 MG: 1 INJECTION INTRAMUSCULAR; INTRAVENOUS at 21:25

## 2023-01-01 RX ADMIN — PROPOFOL 45 MCG/KG/MIN: 10 INJECTION, EMULSION INTRAVENOUS at 11:52

## 2023-01-01 RX ADMIN — ASPIRIN 81 MG: 81 TABLET, COATED ORAL at 08:44

## 2023-01-01 RX ADMIN — NYSTATIN 500000 UNITS: 100000 SUSPENSION ORAL at 16:36

## 2023-01-01 RX ADMIN — AMIODARONE HYDROCHLORIDE 1 MG/MIN: 50 INJECTION, SOLUTION INTRAVENOUS at 00:28

## 2023-01-01 RX ADMIN — DEXMEDETOMIDINE HYDROCHLORIDE 1.5 MCG/KG/HR: 400 INJECTION, SOLUTION INTRAVENOUS at 03:50

## 2023-01-01 RX ADMIN — AMIODARONE HYDROCHLORIDE 400 MG: 200 TABLET ORAL at 08:51

## 2023-01-01 RX ADMIN — DEXMEDETOMIDINE HYDROCHLORIDE 1.5 MCG/KG/HR: 400 INJECTION, SOLUTION INTRAVENOUS at 23:00

## 2023-01-01 RX ADMIN — MIDAZOLAM 1 MG: 1 INJECTION INTRAMUSCULAR; INTRAVENOUS at 09:13

## 2023-01-01 RX ADMIN — BIVALIRUDIN 0.43 MG/KG/HR: 250 INJECTION, POWDER, LYOPHILIZED, FOR SOLUTION INTRAVENOUS at 02:10

## 2023-01-01 RX ADMIN — QUETIAPINE FUMARATE 50 MG: 25 TABLET ORAL at 08:15

## 2023-01-01 RX ADMIN — ALBUMIN (HUMAN) 12.5 G: 12.5 INJECTION, SOLUTION INTRAVENOUS at 19:24

## 2023-01-01 RX ADMIN — ALBUMIN (HUMAN) 12.5 G: 12.5 INJECTION, SOLUTION INTRAVENOUS at 17:00

## 2023-01-01 RX ADMIN — METOPROLOL TARTRATE 25 MG: 25 TABLET, FILM COATED ORAL at 08:45

## 2023-01-01 RX ADMIN — ALPRAZOLAM 0.5 MG: 0.5 TABLET ORAL at 20:58

## 2023-01-01 RX ADMIN — VASOPRESSIN 0.04 UNITS/MIN: 20 INJECTION INTRAVENOUS at 03:00

## 2023-01-01 RX ADMIN — BUMETANIDE 1 MG: 0.25 INJECTION, SOLUTION INTRAMUSCULAR; INTRAVENOUS at 00:11

## 2023-01-01 RX ADMIN — ASPIRIN 81 MG: 81 TABLET, COATED ORAL at 08:55

## 2023-01-01 RX ADMIN — PIPERACILLIN AND TAZOBACTAM 3375 MG: 3; .375 INJECTION, POWDER, LYOPHILIZED, FOR SOLUTION INTRAVENOUS at 21:40

## 2023-01-01 RX ADMIN — NYSTATIN 500000 UNITS: 100000 SUSPENSION ORAL at 08:49

## 2023-01-01 RX ADMIN — INSULIN HUMAN 15 UNITS: 100 INJECTION, SUSPENSION SUBCUTANEOUS at 14:48

## 2023-01-01 RX ADMIN — BUDESONIDE 500 MCG: 0.5 INHALANT RESPIRATORY (INHALATION) at 21:36

## 2023-01-01 RX ADMIN — MICAFUNGIN SODIUM 150 MG: 100 INJECTION, POWDER, LYOPHILIZED, FOR SOLUTION INTRAVENOUS at 09:43

## 2023-01-01 RX ADMIN — TOBRAMYCIN 300 MG: 300 SOLUTION ORAL at 08:12

## 2023-01-01 RX ADMIN — HYDROMORPHONE HYDROCHLORIDE 0.5 MG: 1 INJECTION, SOLUTION INTRAMUSCULAR; INTRAVENOUS; SUBCUTANEOUS at 09:21

## 2023-01-01 RX ADMIN — NYSTATIN 500000 UNITS: 100000 SUSPENSION ORAL at 08:30

## 2023-01-01 RX ADMIN — IPRATROPIUM BROMIDE AND ALBUTEROL SULFATE 1 DOSE: 2.5; .5 SOLUTION RESPIRATORY (INHALATION) at 07:27

## 2023-01-01 RX ADMIN — VANCOMYCIN HYDROCHLORIDE 750 MG: 750 INJECTION, POWDER, LYOPHILIZED, FOR SOLUTION INTRAVENOUS at 10:06

## 2023-01-01 RX ADMIN — TOBRAMYCIN 300 MG: 300 SOLUTION ORAL at 08:41

## 2023-01-01 RX ADMIN — IPRATROPIUM BROMIDE AND ALBUTEROL SULFATE 1 DOSE: 2.5; .5 SOLUTION RESPIRATORY (INHALATION) at 16:21

## 2023-01-01 RX ADMIN — PROPOFOL 50 MCG/KG/MIN: 10 INJECTION, EMULSION INTRAVENOUS at 06:14

## 2023-01-01 RX ADMIN — DEXMEDETOMIDINE HYDROCHLORIDE 1.5 MCG/KG/HR: 400 INJECTION, SOLUTION INTRAVENOUS at 23:02

## 2023-01-01 RX ADMIN — DEXMEDETOMIDINE HYDROCHLORIDE 1.5 MCG/KG/HR: 400 INJECTION, SOLUTION INTRAVENOUS at 19:06

## 2023-01-01 RX ADMIN — POTASSIUM BICARBONATE 40 MEQ: 782 TABLET, EFFERVESCENT ORAL at 08:44

## 2023-01-01 RX ADMIN — NOREPINEPHRINE BITARTRATE 6 MCG/MIN: 1 INJECTION, SOLUTION, CONCENTRATE INTRAVENOUS at 16:32

## 2023-01-01 RX ADMIN — VANCOMYCIN HYDROCHLORIDE 750 MG: 750 INJECTION, POWDER, LYOPHILIZED, FOR SOLUTION INTRAVENOUS at 21:00

## 2023-01-01 RX ADMIN — CHLORHEXIDINE GLUCONATE 15 ML: 1.2 RINSE ORAL at 08:15

## 2023-01-01 RX ADMIN — DEXMEDETOMIDINE HYDROCHLORIDE 1.5 MCG/KG/HR: 400 INJECTION, SOLUTION INTRAVENOUS at 01:30

## 2023-01-01 RX ADMIN — PROPOFOL 30 MCG/KG/MIN: 10 INJECTION, EMULSION INTRAVENOUS at 06:41

## 2023-01-01 RX ADMIN — BIVALIRUDIN 0.52 MG/KG/HR: 250 INJECTION, POWDER, LYOPHILIZED, FOR SOLUTION INTRAVENOUS at 17:48

## 2023-01-01 RX ADMIN — BUDESONIDE 500 MCG: 0.5 INHALANT RESPIRATORY (INHALATION) at 07:08

## 2023-01-01 RX ADMIN — VANCOMYCIN HYDROCHLORIDE 750 MG: 750 INJECTION, POWDER, LYOPHILIZED, FOR SOLUTION INTRAVENOUS at 08:32

## 2023-01-01 RX ADMIN — PROPOFOL 35 MCG/KG/MIN: 10 INJECTION, EMULSION INTRAVENOUS at 06:00

## 2023-01-01 RX ADMIN — SODIUM CHLORIDE, PRESERVATIVE FREE 10 ML: 5 INJECTION INTRAVENOUS at 11:08

## 2023-01-01 RX ADMIN — IPRATROPIUM BROMIDE AND ALBUTEROL SULFATE 1 DOSE: 2.5; .5 SOLUTION RESPIRATORY (INHALATION) at 19:20

## 2023-01-01 RX ADMIN — MORPHINE SULFATE 2 MG: 2 INJECTION, SOLUTION INTRAMUSCULAR; INTRAVENOUS at 11:07

## 2023-01-01 RX ADMIN — IPRATROPIUM BROMIDE AND ALBUTEROL SULFATE 1 DOSE: 2.5; .5 SOLUTION RESPIRATORY (INHALATION) at 08:54

## 2023-01-01 RX ADMIN — DEXMEDETOMIDINE HYDROCHLORIDE 1.5 MCG/KG/HR: 400 INJECTION, SOLUTION INTRAVENOUS at 22:01

## 2023-01-01 RX ADMIN — BIVALIRUDIN 0.7 MG/KG/HR: 250 INJECTION, POWDER, LYOPHILIZED, FOR SOLUTION INTRAVENOUS at 16:31

## 2023-01-01 RX ADMIN — CEFEPIME 2000 MG: 2 INJECTION, POWDER, FOR SOLUTION INTRAVENOUS at 19:48

## 2023-01-01 RX ADMIN — DEXMEDETOMIDINE HYDROCHLORIDE 1.5 MCG/KG/HR: 400 INJECTION, SOLUTION INTRAVENOUS at 00:28

## 2023-01-01 RX ADMIN — CHLORHEXIDINE GLUCONATE 15 ML: 1.2 RINSE ORAL at 09:59

## 2023-01-01 RX ADMIN — DEXMEDETOMIDINE HYDROCHLORIDE 1.5 MCG/KG/HR: 400 INJECTION, SOLUTION INTRAVENOUS at 16:05

## 2023-01-01 RX ADMIN — HYDROMORPHONE HYDROCHLORIDE 2 MG: 2 TABLET ORAL at 05:15

## 2023-01-01 RX ADMIN — EPINEPHRINE 6 MCG/MIN: 1 INJECTION INTRAMUSCULAR; INTRAVENOUS; SUBCUTANEOUS at 22:09

## 2023-01-01 RX ADMIN — BUMETANIDE 2 MG: 0.25 INJECTION INTRAMUSCULAR; INTRAVENOUS at 07:30

## 2023-01-01 RX ADMIN — PANTOPRAZOLE SODIUM 40 MG: 40 INJECTION, POWDER, FOR SOLUTION INTRAVENOUS at 08:20

## 2023-01-01 RX ADMIN — ALBUMIN (HUMAN) 12.5 G: 12.5 INJECTION, SOLUTION INTRAVENOUS at 17:50

## 2023-01-01 RX ADMIN — CHLOROTHIAZIDE SODIUM 500 MG: 500 INJECTION, POWDER, LYOPHILIZED, FOR SOLUTION INTRAVENOUS at 23:38

## 2023-01-01 RX ADMIN — NICARDIPINE HYDROCHLORIDE 5 MG/HR: 25 INJECTION, SOLUTION INTRAVENOUS at 03:18

## 2023-01-01 RX ADMIN — IPRATROPIUM BROMIDE AND ALBUTEROL SULFATE 1 DOSE: 2.5; .5 SOLUTION RESPIRATORY (INHALATION) at 19:26

## 2023-01-01 RX ADMIN — PIPERACILLIN AND TAZOBACTAM 3375 MG: 3; .375 INJECTION, POWDER, LYOPHILIZED, FOR SOLUTION INTRAVENOUS at 21:04

## 2023-01-01 RX ADMIN — SODIUM CHLORIDE, PRESERVATIVE FREE 10 ML: 5 INJECTION INTRAVENOUS at 08:34

## 2023-01-01 RX ADMIN — HYDROMORPHONE HYDROCHLORIDE 2 MG: 1 INJECTION, SOLUTION INTRAMUSCULAR; INTRAVENOUS; SUBCUTANEOUS at 11:08

## 2023-01-01 RX ADMIN — AMIODARONE HYDROCHLORIDE 400 MG: 200 TABLET ORAL at 08:15

## 2023-01-01 RX ADMIN — TOBRAMYCIN 300 MG: 300 SOLUTION ORAL at 07:58

## 2023-01-01 RX ADMIN — AMIODARONE HYDROCHLORIDE 1 MG/MIN: 50 INJECTION, SOLUTION INTRAVENOUS at 04:00

## 2023-01-01 RX ADMIN — DEXMEDETOMIDINE HYDROCHLORIDE 1.5 MCG/KG/HR: 400 INJECTION, SOLUTION INTRAVENOUS at 19:05

## 2023-01-01 RX ADMIN — POTASSIUM BICARBONATE 40 MEQ: 782 TABLET, EFFERVESCENT ORAL at 07:37

## 2023-01-01 RX ADMIN — DEXMEDETOMIDINE HYDROCHLORIDE 1.5 MCG/KG/HR: 400 INJECTION, SOLUTION INTRAVENOUS at 10:22

## 2023-01-01 RX ADMIN — FAMOTIDINE 20 MG: 10 INJECTION INTRAVENOUS at 21:12

## 2023-01-01 RX ADMIN — ASPIRIN 81 MG: 81 TABLET, COATED ORAL at 12:33

## 2023-01-01 RX ADMIN — SODIUM CHLORIDE, PRESERVATIVE FREE 10 ML: 5 INJECTION INTRAVENOUS at 20:42

## 2023-01-01 RX ADMIN — DEXMEDETOMIDINE HYDROCHLORIDE 1.5 MCG/KG/HR: 400 INJECTION, SOLUTION INTRAVENOUS at 04:07

## 2023-01-01 RX ADMIN — MIDAZOLAM 2 MG: 1 INJECTION INTRAMUSCULAR; INTRAVENOUS at 11:19

## 2023-01-01 RX ADMIN — VASOPRESSIN 0.04 UNITS/MIN: 20 INJECTION INTRAVENOUS at 03:01

## 2023-01-01 RX ADMIN — IPRATROPIUM BROMIDE AND ALBUTEROL SULFATE 1 DOSE: 2.5; .5 SOLUTION RESPIRATORY (INHALATION) at 15:23

## 2023-01-01 RX ADMIN — NICARDIPINE HYDROCHLORIDE 5 MG/HR: 25 INJECTION, SOLUTION INTRAVENOUS at 06:05

## 2023-01-01 RX ADMIN — NYSTATIN 500000 UNITS: 100000 SUSPENSION ORAL at 16:00

## 2023-01-01 RX ADMIN — DEXMEDETOMIDINE HYDROCHLORIDE 1.5 MCG/KG/HR: 400 INJECTION, SOLUTION INTRAVENOUS at 01:00

## 2023-01-01 RX ADMIN — PROPOFOL 35 MCG/KG/MIN: 10 INJECTION, EMULSION INTRAVENOUS at 12:13

## 2023-01-01 RX ADMIN — TOBRAMYCIN 300 MG: 300 SOLUTION ORAL at 20:05

## 2023-01-01 RX ADMIN — SENNOSIDES AND DOCUSATE SODIUM 1 TABLET: 8.6; 5 TABLET ORAL at 11:13

## 2023-01-01 RX ADMIN — SODIUM CHLORIDE 0.49 UNITS/HR: 9 INJECTION, SOLUTION INTRAVENOUS at 16:24

## 2023-01-01 RX ADMIN — WHITE PETROLATUM 57.7 %-MINERAL OIL 31.9 % EYE OINTMENT: at 23:54

## 2023-01-01 RX ADMIN — IPRATROPIUM BROMIDE AND ALBUTEROL SULFATE 1 DOSE: 2.5; .5 SOLUTION RESPIRATORY (INHALATION) at 08:05

## 2023-01-01 RX ADMIN — POTASSIUM CHLORIDE 20 MEQ: 400 INJECTION, SOLUTION INTRAVENOUS at 13:21

## 2023-01-01 RX ADMIN — HYDROMORPHONE HYDROCHLORIDE 1 MG: 1 INJECTION, SOLUTION INTRAMUSCULAR; INTRAVENOUS; SUBCUTANEOUS at 01:50

## 2023-01-01 RX ADMIN — Medication 2 UNITS: at 09:03

## 2023-01-01 RX ADMIN — CHLORHEXIDINE GLUCONATE 15 ML: 1.2 RINSE ORAL at 08:34

## 2023-01-01 RX ADMIN — BUDESONIDE 500 MCG: 0.5 INHALANT RESPIRATORY (INHALATION) at 08:41

## 2023-01-01 RX ADMIN — DEXMEDETOMIDINE HYDROCHLORIDE 1.5 MCG/KG/HR: 400 INJECTION, SOLUTION INTRAVENOUS at 07:33

## 2023-01-01 RX ADMIN — INSULIN HUMAN 12 UNITS: 100 INJECTION, SUSPENSION SUBCUTANEOUS at 13:22

## 2023-01-01 RX ADMIN — METRONIDAZOLE 500 MG: 500 INJECTION, SOLUTION INTRAVENOUS at 13:30

## 2023-01-01 RX ADMIN — VASOPRESSIN 0.04 UNITS/MIN: 20 INJECTION INTRAVENOUS at 12:01

## 2023-01-01 RX ADMIN — DEXMEDETOMIDINE HYDROCHLORIDE 1.5 MCG/KG/HR: 400 INJECTION, SOLUTION INTRAVENOUS at 21:56

## 2023-01-01 RX ADMIN — NOREPINEPHRINE BITARTRATE 30 MCG/MIN: 1 INJECTION, SOLUTION, CONCENTRATE INTRAVENOUS at 12:29

## 2023-01-01 RX ADMIN — AMIODARONE HYDROCHLORIDE 400 MG: 200 TABLET ORAL at 09:36

## 2023-01-01 RX ADMIN — CLONAZEPAM 1 MG: 0.5 TABLET ORAL at 09:28

## 2023-01-01 RX ADMIN — MIDAZOLAM 1 MG: 1 INJECTION INTRAMUSCULAR; INTRAVENOUS at 00:30

## 2023-01-01 RX ADMIN — SODIUM CHLORIDE, PRESERVATIVE FREE 10 ML: 5 INJECTION INTRAVENOUS at 08:47

## 2023-01-01 RX ADMIN — IPRATROPIUM BROMIDE AND ALBUTEROL SULFATE 1 DOSE: 2.5; .5 SOLUTION RESPIRATORY (INHALATION) at 07:51

## 2023-01-01 RX ADMIN — MIDAZOLAM 1 MG: 1 INJECTION INTRAMUSCULAR; INTRAVENOUS at 23:49

## 2023-01-01 RX ADMIN — MUPIROCIN: 20 OINTMENT TOPICAL at 09:15

## 2023-01-01 RX ADMIN — CHLORHEXIDINE GLUCONATE 15 ML: 1.2 RINSE ORAL at 08:46

## 2023-01-01 RX ADMIN — ALBUTEROL SULFATE 2.5 MG: 2.5 SOLUTION RESPIRATORY (INHALATION) at 06:56

## 2023-01-01 RX ADMIN — DEXMEDETOMIDINE HYDROCHLORIDE 1.5 MCG/KG/HR: 400 INJECTION, SOLUTION INTRAVENOUS at 05:25

## 2023-01-01 RX ADMIN — MEROPENEM 1000 MG: 1 INJECTION, POWDER, FOR SOLUTION INTRAVENOUS at 15:36

## 2023-01-01 RX ADMIN — AMIODARONE HYDROCHLORIDE 400 MG: 200 TABLET ORAL at 21:20

## 2023-01-01 RX ADMIN — Medication: at 10:26

## 2023-01-01 RX ADMIN — DEXMEDETOMIDINE HYDROCHLORIDE 1.2 MCG/KG/HR: 400 INJECTION, SOLUTION INTRAVENOUS at 04:22

## 2023-01-01 RX ADMIN — BUMETANIDE 1 MG: 0.25 INJECTION, SOLUTION INTRAMUSCULAR; INTRAVENOUS at 07:48

## 2023-01-01 RX ADMIN — VASOPRESSIN 0.04 UNITS/MIN: 20 INJECTION INTRAVENOUS at 04:39

## 2023-01-01 RX ADMIN — PANTOPRAZOLE SODIUM 40 MG: 40 INJECTION, POWDER, FOR SOLUTION INTRAVENOUS at 09:01

## 2023-01-01 RX ADMIN — HYDROMORPHONE HYDROCHLORIDE 1 MG: 1 INJECTION, SOLUTION INTRAMUSCULAR; INTRAVENOUS; SUBCUTANEOUS at 05:27

## 2023-01-01 RX ADMIN — DEXMEDETOMIDINE HYDROCHLORIDE 1.5 MCG/KG/HR: 400 INJECTION, SOLUTION INTRAVENOUS at 05:29

## 2023-01-01 RX ADMIN — HYDROMORPHONE HYDROCHLORIDE 1 MG: 1 INJECTION, SOLUTION INTRAMUSCULAR; INTRAVENOUS; SUBCUTANEOUS at 06:13

## 2023-01-01 RX ADMIN — SODIUM CHLORIDE 1000 MG: 9 INJECTION INTRAMUSCULAR; INTRAVENOUS; SUBCUTANEOUS at 23:26

## 2023-01-01 RX ADMIN — SODIUM CHLORIDE, PRESERVATIVE FREE 10 ML: 5 INJECTION INTRAVENOUS at 08:17

## 2023-01-01 RX ADMIN — MAGNESIUM OXIDE TAB 400 MG (241.3 MG ELEMENTAL MG) 400 MG: 400 (241.3 MG) TAB at 11:13

## 2023-01-01 RX ADMIN — Medication: at 09:01

## 2023-01-01 RX ADMIN — CHLORHEXIDINE GLUCONATE 15 ML: 1.2 RINSE ORAL at 20:14

## 2023-01-01 RX ADMIN — MEROPENEM 1000 MG: 1 INJECTION, POWDER, FOR SOLUTION INTRAVENOUS at 08:14

## 2023-01-01 RX ADMIN — DEXMEDETOMIDINE HYDROCHLORIDE 0.9 MCG/KG/HR: 400 INJECTION, SOLUTION INTRAVENOUS at 16:53

## 2023-01-01 RX ADMIN — ALBUMIN (HUMAN) 12.5 G: 12.5 INJECTION, SOLUTION INTRAVENOUS at 01:43

## 2023-01-01 RX ADMIN — SODIUM CHLORIDE 10 ML/HR: 4.5 INJECTION, SOLUTION INTRAVENOUS at 19:58

## 2023-01-01 RX ADMIN — NYSTATIN 500000 UNITS: 100000 SUSPENSION ORAL at 15:55

## 2023-01-01 RX ADMIN — Medication 1 UNITS: at 17:44

## 2023-01-01 RX ADMIN — SODIUM CHLORIDE, PRESERVATIVE FREE 10 ML: 5 INJECTION INTRAVENOUS at 21:04

## 2023-01-01 RX ADMIN — MIDAZOLAM 1 MG: 1 INJECTION INTRAMUSCULAR; INTRAVENOUS at 09:35

## 2023-01-01 RX ADMIN — DEXMEDETOMIDINE HYDROCHLORIDE 1.5 MCG/KG/HR: 400 INJECTION, SOLUTION INTRAVENOUS at 05:55

## 2023-01-01 RX ADMIN — WATER 2000 MG: 1 INJECTION INTRAMUSCULAR; INTRAVENOUS; SUBCUTANEOUS at 13:23

## 2023-01-01 RX ADMIN — PIPERACILLIN AND TAZOBACTAM 3375 MG: 3; .375 INJECTION, POWDER, LYOPHILIZED, FOR SOLUTION INTRAVENOUS at 13:39

## 2023-01-01 RX ADMIN — SENNOSIDES AND DOCUSATE SODIUM 1 TABLET: 8.6; 5 TABLET ORAL at 10:52

## 2023-01-01 RX ADMIN — POTASSIUM BICARBONATE 40 MEQ: 782 TABLET, EFFERVESCENT ORAL at 21:00

## 2023-01-01 RX ADMIN — HYDROMORPHONE HYDROCHLORIDE 0.5 MG/HR: 10 INJECTION, SOLUTION INTRAMUSCULAR; INTRAVENOUS; SUBCUTANEOUS at 19:58

## 2023-01-01 RX ADMIN — CHLORHEXIDINE GLUCONATE 15 ML: 1.2 RINSE ORAL at 08:18

## 2023-01-01 RX ADMIN — DEXMEDETOMIDINE HYDROCHLORIDE 1.5 MCG/KG/HR: 400 INJECTION, SOLUTION INTRAVENOUS at 09:16

## 2023-01-01 RX ADMIN — CLONAZEPAM 1 MG: 0.5 TABLET ORAL at 02:06

## 2023-01-01 RX ADMIN — POLYETHYLENE GLYCOL 3350 17 G: 17 POWDER, FOR SOLUTION ORAL at 08:52

## 2023-01-01 RX ADMIN — TOBRAMYCIN 300 MG: 300 SOLUTION ORAL at 07:08

## 2023-01-01 RX ADMIN — PROPOFOL 45 MCG/KG/MIN: 10 INJECTION, EMULSION INTRAVENOUS at 00:00

## 2023-01-01 RX ADMIN — KETAMINE HYDROCHLORIDE 0.4 MG/KG/HR: 100 INJECTION INTRAMUSCULAR; INTRAVENOUS at 11:09

## 2023-01-01 RX ADMIN — PROPOFOL 30 MCG/KG/MIN: 10 INJECTION, EMULSION INTRAVENOUS at 11:06

## 2023-01-01 RX ADMIN — PROPOFOL 45 MCG/KG/MIN: 10 INJECTION, EMULSION INTRAVENOUS at 06:00

## 2023-01-01 RX ADMIN — VANCOMYCIN HYDROCHLORIDE 750 MG: 750 INJECTION, POWDER, LYOPHILIZED, FOR SOLUTION INTRAVENOUS at 11:36

## 2023-01-01 RX ADMIN — QUETIAPINE FUMARATE 50 MG: 25 TABLET ORAL at 09:05

## 2023-01-01 RX ADMIN — METRONIDAZOLE 500 MG: 500 INJECTION, SOLUTION INTRAVENOUS at 20:30

## 2023-01-01 RX ADMIN — Medication 16 UNITS: at 11:47

## 2023-01-01 RX ADMIN — DEXMEDETOMIDINE HYDROCHLORIDE 1.5 MCG/KG/HR: 400 INJECTION, SOLUTION INTRAVENOUS at 19:26

## 2023-01-01 RX ADMIN — PIPERACILLIN AND TAZOBACTAM 3375 MG: 3; .375 INJECTION, POWDER, LYOPHILIZED, FOR SOLUTION INTRAVENOUS at 13:17

## 2023-01-01 RX ADMIN — BUDESONIDE 500 MCG: 0.5 INHALANT RESPIRATORY (INHALATION) at 08:19

## 2023-01-01 RX ADMIN — VANCOMYCIN HYDROCHLORIDE 1000 MG: 1 INJECTION, POWDER, LYOPHILIZED, FOR SOLUTION INTRAVENOUS at 09:25

## 2023-01-01 RX ADMIN — CHLORHEXIDINE GLUCONATE 15 ML: 1.2 RINSE ORAL at 20:43

## 2023-01-01 RX ADMIN — MEROPENEM 1000 MG: 1 INJECTION, POWDER, FOR SOLUTION INTRAVENOUS at 00:02

## 2023-01-01 RX ADMIN — MAGNESIUM OXIDE TAB 400 MG (241.3 MG ELEMENTAL MG) 400 MG: 400 (241.3 MG) TAB at 20:47

## 2023-01-01 RX ADMIN — HEPARIN SODIUM 5000 UNITS: 5000 INJECTION INTRAVENOUS; SUBCUTANEOUS at 14:26

## 2023-01-01 RX ADMIN — NYSTATIN 500000 UNITS: 100000 SUSPENSION ORAL at 12:09

## 2023-01-01 RX ADMIN — DEXMEDETOMIDINE HYDROCHLORIDE 1.5 MCG/KG/HR: 400 INJECTION, SOLUTION INTRAVENOUS at 19:07

## 2023-01-01 RX ADMIN — POTASSIUM BICARBONATE 40 MEQ: 782 TABLET, EFFERVESCENT ORAL at 08:19

## 2023-01-01 RX ADMIN — AMIODARONE HYDROCHLORIDE 1 MG/MIN: 50 INJECTION, SOLUTION INTRAVENOUS at 21:00

## 2023-01-01 RX ADMIN — HYDROMORPHONE HYDROCHLORIDE 1 MG: 1 INJECTION, SOLUTION INTRAMUSCULAR; INTRAVENOUS; SUBCUTANEOUS at 06:40

## 2023-01-01 RX ADMIN — IPRATROPIUM BROMIDE AND ALBUTEROL SULFATE 1 DOSE: 2.5; .5 SOLUTION RESPIRATORY (INHALATION) at 11:44

## 2023-01-01 RX ADMIN — Medication 100 MCG/HR: at 00:43

## 2023-01-01 RX ADMIN — METRONIDAZOLE 500 MG: 500 INJECTION, SOLUTION INTRAVENOUS at 21:46

## 2023-01-01 RX ADMIN — NICARDIPINE HYDROCHLORIDE 15 MG/HR: 25 INJECTION, SOLUTION INTRAVENOUS at 21:53

## 2023-01-01 RX ADMIN — CALCIUM CHLORIDE INJECTION 1000 MG: 100 INJECTION, SOLUTION INTRAVENOUS at 06:15

## 2023-01-01 RX ADMIN — DEXMEDETOMIDINE HYDROCHLORIDE 1 MCG/KG/HR: 400 INJECTION, SOLUTION INTRAVENOUS at 08:39

## 2023-01-01 RX ADMIN — ALBUMIN (HUMAN) 12.5 G: 12.5 INJECTION, SOLUTION INTRAVENOUS at 14:49

## 2023-01-01 RX ADMIN — WHITE PETROLATUM 57.7 %-MINERAL OIL 31.9 % EYE OINTMENT: at 20:20

## 2023-01-01 RX ADMIN — CHLORHEXIDINE GLUCONATE 15 ML: 1.2 RINSE ORAL at 21:21

## 2023-01-01 RX ADMIN — PIPERACILLIN AND TAZOBACTAM 3375 MG: 3; .375 INJECTION, POWDER, LYOPHILIZED, FOR SOLUTION INTRAVENOUS at 22:36

## 2023-01-01 RX ADMIN — DEXMEDETOMIDINE HYDROCHLORIDE 0.9 MCG/KG/HR: 400 INJECTION, SOLUTION INTRAVENOUS at 04:46

## 2023-01-01 RX ADMIN — FAMOTIDINE 20 MG: 10 INJECTION INTRAVENOUS at 21:32

## 2023-01-01 RX ADMIN — IPRATROPIUM BROMIDE AND ALBUTEROL SULFATE 1 DOSE: 2.5; .5 SOLUTION RESPIRATORY (INHALATION) at 07:38

## 2023-01-01 RX ADMIN — NOREPINEPHRINE BITARTRATE 35 MCG/MIN: 1 INJECTION, SOLUTION, CONCENTRATE INTRAVENOUS at 06:46

## 2023-01-01 RX ADMIN — IPRATROPIUM BROMIDE AND ALBUTEROL SULFATE 1 DOSE: 2.5; .5 SOLUTION RESPIRATORY (INHALATION) at 16:22

## 2023-01-01 RX ADMIN — IPRATROPIUM BROMIDE AND ALBUTEROL SULFATE 1 DOSE: 2.5; .5 SOLUTION RESPIRATORY (INHALATION) at 08:11

## 2023-01-01 RX ADMIN — WHITE PETROLATUM 57.7 %-MINERAL OIL 31.9 % EYE OINTMENT: at 20:42

## 2023-01-01 RX ADMIN — IPRATROPIUM BROMIDE AND ALBUTEROL SULFATE 1 DOSE: 2.5; .5 SOLUTION RESPIRATORY (INHALATION) at 12:52

## 2023-01-01 RX ADMIN — MIDAZOLAM 1 MG: 1 INJECTION INTRAMUSCULAR; INTRAVENOUS at 03:47

## 2023-01-01 RX ADMIN — VASOPRESSIN: 20 INJECTION, SOLUTION INTRAVENOUS at 22:01

## 2023-01-01 RX ADMIN — FUROSEMIDE 60 MG: 10 INJECTION, SOLUTION INTRAMUSCULAR; INTRAVENOUS at 17:42

## 2023-01-01 RX ADMIN — TOBRAMYCIN 300 MG: 300 SOLUTION ORAL at 20:01

## 2023-01-01 RX ADMIN — PROPOFOL 30 MCG/KG/MIN: 10 INJECTION, EMULSION INTRAVENOUS at 15:56

## 2023-01-01 RX ADMIN — CLONAZEPAM 1 MG: 0.5 TABLET ORAL at 17:42

## 2023-01-01 RX ADMIN — Medication: at 20:59

## 2023-01-01 RX ADMIN — SODIUM CHLORIDE, PRESERVATIVE FREE 10 ML: 5 INJECTION INTRAVENOUS at 20:58

## 2023-01-01 RX ADMIN — DEXMEDETOMIDINE HYDROCHLORIDE 1.5 MCG/KG/HR: 400 INJECTION, SOLUTION INTRAVENOUS at 04:51

## 2023-01-01 RX ADMIN — PIPERACILLIN AND TAZOBACTAM 3375 MG: 3; .375 INJECTION, POWDER, LYOPHILIZED, FOR SOLUTION INTRAVENOUS at 21:20

## 2023-01-01 RX ADMIN — CHLORHEXIDINE GLUCONATE 15 ML: 1.2 RINSE ORAL at 09:01

## 2023-01-01 RX ADMIN — NYSTATIN 500000 UNITS: 100000 SUSPENSION ORAL at 13:15

## 2023-01-01 RX ADMIN — TOBRAMYCIN 300 MG: 300 SOLUTION ORAL at 19:49

## 2023-01-01 RX ADMIN — Medication: at 08:22

## 2023-01-01 RX ADMIN — INSULIN GLARGINE 12 UNITS: 100 INJECTION, SOLUTION SUBCUTANEOUS at 22:41

## 2023-01-01 RX ADMIN — SODIUM CHLORIDE, PRESERVATIVE FREE 10 ML: 5 INJECTION INTRAVENOUS at 20:52

## 2023-01-01 RX ADMIN — SODIUM CHLORIDE, PRESERVATIVE FREE 10 ML: 5 INJECTION INTRAVENOUS at 20:43

## 2023-01-01 RX ADMIN — MIDAZOLAM 1 MG: 1 INJECTION INTRAMUSCULAR; INTRAVENOUS at 21:42

## 2023-01-01 RX ADMIN — INSULIN HUMAN 15 UNITS: 100 INJECTION, SUSPENSION SUBCUTANEOUS at 00:40

## 2023-01-01 RX ADMIN — DEXMEDETOMIDINE HYDROCHLORIDE 1 MCG/KG/HR: 400 INJECTION, SOLUTION INTRAVENOUS at 20:06

## 2023-01-01 RX ADMIN — IPRATROPIUM BROMIDE AND ALBUTEROL SULFATE 1 DOSE: 2.5; .5 SOLUTION RESPIRATORY (INHALATION) at 14:53

## 2023-01-01 RX ADMIN — POTASSIUM BICARBONATE 40 MEQ: 782 TABLET, EFFERVESCENT ORAL at 21:03

## 2023-01-01 RX ADMIN — POTASSIUM BICARBONATE 40 MEQ: 782 TABLET, EFFERVESCENT ORAL at 08:17

## 2023-01-01 RX ADMIN — POTASSIUM BICARBONATE 40 MEQ: 782 TABLET, EFFERVESCENT ORAL at 08:32

## 2023-01-01 RX ADMIN — IPRATROPIUM BROMIDE AND ALBUTEROL SULFATE 1 DOSE: 2.5; .5 SOLUTION RESPIRATORY (INHALATION) at 15:12

## 2023-01-01 RX ADMIN — DEXMEDETOMIDINE HYDROCHLORIDE 1.5 MCG/KG/HR: 400 INJECTION, SOLUTION INTRAVENOUS at 12:53

## 2023-01-01 RX ADMIN — BUMETANIDE 1 MG: 0.25 INJECTION INTRAMUSCULAR; INTRAVENOUS at 23:19

## 2023-01-01 RX ADMIN — BIVALIRUDIN 0.52 MG/KG/HR: 250 INJECTION, POWDER, LYOPHILIZED, FOR SOLUTION INTRAVENOUS at 14:49

## 2023-01-01 RX ADMIN — PROPOFOL 20 MCG/KG/MIN: 10 INJECTION, EMULSION INTRAVENOUS at 05:56

## 2023-01-01 RX ADMIN — AMIODARONE HYDROCHLORIDE 400 MG: 200 TABLET ORAL at 09:12

## 2023-01-01 RX ADMIN — AMIODARONE HYDROCHLORIDE 1 MG/MIN: 50 INJECTION, SOLUTION INTRAVENOUS at 22:32

## 2023-01-01 RX ADMIN — DEXMEDETOMIDINE HYDROCHLORIDE 1.5 MCG/KG/HR: 400 INJECTION, SOLUTION INTRAVENOUS at 16:25

## 2023-01-01 RX ADMIN — HYDROMORPHONE HYDROCHLORIDE 1 MG: 1 INJECTION, SOLUTION INTRAMUSCULAR; INTRAVENOUS; SUBCUTANEOUS at 10:01

## 2023-01-01 RX ADMIN — INSULIN HUMAN 15 UNITS: 100 INJECTION, SUSPENSION SUBCUTANEOUS at 12:51

## 2023-01-01 RX ADMIN — IPRATROPIUM BROMIDE AND ALBUTEROL SULFATE 1 DOSE: 2.5; .5 SOLUTION RESPIRATORY (INHALATION) at 12:42

## 2023-01-01 RX ADMIN — PIPERACILLIN AND TAZOBACTAM 3375 MG: 3; .375 INJECTION, POWDER, LYOPHILIZED, FOR SOLUTION INTRAVENOUS at 06:14

## 2023-01-01 RX ADMIN — PIPERACILLIN AND TAZOBACTAM 3375 MG: 3; .375 INJECTION, POWDER, LYOPHILIZED, FOR SOLUTION INTRAVENOUS at 13:58

## 2023-01-01 RX ADMIN — HYDROMORPHONE HYDROCHLORIDE 1 MG: 1 INJECTION, SOLUTION INTRAMUSCULAR; INTRAVENOUS; SUBCUTANEOUS at 22:32

## 2023-01-01 RX ADMIN — QUETIAPINE FUMARATE 50 MG: 25 TABLET ORAL at 20:08

## 2023-01-01 RX ADMIN — POTASSIUM CHLORIDE 20 MEQ: 400 INJECTION, SOLUTION INTRAVENOUS at 06:18

## 2023-01-01 RX ADMIN — DEXMEDETOMIDINE HYDROCHLORIDE 1.5 MCG/KG/HR: 400 INJECTION, SOLUTION INTRAVENOUS at 01:24

## 2023-01-01 RX ADMIN — MIDAZOLAM 1 MG: 1 INJECTION INTRAMUSCULAR; INTRAVENOUS at 17:20

## 2023-01-01 RX ADMIN — MIDAZOLAM 4 MG: 1 INJECTION INTRAMUSCULAR; INTRAVENOUS at 10:43

## 2023-01-01 RX ADMIN — ALPRAZOLAM 0.5 MG: 0.5 TABLET ORAL at 20:20

## 2023-01-01 RX ADMIN — MEROPENEM 1000 MG: 1 INJECTION, POWDER, FOR SOLUTION INTRAVENOUS at 07:23

## 2023-01-01 RX ADMIN — HYDRALAZINE HYDROCHLORIDE 10 MG: 20 INJECTION, SOLUTION INTRAMUSCULAR; INTRAVENOUS at 03:37

## 2023-01-01 RX ADMIN — QUETIAPINE FUMARATE 50 MG: 25 TABLET ORAL at 08:49

## 2023-01-01 RX ADMIN — DEXMEDETOMIDINE HYDROCHLORIDE 1.5 MCG/KG/HR: 400 INJECTION, SOLUTION INTRAVENOUS at 05:45

## 2023-01-01 RX ADMIN — HYDROMORPHONE HYDROCHLORIDE 2 MG: 1 INJECTION, SOLUTION INTRAMUSCULAR; INTRAVENOUS; SUBCUTANEOUS at 11:26

## 2023-01-01 RX ADMIN — Medication: at 20:19

## 2023-01-01 RX ADMIN — POTASSIUM CHLORIDE 20 MEQ: 400 INJECTION, SOLUTION INTRAVENOUS at 17:03

## 2023-01-01 RX ADMIN — IPRATROPIUM BROMIDE AND ALBUTEROL SULFATE 1 DOSE: 2.5; .5 SOLUTION RESPIRATORY (INHALATION) at 08:00

## 2023-01-01 RX ADMIN — WATER 2000 MG: 1 INJECTION INTRAMUSCULAR; INTRAVENOUS; SUBCUTANEOUS at 05:56

## 2023-01-01 RX ADMIN — WATER 2000 MG: 1 INJECTION INTRAMUSCULAR; INTRAVENOUS; SUBCUTANEOUS at 14:19

## 2023-01-01 RX ADMIN — NICARDIPINE HYDROCHLORIDE 5 MG/HR: 25 INJECTION, SOLUTION INTRAVENOUS at 23:20

## 2023-01-01 RX ADMIN — CHLOROTHIAZIDE SODIUM 500 MG: 500 INJECTION, POWDER, LYOPHILIZED, FOR SOLUTION INTRAVENOUS at 09:50

## 2023-01-01 RX ADMIN — CHLORHEXIDINE GLUCONATE 15 ML: 1.2 RINSE ORAL at 21:23

## 2023-01-01 RX ADMIN — LORAZEPAM 1 MG: 2 INJECTION INTRAMUSCULAR; INTRAVENOUS at 11:30

## 2023-01-01 RX ADMIN — MICAFUNGIN SODIUM 150 MG: 100 INJECTION, POWDER, LYOPHILIZED, FOR SOLUTION INTRAVENOUS at 10:22

## 2023-01-01 RX ADMIN — NYSTATIN 500000 UNITS: 100000 SUSPENSION ORAL at 08:55

## 2023-01-01 RX ADMIN — HEPARIN SODIUM 5000 UNITS: 5000 INJECTION INTRAVENOUS; SUBCUTANEOUS at 05:37

## 2023-01-01 RX ADMIN — MIDAZOLAM 1 MG: 1 INJECTION INTRAMUSCULAR; INTRAVENOUS at 20:30

## 2023-01-01 RX ADMIN — BUDESONIDE 500 MCG: 0.5 INHALANT RESPIRATORY (INHALATION) at 08:49

## 2023-01-01 RX ADMIN — MIDAZOLAM 1 MG: 1 INJECTION INTRAMUSCULAR; INTRAVENOUS at 04:11

## 2023-01-01 RX ADMIN — AMIODARONE HYDROCHLORIDE 400 MG: 200 TABLET ORAL at 21:03

## 2023-01-01 RX ADMIN — MIDAZOLAM 1 MG: 1 INJECTION INTRAMUSCULAR; INTRAVENOUS at 03:26

## 2023-01-01 RX ADMIN — SODIUM CHLORIDE, PRESERVATIVE FREE 10 ML: 5 INJECTION INTRAVENOUS at 20:09

## 2023-01-01 RX ADMIN — SODIUM CHLORIDE, PRESERVATIVE FREE 10 ML: 5 INJECTION INTRAVENOUS at 09:28

## 2023-01-01 RX ADMIN — HYDROMORPHONE HYDROCHLORIDE 2.5 MG/HR: 10 INJECTION, SOLUTION INTRAMUSCULAR; INTRAVENOUS; SUBCUTANEOUS at 06:00

## 2023-01-01 RX ADMIN — ALBUMIN (HUMAN) 12.5 G: 12.5 INJECTION, SOLUTION INTRAVENOUS at 02:49

## 2023-01-01 RX ADMIN — NYSTATIN 500000 UNITS: 100000 SUSPENSION ORAL at 09:02

## 2023-01-01 RX ADMIN — Medication: at 09:00

## 2023-01-01 RX ADMIN — MIDAZOLAM 1 MG: 1 INJECTION INTRAMUSCULAR; INTRAVENOUS at 03:21

## 2023-01-01 RX ADMIN — SODIUM CHLORIDE 4.59 UNITS/HR: 9 INJECTION, SOLUTION INTRAVENOUS at 22:12

## 2023-01-01 RX ADMIN — DEXMEDETOMIDINE HYDROCHLORIDE 1.5 MCG/KG/HR: 400 INJECTION, SOLUTION INTRAVENOUS at 09:28

## 2023-01-01 RX ADMIN — SENNOSIDES AND DOCUSATE SODIUM 1 TABLET: 8.6; 5 TABLET ORAL at 09:37

## 2023-01-01 RX ADMIN — BUDESONIDE 500 MCG: 0.5 INHALANT RESPIRATORY (INHALATION) at 07:19

## 2023-01-01 RX ADMIN — TOBRAMYCIN 300 MG: 300 SOLUTION ORAL at 19:42

## 2023-01-01 RX ADMIN — DEXMEDETOMIDINE HYDROCHLORIDE 1.5 MCG/KG/HR: 400 INJECTION, SOLUTION INTRAVENOUS at 22:30

## 2023-01-01 RX ADMIN — CHLORHEXIDINE GLUCONATE 15 ML: 1.2 RINSE ORAL at 09:38

## 2023-01-01 RX ADMIN — ACETYLCYSTEINE 600 MG: 200 INHALANT RESPIRATORY (INHALATION) at 08:22

## 2023-01-01 RX ADMIN — DEXMEDETOMIDINE HYDROCHLORIDE 1.5 MCG/KG/HR: 400 INJECTION, SOLUTION INTRAVENOUS at 01:29

## 2023-01-01 RX ADMIN — EPINEPHRINE 7 MCG/MIN: 1 INJECTION INTRAMUSCULAR; INTRAVENOUS; SUBCUTANEOUS at 04:43

## 2023-01-01 RX ADMIN — HYDROMORPHONE HYDROCHLORIDE 1 MG: 1 INJECTION, SOLUTION INTRAMUSCULAR; INTRAVENOUS; SUBCUTANEOUS at 21:27

## 2023-01-01 RX ADMIN — PROPOFOL 30 MCG/KG/MIN: 10 INJECTION, EMULSION INTRAVENOUS at 15:08

## 2023-01-01 RX ADMIN — HYDROMORPHONE HYDROCHLORIDE 1 MG: 1 INJECTION, SOLUTION INTRAMUSCULAR; INTRAVENOUS; SUBCUTANEOUS at 21:06

## 2023-01-01 RX ADMIN — DEXMEDETOMIDINE HYDROCHLORIDE 1.5 MCG/KG/HR: 400 INJECTION, SOLUTION INTRAVENOUS at 14:15

## 2023-01-01 RX ADMIN — Medication 100 MCG/HR: at 07:50

## 2023-01-01 RX ADMIN — POTASSIUM CHLORIDE 20 MEQ: 400 INJECTION, SOLUTION INTRAVENOUS at 06:58

## 2023-01-01 RX ADMIN — NYSTATIN 500000 UNITS: 100000 SUSPENSION ORAL at 21:20

## 2023-01-01 RX ADMIN — MIDAZOLAM 1 MG: 1 INJECTION INTRAMUSCULAR; INTRAVENOUS at 05:15

## 2023-01-01 RX ADMIN — MIDAZOLAM 1 MG: 1 INJECTION INTRAMUSCULAR; INTRAVENOUS at 09:27

## 2023-01-01 RX ADMIN — VANCOMYCIN HYDROCHLORIDE 1000 MG: 1 INJECTION, POWDER, LYOPHILIZED, FOR SOLUTION INTRAVENOUS at 21:10

## 2023-01-01 RX ADMIN — VANCOMYCIN HYDROCHLORIDE 750 MG: 750 INJECTION, POWDER, LYOPHILIZED, FOR SOLUTION INTRAVENOUS at 20:30

## 2023-01-01 RX ADMIN — FUROSEMIDE 60 MG: 10 INJECTION, SOLUTION INTRAMUSCULAR; INTRAVENOUS at 10:36

## 2023-01-01 RX ADMIN — NICARDIPINE HYDROCHLORIDE 10 MG/HR: 25 INJECTION, SOLUTION INTRAVENOUS at 08:55

## 2023-01-01 RX ADMIN — SENNOSIDES AND DOCUSATE SODIUM 1 TABLET: 8.6; 5 TABLET ORAL at 21:03

## 2023-01-01 RX ADMIN — ALBUMIN (HUMAN) 12.5 G: 12.5 INJECTION, SOLUTION INTRAVENOUS at 14:23

## 2023-01-01 RX ADMIN — IPRATROPIUM BROMIDE AND ALBUTEROL SULFATE 1 DOSE: 2.5; .5 SOLUTION RESPIRATORY (INHALATION) at 12:20

## 2023-01-01 RX ADMIN — MEROPENEM 1000 MG: 1 INJECTION, POWDER, FOR SOLUTION INTRAVENOUS at 08:28

## 2023-01-01 RX ADMIN — GLYCOPYRROLATE 0.2 MG: 0.2 INJECTION INTRAMUSCULAR; INTRAVENOUS at 11:40

## 2023-01-01 RX ADMIN — POTASSIUM BICARBONATE 40 MEQ: 782 TABLET, EFFERVESCENT ORAL at 08:30

## 2023-01-01 RX ADMIN — POLYETHYLENE GLYCOL 3350 17 G: 17 POWDER, FOR SOLUTION ORAL at 09:36

## 2023-01-01 RX ADMIN — DEXMEDETOMIDINE HYDROCHLORIDE 1.5 MCG/KG/HR: 400 INJECTION, SOLUTION INTRAVENOUS at 15:21

## 2023-01-01 RX ADMIN — BIVALIRUDIN 0.43 MG/KG/HR: 250 INJECTION, POWDER, LYOPHILIZED, FOR SOLUTION INTRAVENOUS at 04:44

## 2023-01-01 RX ADMIN — IPRATROPIUM BROMIDE AND ALBUTEROL SULFATE 1 DOSE: 2.5; .5 SOLUTION RESPIRATORY (INHALATION) at 15:52

## 2023-01-01 RX ADMIN — Medication: at 09:59

## 2023-01-01 RX ADMIN — HYDROMORPHONE HYDROCHLORIDE 1 MG: 1 INJECTION, SOLUTION INTRAMUSCULAR; INTRAVENOUS; SUBCUTANEOUS at 20:22

## 2023-01-01 RX ADMIN — POLYETHYLENE GLYCOL 3350 17 G: 17 POWDER, FOR SOLUTION ORAL at 11:14

## 2023-01-01 RX ADMIN — DEXMEDETOMIDINE HYDROCHLORIDE 1.5 MCG/KG/HR: 400 INJECTION, SOLUTION INTRAVENOUS at 18:17

## 2023-01-01 RX ADMIN — AMIODARONE HYDROCHLORIDE 400 MG: 200 TABLET ORAL at 20:47

## 2023-01-01 RX ADMIN — IPRATROPIUM BROMIDE AND ALBUTEROL SULFATE 1 DOSE: 2.5; .5 SOLUTION RESPIRATORY (INHALATION) at 15:20

## 2023-01-01 RX ADMIN — POTASSIUM CHLORIDE 20 MEQ: 400 INJECTION, SOLUTION INTRAVENOUS at 20:06

## 2023-01-01 RX ADMIN — DEXMEDETOMIDINE HYDROCHLORIDE 1.5 MCG/KG/HR: 400 INJECTION, SOLUTION INTRAVENOUS at 07:55

## 2023-01-01 RX ADMIN — HYDRALAZINE HYDROCHLORIDE 10 MG: 20 INJECTION, SOLUTION INTRAMUSCULAR; INTRAVENOUS at 10:03

## 2023-01-01 RX ADMIN — FAMOTIDINE 20 MG: 10 INJECTION INTRAVENOUS at 20:47

## 2023-01-01 RX ADMIN — MUPIROCIN: 20 OINTMENT TOPICAL at 21:14

## 2023-01-01 RX ADMIN — PANTOPRAZOLE SODIUM 40 MG: 40 INJECTION, POWDER, FOR SOLUTION INTRAVENOUS at 08:30

## 2023-01-01 RX ADMIN — DEXMEDETOMIDINE HYDROCHLORIDE 1.5 MCG/KG/HR: 400 INJECTION, SOLUTION INTRAVENOUS at 09:09

## 2023-01-01 RX ADMIN — NYSTATIN 500000 UNITS: 100000 SUSPENSION ORAL at 08:17

## 2023-01-01 RX ADMIN — MIDAZOLAM 1 MG: 1 INJECTION INTRAMUSCULAR; INTRAVENOUS at 21:02

## 2023-01-01 RX ADMIN — DEXMEDETOMIDINE HYDROCHLORIDE 1.5 MCG/KG/HR: 400 INJECTION, SOLUTION INTRAVENOUS at 12:44

## 2023-01-01 RX ADMIN — HYDROMORPHONE HYDROCHLORIDE 1 MG: 1 INJECTION, SOLUTION INTRAMUSCULAR; INTRAVENOUS; SUBCUTANEOUS at 19:13

## 2023-01-01 RX ADMIN — DEXMEDETOMIDINE HYDROCHLORIDE 1.5 MCG/KG/HR: 400 INJECTION, SOLUTION INTRAVENOUS at 00:00

## 2023-01-01 RX ADMIN — BIVALIRUDIN 0.23 MG/KG/HR: 250 INJECTION, POWDER, LYOPHILIZED, FOR SOLUTION INTRAVENOUS at 14:50

## 2023-01-01 RX ADMIN — POTASSIUM CHLORIDE 20 MEQ: 400 INJECTION, SOLUTION INTRAVENOUS at 22:04

## 2023-01-01 RX ADMIN — PIPERACILLIN AND TAZOBACTAM 3375 MG: 3; .375 INJECTION, POWDER, LYOPHILIZED, FOR SOLUTION INTRAVENOUS at 17:15

## 2023-01-01 RX ADMIN — WHITE PETROLATUM 57.7 %-MINERAL OIL 31.9 % EYE OINTMENT: at 00:44

## 2023-01-01 RX ADMIN — BUDESONIDE 500 MCG: 0.5 INHALANT RESPIRATORY (INHALATION) at 07:37

## 2023-01-01 RX ADMIN — BIVALIRUDIN 0.52 MG/KG/HR: 250 INJECTION, POWDER, LYOPHILIZED, FOR SOLUTION INTRAVENOUS at 07:53

## 2023-01-01 RX ADMIN — PROPOFOL 20 MCG/KG/MIN: 10 INJECTION, EMULSION INTRAVENOUS at 15:03

## 2023-01-01 RX ADMIN — BUDESONIDE 500 MCG: 0.5 INHALANT RESPIRATORY (INHALATION) at 07:52

## 2023-01-01 RX ADMIN — NICARDIPINE HYDROCHLORIDE 2.5 MG/HR: 25 INJECTION, SOLUTION INTRAVENOUS at 05:25

## 2023-01-01 RX ADMIN — NYSTATIN 500000 UNITS: 100000 SUSPENSION ORAL at 20:59

## 2023-01-01 RX ADMIN — PANTOPRAZOLE SODIUM 40 MG: 40 INJECTION, POWDER, FOR SOLUTION INTRAVENOUS at 09:25

## 2023-01-01 RX ADMIN — BIVALIRUDIN 0.52 MG/KG/HR: 250 INJECTION, POWDER, LYOPHILIZED, FOR SOLUTION INTRAVENOUS at 03:03

## 2023-01-01 RX ADMIN — DEXMEDETOMIDINE HYDROCHLORIDE 1.5 MCG/KG/HR: 400 INJECTION, SOLUTION INTRAVENOUS at 12:02

## 2023-01-01 RX ADMIN — PIPERACILLIN AND TAZOBACTAM 3375 MG: 3; .375 INJECTION, POWDER, LYOPHILIZED, FOR SOLUTION INTRAVENOUS at 14:00

## 2023-01-01 RX ADMIN — IPRATROPIUM BROMIDE AND ALBUTEROL SULFATE 1 DOSE: 2.5; .5 SOLUTION RESPIRATORY (INHALATION) at 16:04

## 2023-01-01 RX ADMIN — Medication 16 UNITS: at 11:57

## 2023-01-01 RX ADMIN — HYDROMORPHONE HYDROCHLORIDE 1 MG: 1 INJECTION, SOLUTION INTRAMUSCULAR; INTRAVENOUS; SUBCUTANEOUS at 05:45

## 2023-01-01 RX ADMIN — IPRATROPIUM BROMIDE AND ALBUTEROL SULFATE 1 DOSE: 2.5; .5 SOLUTION RESPIRATORY (INHALATION) at 08:46

## 2023-01-01 RX ADMIN — CHLORHEXIDINE GLUCONATE 15 ML: 1.2 RINSE ORAL at 21:53

## 2023-01-01 RX ADMIN — AMIODARONE HYDROCHLORIDE 400 MG: 200 TABLET ORAL at 12:33

## 2023-01-01 RX ADMIN — MIDAZOLAM 1 MG: 1 INJECTION INTRAMUSCULAR; INTRAVENOUS at 21:48

## 2023-01-01 RX ADMIN — VANCOMYCIN HYDROCHLORIDE 750 MG: 750 INJECTION, POWDER, LYOPHILIZED, FOR SOLUTION INTRAVENOUS at 12:43

## 2023-01-01 RX ADMIN — CHLORHEXIDINE GLUCONATE 15 ML: 1.2 RINSE ORAL at 10:26

## 2023-01-01 RX ADMIN — PROPOFOL 45 MCG/KG/MIN: 10 INJECTION, EMULSION INTRAVENOUS at 07:00

## 2023-01-01 RX ADMIN — NYSTATIN 500000 UNITS: 100000 SUSPENSION ORAL at 08:18

## 2023-01-01 RX ADMIN — CHLORHEXIDINE GLUCONATE 15 ML: 1.2 RINSE ORAL at 08:57

## 2023-01-01 RX ADMIN — HYDROMORPHONE HYDROCHLORIDE 1 MG: 1 INJECTION, SOLUTION INTRAMUSCULAR; INTRAVENOUS; SUBCUTANEOUS at 04:33

## 2023-01-01 RX ADMIN — BUDESONIDE 500 MCG: 0.5 INHALANT RESPIRATORY (INHALATION) at 20:18

## 2023-01-01 RX ADMIN — CHLORHEXIDINE GLUCONATE 15 ML: 1.2 RINSE ORAL at 21:14

## 2023-01-01 RX ADMIN — LORAZEPAM 1 MG: 2 INJECTION INTRAMUSCULAR; INTRAVENOUS at 11:50

## 2023-01-01 RX ADMIN — CLONAZEPAM 1 MG: 0.5 TABLET ORAL at 14:51

## 2023-01-01 RX ADMIN — IPRATROPIUM BROMIDE AND ALBUTEROL SULFATE 1 DOSE: 2.5; .5 SOLUTION RESPIRATORY (INHALATION) at 07:58

## 2023-01-01 RX ADMIN — SODIUM CHLORIDE 4.16 UNITS/HR: 9 INJECTION, SOLUTION INTRAVENOUS at 15:27

## 2023-01-01 RX ADMIN — AMIODARONE HYDROCHLORIDE 400 MG: 200 TABLET ORAL at 20:36

## 2023-01-01 RX ADMIN — BUMETANIDE 1 MG: 0.25 INJECTION, SOLUTION INTRAMUSCULAR; INTRAVENOUS at 15:34

## 2023-01-01 RX ADMIN — FUROSEMIDE 60 MG: 10 INJECTION, SOLUTION INTRAMUSCULAR; INTRAVENOUS at 09:13

## 2023-01-01 RX ADMIN — DEXMEDETOMIDINE HYDROCHLORIDE 1.5 MCG/KG/HR: 400 INJECTION, SOLUTION INTRAVENOUS at 15:37

## 2023-01-01 RX ADMIN — VANCOMYCIN HYDROCHLORIDE 750 MG: 750 INJECTION, POWDER, LYOPHILIZED, FOR SOLUTION INTRAVENOUS at 23:12

## 2023-01-01 RX ADMIN — FUROSEMIDE 40 MG: 10 INJECTION, SOLUTION INTRAMUSCULAR; INTRAVENOUS at 13:38

## 2023-01-01 RX ADMIN — BUMETANIDE 1 MG: 0.25 INJECTION INTRAMUSCULAR; INTRAVENOUS at 09:32

## 2023-01-01 RX ADMIN — HYDROMORPHONE HYDROCHLORIDE 1 MG: 1 INJECTION, SOLUTION INTRAMUSCULAR; INTRAVENOUS; SUBCUTANEOUS at 04:30

## 2023-01-01 RX ADMIN — POTASSIUM CHLORIDE 20 MEQ: 400 INJECTION, SOLUTION INTRAVENOUS at 21:00

## 2023-01-01 RX ADMIN — HYDROMORPHONE HYDROCHLORIDE 2 MG: 1 INJECTION, SOLUTION INTRAMUSCULAR; INTRAVENOUS; SUBCUTANEOUS at 11:41

## 2023-01-01 RX ADMIN — MIDAZOLAM 1 MG: 1 INJECTION INTRAMUSCULAR; INTRAVENOUS at 23:06

## 2023-01-01 RX ADMIN — NYSTATIN 500000 UNITS: 100000 SUSPENSION ORAL at 20:39

## 2023-01-01 RX ADMIN — DEXMEDETOMIDINE HYDROCHLORIDE 1.5 MCG/KG/HR: 400 INJECTION, SOLUTION INTRAVENOUS at 22:15

## 2023-01-01 RX ADMIN — PIPERACILLIN AND TAZOBACTAM 3375 MG: 3; .375 INJECTION, POWDER, LYOPHILIZED, FOR SOLUTION INTRAVENOUS at 22:05

## 2023-01-01 RX ADMIN — PROPOFOL 30 MCG/KG/MIN: 10 INJECTION, EMULSION INTRAVENOUS at 08:32

## 2023-01-01 RX ADMIN — IPRATROPIUM BROMIDE AND ALBUTEROL SULFATE 1 DOSE: 2.5; .5 SOLUTION RESPIRATORY (INHALATION) at 13:05

## 2023-01-01 RX ADMIN — AMIODARONE HYDROCHLORIDE 150 MG: 50 INJECTION, SOLUTION INTRAVENOUS at 21:07

## 2023-01-01 RX ADMIN — NICARDIPINE HYDROCHLORIDE 12.5 MG/HR: 25 INJECTION, SOLUTION INTRAVENOUS at 08:39

## 2023-01-01 RX ADMIN — PROPOFOL 45 MCG/KG/MIN: 10 INJECTION, EMULSION INTRAVENOUS at 02:45

## 2023-01-01 RX ADMIN — POTASSIUM CHLORIDE 20 MEQ: 400 INJECTION, SOLUTION INTRAVENOUS at 17:35

## 2023-01-01 RX ADMIN — MIDAZOLAM 1 MG: 1 INJECTION INTRAMUSCULAR; INTRAVENOUS at 21:15

## 2023-01-01 RX ADMIN — VANCOMYCIN HYDROCHLORIDE 750 MG: 750 INJECTION, POWDER, LYOPHILIZED, FOR SOLUTION INTRAVENOUS at 10:36

## 2023-01-01 RX ADMIN — PROPOFOL 30 MCG/KG/MIN: 10 INJECTION, EMULSION INTRAVENOUS at 23:00

## 2023-01-01 RX ADMIN — MEROPENEM 1000 MG: 1 INJECTION, POWDER, FOR SOLUTION INTRAVENOUS at 07:51

## 2023-01-01 RX ADMIN — NYSTATIN 500000 UNITS: 100000 SUSPENSION ORAL at 20:48

## 2023-01-01 RX ADMIN — METRONIDAZOLE 500 MG: 500 INJECTION, SOLUTION INTRAVENOUS at 13:04

## 2023-01-01 RX ADMIN — PANTOPRAZOLE SODIUM 40 MG: 40 INJECTION, POWDER, FOR SOLUTION INTRAVENOUS at 09:05

## 2023-01-01 RX ADMIN — BUMETANIDE 1 MG: 0.25 INJECTION, SOLUTION INTRAMUSCULAR; INTRAVENOUS at 16:15

## 2023-01-01 RX ADMIN — IPRATROPIUM BROMIDE AND ALBUTEROL SULFATE 1 DOSE: 2.5; .5 SOLUTION RESPIRATORY (INHALATION) at 11:51

## 2023-01-01 RX ADMIN — IPRATROPIUM BROMIDE AND ALBUTEROL SULFATE 1 DOSE: 2.5; .5 SOLUTION RESPIRATORY (INHALATION) at 11:07

## 2023-01-01 RX ADMIN — HYDROMORPHONE HYDROCHLORIDE 1 MG: 1 INJECTION, SOLUTION INTRAMUSCULAR; INTRAVENOUS; SUBCUTANEOUS at 23:05

## 2023-01-01 RX ADMIN — CHLORHEXIDINE GLUCONATE 15 ML: 1.2 RINSE ORAL at 09:00

## 2023-01-01 RX ADMIN — BUMETANIDE 1 MG: 0.25 INJECTION, SOLUTION INTRAMUSCULAR; INTRAVENOUS at 07:53

## 2023-01-01 RX ADMIN — IPRATROPIUM BROMIDE AND ALBUTEROL SULFATE 1 DOSE: 2.5; .5 SOLUTION RESPIRATORY (INHALATION) at 19:43

## 2023-01-01 RX ADMIN — POLYETHYLENE GLYCOL 3350 17 G: 17 POWDER, FOR SOLUTION ORAL at 08:19

## 2023-01-01 RX ADMIN — Medication: at 21:00

## 2023-01-01 RX ADMIN — POTASSIUM BICARBONATE 40 MEQ: 782 TABLET, EFFERVESCENT ORAL at 20:49

## 2023-01-01 RX ADMIN — BUDESONIDE 500 MCG: 0.5 INHALANT RESPIRATORY (INHALATION) at 07:51

## 2023-01-01 RX ADMIN — MUPIROCIN: 20 OINTMENT TOPICAL at 08:55

## 2023-01-01 RX ADMIN — PROPOFOL 20 MCG/KG/MIN: 10 INJECTION, EMULSION INTRAVENOUS at 22:47

## 2023-01-01 RX ADMIN — IPRATROPIUM BROMIDE AND ALBUTEROL SULFATE 1 DOSE: 2.5; .5 SOLUTION RESPIRATORY (INHALATION) at 19:58

## 2023-01-01 RX ADMIN — MIDAZOLAM 1 MG: 1 INJECTION INTRAMUSCULAR; INTRAVENOUS at 05:51

## 2023-01-01 RX ADMIN — QUETIAPINE FUMARATE 50 MG: 25 TABLET ORAL at 20:43

## 2023-01-01 RX ADMIN — QUETIAPINE FUMARATE 50 MG: 25 TABLET ORAL at 20:44

## 2023-01-01 RX ADMIN — PROPOFOL 30 MCG/KG/MIN: 10 INJECTION, EMULSION INTRAVENOUS at 22:00

## 2023-01-01 RX ADMIN — PANTOPRAZOLE SODIUM 40 MG: 40 INJECTION, POWDER, FOR SOLUTION INTRAVENOUS at 18:28

## 2023-01-01 RX ADMIN — HYDRALAZINE HYDROCHLORIDE 10 MG: 20 INJECTION, SOLUTION INTRAMUSCULAR; INTRAVENOUS at 21:32

## 2023-01-01 RX ADMIN — POTASSIUM CHLORIDE 20 MEQ: 400 INJECTION, SOLUTION INTRAVENOUS at 17:46

## 2023-01-01 RX ADMIN — FUROSEMIDE 40 MG: 10 INJECTION, SOLUTION INTRAMUSCULAR; INTRAVENOUS at 00:54

## 2023-01-01 RX ADMIN — CALCIUM CHLORIDE 1000 MG: 100 INJECTION, SOLUTION INTRAVENOUS at 17:33

## 2023-01-01 RX ADMIN — MIDAZOLAM 2 MG: 1 INJECTION INTRAMUSCULAR; INTRAVENOUS at 17:00

## 2023-01-01 RX ADMIN — BIVALIRUDIN 0.43 MG/KG/HR: 250 INJECTION, POWDER, LYOPHILIZED, FOR SOLUTION INTRAVENOUS at 20:52

## 2023-01-01 RX ADMIN — NOREPINEPHRINE BITARTRATE 5 MCG/MIN: 1 INJECTION, SOLUTION, CONCENTRATE INTRAVENOUS at 15:12

## 2023-01-01 RX ADMIN — SODIUM CHLORIDE, PRESERVATIVE FREE 10 ML: 5 INJECTION INTRAVENOUS at 20:00

## 2023-01-01 RX ADMIN — SODIUM CHLORIDE, PRESERVATIVE FREE 10 ML: 5 INJECTION INTRAVENOUS at 20:45

## 2023-01-01 RX ADMIN — CEFEPIME 2000 MG: 2 INJECTION, POWDER, FOR SOLUTION INTRAVENOUS at 03:41

## 2023-01-01 RX ADMIN — AMIODARONE HYDROCHLORIDE 400 MG: 200 TABLET ORAL at 20:44

## 2023-01-01 RX ADMIN — VANCOMYCIN HYDROCHLORIDE 750 MG: 750 INJECTION, POWDER, LYOPHILIZED, FOR SOLUTION INTRAVENOUS at 08:55

## 2023-01-01 RX ADMIN — MIDAZOLAM 1 MG: 1 INJECTION INTRAMUSCULAR; INTRAVENOUS at 18:29

## 2023-01-01 RX ADMIN — AMIODARONE HYDROCHLORIDE 1 MG/MIN: 50 INJECTION, SOLUTION INTRAVENOUS at 11:55

## 2023-01-01 RX ADMIN — MIDAZOLAM 1 MG: 1 INJECTION INTRAMUSCULAR; INTRAVENOUS at 05:02

## 2023-01-01 RX ADMIN — HYDROMORPHONE HYDROCHLORIDE 0.5 MG: 1 INJECTION, SOLUTION INTRAMUSCULAR; INTRAVENOUS; SUBCUTANEOUS at 21:45

## 2023-01-01 RX ADMIN — INSULIN HUMAN 15 UNITS: 100 INJECTION, SUSPENSION SUBCUTANEOUS at 11:57

## 2023-01-01 RX ADMIN — MIDAZOLAM 1 MG: 1 INJECTION INTRAMUSCULAR; INTRAVENOUS at 03:45

## 2023-01-01 RX ADMIN — POTASSIUM CHLORIDE 20 MEQ: 29.8 INJECTION, SOLUTION INTRAVENOUS at 18:00

## 2023-01-01 RX ADMIN — MIDAZOLAM 1 MG: 1 INJECTION INTRAMUSCULAR; INTRAVENOUS at 23:23

## 2023-01-01 RX ADMIN — BIVALIRUDIN 0.52 MG/KG/HR: 250 INJECTION, POWDER, LYOPHILIZED, FOR SOLUTION INTRAVENOUS at 23:58

## 2023-01-01 RX ADMIN — PROPOFOL 30 MCG/KG/MIN: 10 INJECTION, EMULSION INTRAVENOUS at 21:33

## 2023-01-01 RX ADMIN — NYSTATIN 500000 UNITS: 100000 SUSPENSION ORAL at 13:00

## 2023-01-01 RX ADMIN — HYDROMORPHONE HYDROCHLORIDE 0.5 MG/HR: 10 INJECTION, SOLUTION INTRAMUSCULAR; INTRAVENOUS; SUBCUTANEOUS at 11:07

## 2023-01-01 RX ADMIN — Medication 16 UNITS: at 00:20

## 2023-01-01 RX ADMIN — Medication: at 21:34

## 2023-01-01 RX ADMIN — MIDAZOLAM 1 MG: 1 INJECTION INTRAMUSCULAR; INTRAVENOUS at 09:07

## 2023-01-01 RX ADMIN — ACETAZOLAMIDE SODIUM 500 MG: 500 INJECTION, POWDER, LYOPHILIZED, FOR SOLUTION INTRAVENOUS at 09:29

## 2023-01-01 RX ADMIN — DEXMEDETOMIDINE HYDROCHLORIDE 1.5 MCG/KG/HR: 400 INJECTION, SOLUTION INTRAVENOUS at 14:25

## 2023-01-01 RX ADMIN — SODIUM CHLORIDE, PRESERVATIVE FREE 10 ML: 5 INJECTION INTRAVENOUS at 20:32

## 2023-01-01 RX ADMIN — WHITE PETROLATUM 57.7 %-MINERAL OIL 31.9 % EYE OINTMENT: at 20:44

## 2023-01-01 RX ADMIN — VANCOMYCIN HYDROCHLORIDE 1250 MG: 1.25 INJECTION, POWDER, LYOPHILIZED, FOR SOLUTION INTRAVENOUS at 12:05

## 2023-01-01 RX ADMIN — DEXMEDETOMIDINE HYDROCHLORIDE 1.5 MCG/KG/HR: 400 INJECTION, SOLUTION INTRAVENOUS at 11:00

## 2023-01-01 RX ADMIN — QUETIAPINE FUMARATE 50 MG: 25 TABLET ORAL at 21:11

## 2023-01-01 RX ADMIN — POLYETHYLENE GLYCOL 3350 17 G: 17 POWDER, FOR SOLUTION ORAL at 08:55

## 2023-01-01 RX ADMIN — MIDAZOLAM 1 MG: 1 INJECTION INTRAMUSCULAR; INTRAVENOUS at 21:00

## 2023-01-01 RX ADMIN — CHLORHEXIDINE GLUCONATE 15 ML: 1.2 RINSE ORAL at 08:22

## 2023-01-01 RX ADMIN — CHLORHEXIDINE GLUCONATE 15 ML: 1.2 RINSE ORAL at 09:15

## 2023-01-01 RX ADMIN — NICARDIPINE HYDROCHLORIDE 10 MG/HR: 25 INJECTION, SOLUTION INTRAVENOUS at 14:47

## 2023-01-01 RX ADMIN — WATER 2000 MG: 1 INJECTION INTRAMUSCULAR; INTRAVENOUS; SUBCUTANEOUS at 22:06

## 2023-01-01 RX ADMIN — MIDAZOLAM 1 MG: 1 INJECTION INTRAMUSCULAR; INTRAVENOUS at 19:30

## 2023-01-01 RX ADMIN — SODIUM CHLORIDE, PRESERVATIVE FREE 10 ML: 5 INJECTION INTRAVENOUS at 21:53

## 2023-01-01 RX ADMIN — DEXMEDETOMIDINE HYDROCHLORIDE 1.5 MCG/KG/HR: 400 INJECTION, SOLUTION INTRAVENOUS at 02:45

## 2023-01-01 RX ADMIN — DEXMEDETOMIDINE HYDROCHLORIDE 1.5 MCG/KG/HR: 400 INJECTION, SOLUTION INTRAVENOUS at 01:56

## 2023-01-01 RX ADMIN — SODIUM CHLORIDE, PRESERVATIVE FREE 10 ML: 5 INJECTION INTRAVENOUS at 21:03

## 2023-01-01 RX ADMIN — IPRATROPIUM BROMIDE AND ALBUTEROL SULFATE 1 DOSE: 2.5; .5 SOLUTION RESPIRATORY (INHALATION) at 20:33

## 2023-01-01 RX ADMIN — HYDROMORPHONE HYDROCHLORIDE 1 MG: 1 INJECTION, SOLUTION INTRAMUSCULAR; INTRAVENOUS; SUBCUTANEOUS at 09:56

## 2023-01-01 RX ADMIN — HYDROMORPHONE HYDROCHLORIDE 1 MG: 1 INJECTION, SOLUTION INTRAMUSCULAR; INTRAVENOUS; SUBCUTANEOUS at 16:04

## 2023-01-01 RX ADMIN — ALBUMIN (HUMAN) 12.5 G: 12.5 INJECTION, SOLUTION INTRAVENOUS at 10:39

## 2023-01-01 RX ADMIN — AMIODARONE HYDROCHLORIDE 1 MG/MIN: 50 INJECTION, SOLUTION INTRAVENOUS at 19:56

## 2023-01-01 RX ADMIN — BUDESONIDE 500 MCG: 0.5 INHALANT RESPIRATORY (INHALATION) at 08:12

## 2023-01-01 RX ADMIN — DEXMEDETOMIDINE HYDROCHLORIDE 1.2 MCG/KG/HR: 400 INJECTION, SOLUTION INTRAVENOUS at 12:35

## 2023-01-01 RX ADMIN — HYDROMORPHONE HYDROCHLORIDE 1 MG/HR: 10 INJECTION, SOLUTION INTRAMUSCULAR; INTRAVENOUS; SUBCUTANEOUS at 15:37

## 2023-01-01 RX ADMIN — SENNOSIDES AND DOCUSATE SODIUM 1 TABLET: 8.6; 5 TABLET ORAL at 09:38

## 2023-01-01 RX ADMIN — POTASSIUM CHLORIDE 20 MEQ: 400 INJECTION, SOLUTION INTRAVENOUS at 07:15

## 2023-01-01 RX ADMIN — POTASSIUM CHLORIDE 20 MEQ: 400 INJECTION, SOLUTION INTRAVENOUS at 16:40

## 2023-01-01 RX ADMIN — CHLOROTHIAZIDE SODIUM 250 MG: 500 INJECTION, POWDER, LYOPHILIZED, FOR SOLUTION INTRAVENOUS at 11:38

## 2023-01-01 RX ADMIN — NYSTATIN 500000 UNITS: 100000 SUSPENSION ORAL at 17:25

## 2023-01-01 RX ADMIN — Medication: at 08:19

## 2023-01-01 RX ADMIN — PIPERACILLIN AND TAZOBACTAM 3375 MG: 3; .375 INJECTION, POWDER, LYOPHILIZED, FOR SOLUTION INTRAVENOUS at 22:17

## 2023-01-01 RX ADMIN — QUETIAPINE FUMARATE 50 MG: 25 TABLET ORAL at 08:30

## 2023-01-01 RX ADMIN — SODIUM CHLORIDE, PRESERVATIVE FREE 10 ML: 5 INJECTION INTRAVENOUS at 21:13

## 2023-01-01 RX ADMIN — ALBUMIN (HUMAN) 12.5 G: 12.5 INJECTION, SOLUTION INTRAVENOUS at 16:45

## 2023-01-01 RX ADMIN — PANTOPRAZOLE SODIUM 40 MG: 40 INJECTION, POWDER, FOR SOLUTION INTRAVENOUS at 08:52

## 2023-01-01 RX ADMIN — POTASSIUM CHLORIDE 20 MEQ: 400 INJECTION, SOLUTION INTRAVENOUS at 06:00

## 2023-01-01 RX ADMIN — Medication 16 UNITS: at 12:06

## 2023-01-01 RX ADMIN — DEXMEDETOMIDINE HYDROCHLORIDE 1.5 MCG/KG/HR: 400 INJECTION, SOLUTION INTRAVENOUS at 05:27

## 2023-01-01 RX ADMIN — DEXMEDETOMIDINE HYDROCHLORIDE 0.9 MCG/KG/HR: 400 INJECTION, SOLUTION INTRAVENOUS at 22:36

## 2023-01-01 RX ADMIN — NYSTATIN 500000 UNITS: 100000 SUSPENSION ORAL at 16:59

## 2023-01-01 RX ADMIN — DEXMEDETOMIDINE HYDROCHLORIDE 1.5 MCG/KG/HR: 400 INJECTION, SOLUTION INTRAVENOUS at 22:44

## 2023-01-01 RX ADMIN — QUETIAPINE FUMARATE 50 MG: 25 TABLET ORAL at 09:02

## 2023-01-01 RX ADMIN — MIDAZOLAM 1 MG: 1 INJECTION INTRAMUSCULAR; INTRAVENOUS at 21:57

## 2023-01-01 RX ADMIN — BIVALIRUDIN 0.52 MG/KG/HR: 250 INJECTION, POWDER, LYOPHILIZED, FOR SOLUTION INTRAVENOUS at 15:40

## 2023-01-01 RX ADMIN — DEXMEDETOMIDINE HYDROCHLORIDE 1.2 MCG/KG/HR: 400 INJECTION, SOLUTION INTRAVENOUS at 08:32

## 2023-01-01 RX ADMIN — VASOPRESSIN 0.04 UNITS/MIN: 20 INJECTION INTRAVENOUS at 23:00

## 2023-01-01 RX ADMIN — DEXMEDETOMIDINE HYDROCHLORIDE 1.5 MCG/KG/HR: 400 INJECTION, SOLUTION INTRAVENOUS at 12:14

## 2023-01-01 RX ADMIN — CLONAZEPAM 1 MG: 0.5 TABLET ORAL at 09:49

## 2023-01-01 RX ADMIN — HYDROMORPHONE HYDROCHLORIDE 1 MG: 1 INJECTION, SOLUTION INTRAMUSCULAR; INTRAVENOUS; SUBCUTANEOUS at 20:27

## 2023-01-01 RX ADMIN — NYSTATIN 500000 UNITS: 100000 SUSPENSION ORAL at 20:36

## 2023-01-01 RX ADMIN — BUDESONIDE 500 MCG: 0.5 INHALANT RESPIRATORY (INHALATION) at 20:33

## 2023-01-01 RX ADMIN — POTASSIUM CHLORIDE 20 MEQ: 400 INJECTION, SOLUTION INTRAVENOUS at 19:03

## 2023-01-01 RX ADMIN — MIDAZOLAM 1 MG: 1 INJECTION INTRAMUSCULAR; INTRAVENOUS at 11:14

## 2023-01-01 RX ADMIN — CHLORHEXIDINE GLUCONATE 15 ML: 1.2 RINSE ORAL at 20:48

## 2023-01-01 RX ADMIN — AMIODARONE HYDROCHLORIDE 0.5 MG/MIN: 50 INJECTION, SOLUTION INTRAVENOUS at 05:00

## 2023-01-01 RX ADMIN — DEXMEDETOMIDINE HYDROCHLORIDE 1.5 MCG/KG/HR: 400 INJECTION, SOLUTION INTRAVENOUS at 08:31

## 2023-01-01 RX ADMIN — CHLOROTHIAZIDE SODIUM 500 MG: 500 INJECTION, POWDER, LYOPHILIZED, FOR SOLUTION INTRAVENOUS at 08:54

## 2023-01-01 RX ADMIN — AMIODARONE HYDROCHLORIDE 0.5 MG/MIN: 50 INJECTION, SOLUTION INTRAVENOUS at 16:30

## 2023-01-01 RX ADMIN — AMIODARONE HYDROCHLORIDE 1 MG/MIN: 50 INJECTION, SOLUTION INTRAVENOUS at 04:56

## 2023-01-01 RX ADMIN — TOBRAMYCIN 300 MG: 300 SOLUTION ORAL at 07:52

## 2023-01-01 RX ADMIN — PROPOFOL 20 MCG/KG/MIN: 10 INJECTION, EMULSION INTRAVENOUS at 16:35

## 2023-01-01 RX ADMIN — POLYETHYLENE GLYCOL 3350 17 G: 17 POWDER, FOR SOLUTION ORAL at 09:12

## 2023-01-01 RX ADMIN — VANCOMYCIN HYDROCHLORIDE 750 MG: 750 INJECTION, POWDER, LYOPHILIZED, FOR SOLUTION INTRAVENOUS at 10:52

## 2023-01-01 RX ADMIN — NICARDIPINE HYDROCHLORIDE 15 MG/HR: 25 INJECTION, SOLUTION INTRAVENOUS at 04:25

## 2023-01-01 RX ADMIN — MIDAZOLAM 1 MG: 1 INJECTION INTRAMUSCULAR; INTRAVENOUS at 07:53

## 2023-01-01 RX ADMIN — NOREPINEPHRINE BITARTRATE 21 MCG/MIN: 1 INJECTION, SOLUTION, CONCENTRATE INTRAVENOUS at 04:00

## 2023-01-01 RX ADMIN — Medication 200 MCG/HR: at 12:30

## 2023-01-01 RX ADMIN — PROPOFOL 45 MCG/KG/MIN: 10 INJECTION, EMULSION INTRAVENOUS at 12:15

## 2023-01-01 RX ADMIN — BIVALIRUDIN 0.43 MG/KG/HR: 250 INJECTION, POWDER, LYOPHILIZED, FOR SOLUTION INTRAVENOUS at 08:15

## 2023-01-01 RX ADMIN — PROPOFOL 45 MCG/KG/MIN: 10 INJECTION, EMULSION INTRAVENOUS at 20:00

## 2023-01-01 RX ADMIN — NICARDIPINE HYDROCHLORIDE 7.5 MG/HR: 25 INJECTION, SOLUTION INTRAVENOUS at 15:48

## 2023-01-01 RX ADMIN — HYDROMORPHONE HYDROCHLORIDE 2.2 MG/HR: 10 INJECTION, SOLUTION INTRAMUSCULAR; INTRAVENOUS; SUBCUTANEOUS at 20:04

## 2023-01-01 RX ADMIN — NYSTATIN 500000 UNITS: 100000 SUSPENSION ORAL at 12:18

## 2023-01-01 RX ADMIN — BIVALIRUDIN 0.78 MG/KG/HR: 250 INJECTION, POWDER, LYOPHILIZED, FOR SOLUTION INTRAVENOUS at 07:56

## 2023-01-01 RX ADMIN — POTASSIUM CHLORIDE 20 MEQ: 400 INJECTION, SOLUTION INTRAVENOUS at 07:30

## 2023-01-01 RX ADMIN — HYDROMORPHONE HYDROCHLORIDE 1 MG: 1 INJECTION, SOLUTION INTRAMUSCULAR; INTRAVENOUS; SUBCUTANEOUS at 07:32

## 2023-01-01 RX ADMIN — HEPARIN SODIUM 5000 UNITS: 5000 INJECTION INTRAVENOUS; SUBCUTANEOUS at 14:05

## 2023-01-01 RX ADMIN — PANTOPRAZOLE SODIUM 40 MG: 40 INJECTION, POWDER, FOR SOLUTION INTRAVENOUS at 08:21

## 2023-01-01 RX ADMIN — PIPERACILLIN AND TAZOBACTAM 3375 MG: 3; .375 INJECTION, POWDER, LYOPHILIZED, FOR SOLUTION INTRAVENOUS at 05:59

## 2023-01-01 RX ADMIN — DEXMEDETOMIDINE HYDROCHLORIDE 1 MCG/KG/HR: 400 INJECTION, SOLUTION INTRAVENOUS at 18:57

## 2023-01-01 RX ADMIN — BIVALIRUDIN 0.52 MG/KG/HR: 250 INJECTION, POWDER, LYOPHILIZED, FOR SOLUTION INTRAVENOUS at 13:34

## 2023-01-01 RX ADMIN — POTASSIUM CHLORIDE 20 MEQ: 400 INJECTION, SOLUTION INTRAVENOUS at 08:25

## 2023-01-01 RX ADMIN — Medication: at 07:57

## 2023-01-01 RX ADMIN — WATER: 1 INJECTION INTRAMUSCULAR; INTRAVENOUS; SUBCUTANEOUS at 09:26

## 2023-01-01 RX ADMIN — HYDROMORPHONE HYDROCHLORIDE 0.8 MG/HR: 10 INJECTION, SOLUTION INTRAMUSCULAR; INTRAVENOUS; SUBCUTANEOUS at 01:00

## 2023-01-01 RX ADMIN — AMIODARONE HYDROCHLORIDE 400 MG: 200 TABLET ORAL at 08:19

## 2023-01-01 RX ADMIN — ACETYLCYSTEINE 600 MG: 200 INHALANT RESPIRATORY (INHALATION) at 20:04

## 2023-01-01 RX ADMIN — IPRATROPIUM BROMIDE AND ALBUTEROL SULFATE 1 DOSE: 2.5; .5 SOLUTION RESPIRATORY (INHALATION) at 08:48

## 2023-01-01 RX ADMIN — Medication 6 MG: at 00:43

## 2023-01-01 RX ADMIN — HYDROMORPHONE HYDROCHLORIDE 2 MG: 2 TABLET ORAL at 18:17

## 2023-01-01 RX ADMIN — TOBRAMYCIN 300 MG: 300 SOLUTION ORAL at 08:00

## 2023-01-01 RX ADMIN — Medication: at 21:24

## 2023-01-01 RX ADMIN — BIVALIRUDIN 0.43 MG/KG/HR: 250 INJECTION, POWDER, LYOPHILIZED, FOR SOLUTION INTRAVENOUS at 13:08

## 2023-01-01 RX ADMIN — BUDESONIDE 500 MCG: 0.5 INHALANT RESPIRATORY (INHALATION) at 19:43

## 2023-01-01 RX ADMIN — QUETIAPINE FUMARATE 50 MG: 25 TABLET ORAL at 20:31

## 2023-01-01 RX ADMIN — MEROPENEM 1000 MG: 1 INJECTION, POWDER, FOR SOLUTION INTRAVENOUS at 15:59

## 2023-01-01 RX ADMIN — BIVALIRUDIN 0.02 MG/KG/HR: 250 INJECTION, POWDER, LYOPHILIZED, FOR SOLUTION INTRAVENOUS at 15:49

## 2023-01-01 RX ADMIN — ETOMIDATE 15 MG: 2 INJECTION, SOLUTION INTRAVENOUS at 10:53

## 2023-01-01 RX ADMIN — HYDROMORPHONE HYDROCHLORIDE 2.5 MG/HR: 10 INJECTION, SOLUTION INTRAMUSCULAR; INTRAVENOUS; SUBCUTANEOUS at 02:30

## 2023-01-01 RX ADMIN — HYDROMORPHONE HYDROCHLORIDE 1 MG: 1 INJECTION, SOLUTION INTRAMUSCULAR; INTRAVENOUS; SUBCUTANEOUS at 05:05

## 2023-01-01 RX ADMIN — MUPIROCIN: 20 OINTMENT TOPICAL at 20:43

## 2023-01-01 RX ADMIN — KETAMINE HYDROCHLORIDE 0.25 MG/KG/HR: 100 INJECTION INTRAMUSCULAR; INTRAVENOUS at 19:26

## 2023-01-01 RX ADMIN — DEXMEDETOMIDINE HYDROCHLORIDE 1.5 MCG/KG/HR: 400 INJECTION, SOLUTION INTRAVENOUS at 14:24

## 2023-01-01 RX ADMIN — IPRATROPIUM BROMIDE AND ALBUTEROL SULFATE 1 DOSE: 2.5; .5 SOLUTION RESPIRATORY (INHALATION) at 11:33

## 2023-01-01 RX ADMIN — HYDROMORPHONE HYDROCHLORIDE 1 MG: 1 INJECTION, SOLUTION INTRAMUSCULAR; INTRAVENOUS; SUBCUTANEOUS at 09:57

## 2023-01-01 RX ADMIN — HYDROMORPHONE HYDROCHLORIDE 1 MG: 1 INJECTION, SOLUTION INTRAMUSCULAR; INTRAVENOUS; SUBCUTANEOUS at 23:30

## 2023-01-01 RX ADMIN — SODIUM CHLORIDE, PRESERVATIVE FREE 10 ML: 5 INJECTION INTRAVENOUS at 09:55

## 2023-01-01 RX ADMIN — DEXMEDETOMIDINE HYDROCHLORIDE 1.5 MCG/KG/HR: 400 INJECTION, SOLUTION INTRAVENOUS at 09:36

## 2023-01-01 RX ADMIN — DEXMEDETOMIDINE HYDROCHLORIDE 0.9 MCG/KG/HR: 400 INJECTION, SOLUTION INTRAVENOUS at 06:28

## 2023-01-01 RX ADMIN — WHITE PETROLATUM 57.7 %-MINERAL OIL 31.9 % EYE OINTMENT: at 04:44

## 2023-01-01 RX ADMIN — PROPOFOL 45 MCG/KG/MIN: 10 INJECTION, EMULSION INTRAVENOUS at 03:00

## 2023-01-01 RX ADMIN — IPRATROPIUM BROMIDE AND ALBUTEROL SULFATE 1 DOSE: 2.5; .5 SOLUTION RESPIRATORY (INHALATION) at 11:48

## 2023-01-01 RX ADMIN — BUMETANIDE 1 MG: 0.25 INJECTION INTRAMUSCULAR; INTRAVENOUS at 07:22

## 2023-01-01 RX ADMIN — Medication: at 20:43

## 2023-01-01 RX ADMIN — MIDAZOLAM 1 MG: 1 INJECTION INTRAMUSCULAR; INTRAVENOUS at 06:36

## 2023-01-01 RX ADMIN — PIPERACILLIN AND TAZOBACTAM 4500 MG: 4; .5 INJECTION, POWDER, LYOPHILIZED, FOR SOLUTION INTRAVENOUS at 15:47

## 2023-01-01 RX ADMIN — AMIODARONE HYDROCHLORIDE 1 MG/MIN: 50 INJECTION, SOLUTION INTRAVENOUS at 14:36

## 2023-01-01 RX ADMIN — FAMOTIDINE 20 MG: 20 TABLET, FILM COATED ORAL at 11:14

## 2023-01-01 RX ADMIN — WHITE PETROLATUM 57.7 %-MINERAL OIL 31.9 % EYE OINTMENT: at 20:30

## 2023-01-01 RX ADMIN — Medication: at 09:07

## 2023-01-01 RX ADMIN — Medication 16 UNITS: at 11:55

## 2023-01-01 RX ADMIN — BUDESONIDE 500 MCG: 0.5 INHALANT RESPIRATORY (INHALATION) at 08:46

## 2023-01-01 RX ADMIN — POTASSIUM BICARBONATE 40 MEQ: 782 TABLET, EFFERVESCENT ORAL at 08:33

## 2023-01-01 RX ADMIN — MIDAZOLAM 2 MG: 1 INJECTION INTRAMUSCULAR; INTRAVENOUS at 17:45

## 2023-01-01 RX ADMIN — CHLORHEXIDINE GLUCONATE 15 ML: 1.2 RINSE ORAL at 20:09

## 2023-01-01 RX ADMIN — MUPIROCIN: 20 OINTMENT TOPICAL at 10:42

## 2023-01-01 RX ADMIN — PIPERACILLIN AND TAZOBACTAM 3375 MG: 3; .375 INJECTION, POWDER, LYOPHILIZED, FOR SOLUTION INTRAVENOUS at 22:59

## 2023-01-01 RX ADMIN — HYDRALAZINE HYDROCHLORIDE 10 MG: 20 INJECTION, SOLUTION INTRAMUSCULAR; INTRAVENOUS at 20:51

## 2023-01-01 RX ADMIN — HYDROMORPHONE HYDROCHLORIDE 1 MG: 1 INJECTION, SOLUTION INTRAMUSCULAR; INTRAVENOUS; SUBCUTANEOUS at 00:35

## 2023-01-01 RX ADMIN — HYDROMORPHONE HYDROCHLORIDE 2 MG: 1 INJECTION, SOLUTION INTRAMUSCULAR; INTRAVENOUS; SUBCUTANEOUS at 10:49

## 2023-01-01 RX ADMIN — EPINEPHRINE 4 MCG/MIN: 1 INJECTION INTRAMUSCULAR; INTRAVENOUS; SUBCUTANEOUS at 21:07

## 2023-01-01 RX ADMIN — SODIUM CHLORIDE 21.4 UNITS/HR: 9 INJECTION, SOLUTION INTRAVENOUS at 05:48

## 2023-01-01 RX ADMIN — HYDROMORPHONE HYDROCHLORIDE 1 MG: 1 INJECTION, SOLUTION INTRAMUSCULAR; INTRAVENOUS; SUBCUTANEOUS at 23:24

## 2023-01-01 RX ADMIN — VASOPRESSIN 0.04 UNITS/MIN: 20 INJECTION INTRAVENOUS at 14:24

## 2023-01-01 RX ADMIN — DEXMEDETOMIDINE HYDROCHLORIDE 1.5 MCG/KG/HR: 400 INJECTION, SOLUTION INTRAVENOUS at 22:03

## 2023-01-01 RX ADMIN — DEXMEDETOMIDINE HYDROCHLORIDE 1.5 MCG/KG/HR: 400 INJECTION, SOLUTION INTRAVENOUS at 20:00

## 2023-01-01 RX ADMIN — DEXMEDETOMIDINE HYDROCHLORIDE 1.5 MCG/KG/HR: 400 INJECTION, SOLUTION INTRAVENOUS at 08:42

## 2023-01-01 RX ADMIN — WATER 2000 MG: 1 INJECTION INTRAMUSCULAR; INTRAVENOUS; SUBCUTANEOUS at 21:32

## 2023-01-01 RX ADMIN — POTASSIUM BICARBONATE 40 MEQ: 782 TABLET, EFFERVESCENT ORAL at 07:30

## 2023-01-01 RX ADMIN — SODIUM CHLORIDE, PRESERVATIVE FREE 10 ML: 5 INJECTION INTRAVENOUS at 20:30

## 2023-01-01 RX ADMIN — DEXTROSE MONOHYDRATE: 50 INJECTION, SOLUTION INTRAVENOUS at 07:23

## 2023-01-01 RX ADMIN — HYDROMORPHONE HYDROCHLORIDE 3.5 MG/HR: 10 INJECTION, SOLUTION INTRAMUSCULAR; INTRAVENOUS; SUBCUTANEOUS at 13:33

## 2023-01-01 RX ADMIN — PHENYLEPHRINE HYDROCHLORIDE 100 MCG/MIN: 10 INJECTION INTRAVENOUS at 19:56

## 2023-01-01 RX ADMIN — SENNOSIDES AND DOCUSATE SODIUM 1 TABLET: 8.6; 5 TABLET ORAL at 08:55

## 2023-01-01 RX ADMIN — IPRATROPIUM BROMIDE AND ALBUTEROL SULFATE 1 DOSE: 2.5; .5 SOLUTION RESPIRATORY (INHALATION) at 11:58

## 2023-01-01 RX ADMIN — CHLOROTHIAZIDE SODIUM 250 MG: 500 INJECTION, POWDER, LYOPHILIZED, FOR SOLUTION INTRAVENOUS at 19:11

## 2023-01-01 RX ADMIN — Medication 100 MCG/HR: at 16:28

## 2023-01-01 RX ADMIN — BUMETANIDE 1 MG: 0.25 INJECTION, SOLUTION INTRAMUSCULAR; INTRAVENOUS at 23:49

## 2023-01-01 RX ADMIN — BIVALIRUDIN 0.52 MG/KG/HR: 250 INJECTION, POWDER, LYOPHILIZED, FOR SOLUTION INTRAVENOUS at 03:18

## 2023-01-01 RX ADMIN — EPINEPHRINE 7 MCG/MIN: 1 INJECTION INTRAMUSCULAR; INTRAVENOUS; SUBCUTANEOUS at 11:52

## 2023-01-01 RX ADMIN — BUDESONIDE 500 MCG: 0.5 INHALANT RESPIRATORY (INHALATION) at 08:55

## 2023-01-01 RX ADMIN — NYSTATIN 500000 UNITS: 100000 SUSPENSION ORAL at 11:57

## 2023-01-01 RX ADMIN — HYDROMORPHONE HYDROCHLORIDE 1 MG: 1 INJECTION, SOLUTION INTRAMUSCULAR; INTRAVENOUS; SUBCUTANEOUS at 22:00

## 2023-01-01 RX ADMIN — DEXMEDETOMIDINE HYDROCHLORIDE 1.5 MCG/KG/HR: 400 INJECTION, SOLUTION INTRAVENOUS at 02:30

## 2023-01-01 RX ADMIN — HYDRALAZINE HYDROCHLORIDE 10 MG: 20 INJECTION, SOLUTION INTRAMUSCULAR; INTRAVENOUS at 23:10

## 2023-01-01 RX ADMIN — NYSTATIN 500000 UNITS: 100000 SUSPENSION ORAL at 13:35

## 2023-01-01 RX ADMIN — AMIODARONE HYDROCHLORIDE 150 MG: 50 INJECTION, SOLUTION INTRAVENOUS at 22:19

## 2023-01-01 RX ADMIN — BIVALIRUDIN 0.52 MG/KG/HR: 250 INJECTION, POWDER, LYOPHILIZED, FOR SOLUTION INTRAVENOUS at 11:31

## 2023-01-01 RX ADMIN — CHLORHEXIDINE GLUCONATE 15 ML: 1.2 RINSE ORAL at 22:16

## 2023-01-01 RX ADMIN — HEPARIN SODIUM 5000 UNITS: 5000 INJECTION INTRAVENOUS; SUBCUTANEOUS at 06:00

## 2023-01-01 RX ADMIN — Medication 50 MCG/HR: at 20:45

## 2023-01-01 RX ADMIN — HYDROMORPHONE HYDROCHLORIDE 1 MG: 1 INJECTION, SOLUTION INTRAMUSCULAR; INTRAVENOUS; SUBCUTANEOUS at 06:11

## 2023-01-01 RX ADMIN — NICARDIPINE HYDROCHLORIDE 10 MG/HR: 25 INJECTION, SOLUTION INTRAVENOUS at 19:25

## 2023-01-01 RX ADMIN — DEXMEDETOMIDINE HYDROCHLORIDE 1.5 MCG/KG/HR: 400 INJECTION, SOLUTION INTRAVENOUS at 10:37

## 2023-01-01 RX ADMIN — DIPHENHYDRAMINE HYDROCHLORIDE 25 MG: 25 CAPSULE ORAL at 03:47

## 2023-01-01 RX ADMIN — VASOPRESSIN: 20 INJECTION, SOLUTION INTRAVENOUS at 17:39

## 2023-01-01 RX ADMIN — FAMOTIDINE 20 MG: 20 TABLET, FILM COATED ORAL at 08:55

## 2023-01-01 RX ADMIN — CLONAZEPAM 1 MG: 0.5 TABLET ORAL at 18:00

## 2023-01-01 RX ADMIN — MEROPENEM 1000 MG: 1 INJECTION, POWDER, FOR SOLUTION INTRAVENOUS at 08:17

## 2023-01-01 RX ADMIN — CHLOROTHIAZIDE SODIUM 250 MG: 500 INJECTION, POWDER, LYOPHILIZED, FOR SOLUTION INTRAVENOUS at 18:49

## 2023-01-01 RX ADMIN — METRONIDAZOLE 500 MG: 500 INJECTION, SOLUTION INTRAVENOUS at 21:35

## 2023-01-01 RX ADMIN — MIDAZOLAM 1 MG: 1 INJECTION INTRAMUSCULAR; INTRAVENOUS at 05:14

## 2023-01-01 RX ADMIN — DEXMEDETOMIDINE HYDROCHLORIDE 1.5 MCG/KG/HR: 400 INJECTION, SOLUTION INTRAVENOUS at 07:22

## 2023-01-01 RX ADMIN — Medication: at 09:22

## 2023-01-01 RX ADMIN — MEROPENEM 1000 MG: 1 INJECTION, POWDER, FOR SOLUTION INTRAVENOUS at 15:56

## 2023-01-01 RX ADMIN — MICAFUNGIN SODIUM 150 MG: 100 INJECTION, POWDER, LYOPHILIZED, FOR SOLUTION INTRAVENOUS at 10:00

## 2023-01-01 RX ADMIN — IPRATROPIUM BROMIDE AND ALBUTEROL SULFATE 1 DOSE: 2.5; .5 SOLUTION RESPIRATORY (INHALATION) at 16:54

## 2023-01-01 RX ADMIN — DEXMEDETOMIDINE HYDROCHLORIDE 1 MCG/KG/HR: 400 INJECTION, SOLUTION INTRAVENOUS at 11:12

## 2023-01-01 RX ADMIN — Medication: at 20:40

## 2023-01-01 RX ADMIN — VANCOMYCIN HYDROCHLORIDE 750 MG: 750 INJECTION, POWDER, LYOPHILIZED, FOR SOLUTION INTRAVENOUS at 11:41

## 2023-01-01 RX ADMIN — BUDESONIDE 500 MCG: 0.5 INHALANT RESPIRATORY (INHALATION) at 07:36

## 2023-01-01 RX ADMIN — IPRATROPIUM BROMIDE AND ALBUTEROL SULFATE 1 DOSE: 2.5; .5 SOLUTION RESPIRATORY (INHALATION) at 16:15

## 2023-01-01 RX ADMIN — VANCOMYCIN HYDROCHLORIDE 1750 MG: 10 INJECTION, POWDER, LYOPHILIZED, FOR SOLUTION INTRAVENOUS at 21:01

## 2023-01-01 RX ADMIN — METRONIDAZOLE 500 MG: 500 INJECTION, SOLUTION INTRAVENOUS at 05:10

## 2023-01-01 RX ADMIN — BUMETANIDE 2 MG: 0.25 INJECTION INTRAMUSCULAR; INTRAVENOUS at 00:00

## 2023-01-01 RX ADMIN — PHENYLEPHRINE HYDROCHLORIDE 50 MCG/MIN: 10 INJECTION INTRAVENOUS at 10:45

## 2023-01-01 RX ADMIN — IPRATROPIUM BROMIDE AND ALBUTEROL SULFATE 1 DOSE: 2.5; .5 SOLUTION RESPIRATORY (INHALATION) at 15:47

## 2023-01-01 RX ADMIN — POTASSIUM CHLORIDE 20 MEQ: 400 INJECTION, SOLUTION INTRAVENOUS at 10:42

## 2023-01-01 RX ADMIN — HYDROMORPHONE HYDROCHLORIDE 1 MG: 1 INJECTION, SOLUTION INTRAMUSCULAR; INTRAVENOUS; SUBCUTANEOUS at 10:58

## 2023-01-01 RX ADMIN — SODIUM CHLORIDE, PRESERVATIVE FREE 10 ML: 5 INJECTION INTRAVENOUS at 20:53

## 2023-01-01 RX ADMIN — INSULIN GLARGINE 15 UNITS: 100 INJECTION, SOLUTION SUBCUTANEOUS at 19:17

## 2023-01-01 RX ADMIN — POTASSIUM CHLORIDE 20 MEQ: 400 INJECTION, SOLUTION INTRAVENOUS at 18:57

## 2023-01-01 RX ADMIN — METRONIDAZOLE 500 MG: 500 INJECTION, SOLUTION INTRAVENOUS at 05:52

## 2023-01-01 RX ADMIN — DOBUTAMINE HYDROCHLORIDE 2.5 MCG/KG/MIN: 200 INJECTION INTRAVENOUS at 06:59

## 2023-01-01 RX ADMIN — VASOPRESSIN 0.04 UNITS/MIN: 20 INJECTION INTRAVENOUS at 08:26

## 2023-01-01 RX ADMIN — PROPOFOL 45 MCG/KG/MIN: 10 INJECTION, EMULSION INTRAVENOUS at 19:05

## 2023-01-01 RX ADMIN — METRONIDAZOLE 500 MG: 500 INJECTION, SOLUTION INTRAVENOUS at 13:35

## 2023-01-01 RX ADMIN — MUPIROCIN: 20 OINTMENT TOPICAL at 21:32

## 2023-01-01 RX ADMIN — BUDESONIDE 500 MCG: 0.5 INHALANT RESPIRATORY (INHALATION) at 07:27

## 2023-01-01 RX ADMIN — POTASSIUM CHLORIDE 20 MEQ: 400 INJECTION, SOLUTION INTRAVENOUS at 19:45

## 2023-01-01 RX ADMIN — Medication: at 08:45

## 2023-01-01 RX ADMIN — DEXMEDETOMIDINE HYDROCHLORIDE 1.5 MCG/KG/HR: 400 INJECTION, SOLUTION INTRAVENOUS at 15:56

## 2023-01-01 RX ADMIN — PROPOFOL 45 MCG/KG/MIN: 10 INJECTION, EMULSION INTRAVENOUS at 07:30

## 2023-01-01 RX ADMIN — AMIODARONE HYDROCHLORIDE 400 MG: 200 TABLET ORAL at 08:44

## 2023-01-01 RX ADMIN — PROPOFOL 50 MCG/KG/MIN: 10 INJECTION, EMULSION INTRAVENOUS at 10:07

## 2023-01-01 RX ADMIN — NICARDIPINE HYDROCHLORIDE 5 MG/HR: 25 INJECTION, SOLUTION INTRAVENOUS at 07:13

## 2023-01-01 RX ADMIN — DEXMEDETOMIDINE HYDROCHLORIDE 1.5 MCG/KG/HR: 400 INJECTION, SOLUTION INTRAVENOUS at 12:09

## 2023-01-01 RX ADMIN — WHITE PETROLATUM 57.7 %-MINERAL OIL 31.9 % EYE OINTMENT: at 15:08

## 2023-01-01 RX ADMIN — POTASSIUM BICARBONATE 40 MEQ: 782 TABLET, EFFERVESCENT ORAL at 09:13

## 2023-01-01 RX ADMIN — MIDAZOLAM 0.5 MG: 1 INJECTION INTRAMUSCULAR; INTRAVENOUS at 16:27

## 2023-01-01 RX ADMIN — HYDROMORPHONE HYDROCHLORIDE 1 MG: 1 INJECTION, SOLUTION INTRAMUSCULAR; INTRAVENOUS; SUBCUTANEOUS at 14:55

## 2023-01-01 RX ADMIN — MIDAZOLAM 1 MG: 1 INJECTION INTRAMUSCULAR; INTRAVENOUS at 00:59

## 2023-01-01 RX ADMIN — MIDAZOLAM 1 MG: 1 INJECTION INTRAMUSCULAR; INTRAVENOUS at 08:59

## 2023-01-01 RX ADMIN — HEPARIN SODIUM 5000 UNITS: 5000 INJECTION INTRAVENOUS; SUBCUTANEOUS at 06:15

## 2023-01-01 RX ADMIN — PIPERACILLIN AND TAZOBACTAM 3375 MG: 3; .375 INJECTION, POWDER, LYOPHILIZED, FOR SOLUTION INTRAVENOUS at 05:27

## 2023-01-01 RX ADMIN — CHLOROTHIAZIDE SODIUM 250 MG: 500 INJECTION, POWDER, LYOPHILIZED, FOR SOLUTION INTRAVENOUS at 07:56

## 2023-01-01 RX ADMIN — QUETIAPINE FUMARATE 50 MG: 25 TABLET ORAL at 08:21

## 2023-01-01 RX ADMIN — Medication 100 MCG/HR: at 10:50

## 2023-01-01 RX ADMIN — BUDESONIDE 500 MCG: 0.5 INHALANT RESPIRATORY (INHALATION) at 08:18

## 2023-01-01 RX ADMIN — HYDROMORPHONE HYDROCHLORIDE 1 MG: 1 INJECTION, SOLUTION INTRAMUSCULAR; INTRAVENOUS; SUBCUTANEOUS at 09:23

## 2023-01-01 RX ADMIN — IPRATROPIUM BROMIDE AND ALBUTEROL SULFATE 1 DOSE: 2.5; .5 SOLUTION RESPIRATORY (INHALATION) at 08:41

## 2023-01-01 RX ADMIN — POTASSIUM CHLORIDE 20 MEQ: 400 INJECTION, SOLUTION INTRAVENOUS at 07:35

## 2023-01-01 RX ADMIN — BUDESONIDE 500 MCG: 0.5 INHALANT RESPIRATORY (INHALATION) at 08:00

## 2023-01-01 RX ADMIN — NYSTATIN 500000 UNITS: 100000 SUSPENSION ORAL at 18:22

## 2023-01-01 RX ADMIN — MIDAZOLAM 1 MG: 1 INJECTION INTRAMUSCULAR; INTRAVENOUS at 04:56

## 2023-01-01 RX ADMIN — NICARDIPINE HYDROCHLORIDE 5 MG/HR: 25 INJECTION, SOLUTION INTRAVENOUS at 01:30

## 2023-01-01 RX ADMIN — BIVALIRUDIN 0.52 MG/KG/HR: 250 INJECTION, POWDER, LYOPHILIZED, FOR SOLUTION INTRAVENOUS at 06:50

## 2023-01-01 RX ADMIN — FAMOTIDINE 20 MG: 10 INJECTION INTRAVENOUS at 20:40

## 2023-01-01 RX ADMIN — PROPOFOL 30 MCG/KG/MIN: 10 INJECTION, EMULSION INTRAVENOUS at 02:30

## 2023-01-01 RX ADMIN — AMIODARONE HYDROCHLORIDE 0.5 MG/MIN: 50 INJECTION, SOLUTION INTRAVENOUS at 02:32

## 2023-01-01 RX ADMIN — HYDROMORPHONE HYDROCHLORIDE 1 MG: 1 INJECTION, SOLUTION INTRAMUSCULAR; INTRAVENOUS; SUBCUTANEOUS at 05:21

## 2023-01-01 RX ADMIN — CEFEPIME 2000 MG: 2 INJECTION, POWDER, FOR SOLUTION INTRAVENOUS at 12:09

## 2023-01-01 RX ADMIN — Medication: at 08:15

## 2023-01-01 RX ADMIN — NYSTATIN 500000 UNITS: 100000 SUSPENSION ORAL at 20:44

## 2023-01-01 RX ADMIN — MAGNESIUM OXIDE TAB 400 MG (241.3 MG ELEMENTAL MG) 400 MG: 400 (241.3 MG) TAB at 21:10

## 2023-01-01 RX ADMIN — GLYCOPYRROLATE 0.2 MG: 0.2 INJECTION INTRAMUSCULAR; INTRAVENOUS at 11:00

## 2023-01-01 RX ADMIN — TOBRAMYCIN 300 MG: 300 SOLUTION ORAL at 19:48

## 2023-01-01 RX ADMIN — VANCOMYCIN HYDROCHLORIDE 1000 MG: 1 INJECTION, POWDER, LYOPHILIZED, FOR SOLUTION INTRAVENOUS at 21:35

## 2023-01-01 RX ADMIN — IPRATROPIUM BROMIDE AND ALBUTEROL SULFATE 1 DOSE: 2.5; .5 SOLUTION RESPIRATORY (INHALATION) at 07:08

## 2023-01-01 RX ADMIN — POTASSIUM CHLORIDE 20 MEQ: 400 INJECTION, SOLUTION INTRAVENOUS at 12:18

## 2023-01-01 RX ADMIN — DEXMEDETOMIDINE HYDROCHLORIDE 1.5 MCG/KG/HR: 400 INJECTION, SOLUTION INTRAVENOUS at 15:36

## 2023-01-01 RX ADMIN — NYSTATIN 500000 UNITS: 100000 SUSPENSION ORAL at 13:11

## 2023-01-01 RX ADMIN — SODIUM CHLORIDE, PRESERVATIVE FREE 10 ML: 5 INJECTION INTRAVENOUS at 20:13

## 2023-01-01 RX ADMIN — HYDROMORPHONE HYDROCHLORIDE 1 MG: 1 INJECTION, SOLUTION INTRAMUSCULAR; INTRAVENOUS; SUBCUTANEOUS at 21:30

## 2023-01-01 RX ADMIN — NOREPINEPHRINE BITARTRATE 50 MCG/MIN: 1 INJECTION, SOLUTION, CONCENTRATE INTRAVENOUS at 22:10

## 2023-01-01 RX ADMIN — IPRATROPIUM BROMIDE AND ALBUTEROL SULFATE 1 DOSE: 2.5; .5 SOLUTION RESPIRATORY (INHALATION) at 11:46

## 2023-01-01 RX ADMIN — DEXMEDETOMIDINE HYDROCHLORIDE 1.5 MCG/KG/HR: 400 INJECTION, SOLUTION INTRAVENOUS at 17:47

## 2023-01-01 RX ADMIN — Medication 200 MCG/HR: at 07:16

## 2023-01-01 RX ADMIN — SODIUM CHLORIDE 7 UNITS/HR: 9 INJECTION, SOLUTION INTRAVENOUS at 23:14

## 2023-01-01 RX ADMIN — BIVALIRUDIN 0.52 MG/KG/HR: 250 INJECTION, POWDER, LYOPHILIZED, FOR SOLUTION INTRAVENOUS at 08:32

## 2023-01-01 RX ADMIN — Medication 16 UNITS: at 12:09

## 2023-01-01 RX ADMIN — FAMOTIDINE 20 MG: 10 INJECTION INTRAVENOUS at 21:09

## 2023-01-01 RX ADMIN — PROPOFOL 40 MCG/KG/MIN: 10 INJECTION, EMULSION INTRAVENOUS at 22:00

## 2023-01-01 RX ADMIN — CHLORHEXIDINE GLUCONATE 15 ML: 1.2 RINSE ORAL at 20:19

## 2023-01-01 RX ADMIN — Medication: at 21:45

## 2023-01-01 RX ADMIN — ACETYLCYSTEINE 600 MG: 200 INHALANT RESPIRATORY (INHALATION) at 19:58

## 2023-01-01 RX ADMIN — FUROSEMIDE 20 MG: 10 INJECTION, SOLUTION INTRAMUSCULAR; INTRAVENOUS at 17:02

## 2023-01-01 RX ADMIN — BIVALIRUDIN 0.78 MG/KG/HR: 250 INJECTION, POWDER, LYOPHILIZED, FOR SOLUTION INTRAVENOUS at 20:56

## 2023-01-01 RX ADMIN — VANCOMYCIN HYDROCHLORIDE 750 MG: 750 INJECTION, POWDER, LYOPHILIZED, FOR SOLUTION INTRAVENOUS at 23:15

## 2023-01-01 RX ADMIN — MIDAZOLAM 1 MG: 1 INJECTION INTRAMUSCULAR; INTRAVENOUS at 04:17

## 2023-01-01 RX ADMIN — BUMETANIDE 1 MG: 0.25 INJECTION, SOLUTION INTRAMUSCULAR; INTRAVENOUS at 15:59

## 2023-01-01 RX ADMIN — SODIUM CHLORIDE: 4.5 INJECTION, SOLUTION INTRAVENOUS at 00:45

## 2023-01-01 RX ADMIN — IPRATROPIUM BROMIDE AND ALBUTEROL SULFATE 1 DOSE: 2.5; .5 SOLUTION RESPIRATORY (INHALATION) at 08:19

## 2023-01-01 RX ADMIN — HYDROMORPHONE HYDROCHLORIDE 1 MG: 1 INJECTION, SOLUTION INTRAMUSCULAR; INTRAVENOUS; SUBCUTANEOUS at 01:23

## 2023-01-01 RX ADMIN — AMIODARONE HYDROCHLORIDE 0.5 MG/MIN: 50 INJECTION, SOLUTION INTRAVENOUS at 06:59

## 2023-01-01 RX ADMIN — NICARDIPINE HYDROCHLORIDE 7.5 MG/HR: 25 INJECTION, SOLUTION INTRAVENOUS at 15:36

## 2023-01-01 RX ADMIN — HYDROMORPHONE HYDROCHLORIDE 1 MG: 1 INJECTION, SOLUTION INTRAMUSCULAR; INTRAVENOUS; SUBCUTANEOUS at 23:29

## 2023-01-01 RX ADMIN — NYSTATIN 500000 UNITS: 100000 SUSPENSION ORAL at 08:50

## 2023-01-01 RX ADMIN — DEXMEDETOMIDINE HYDROCHLORIDE 1.5 MCG/KG/HR: 400 INJECTION, SOLUTION INTRAVENOUS at 05:00

## 2023-01-01 RX ADMIN — AMIODARONE HYDROCHLORIDE 150 MG: 50 INJECTION, SOLUTION INTRAVENOUS at 11:12

## 2023-01-01 RX ADMIN — DEXMEDETOMIDINE HYDROCHLORIDE 1.5 MCG/KG/HR: 400 INJECTION, SOLUTION INTRAVENOUS at 13:17

## 2023-01-01 RX ADMIN — NYSTATIN 500000 UNITS: 100000 SUSPENSION ORAL at 21:50

## 2023-01-01 RX ADMIN — PIPERACILLIN AND TAZOBACTAM 3375 MG: 3; .375 INJECTION, POWDER, LYOPHILIZED, FOR SOLUTION INTRAVENOUS at 06:04

## 2023-01-01 RX ADMIN — POTASSIUM CHLORIDE 20 MEQ: 29.8 INJECTION, SOLUTION INTRAVENOUS at 19:16

## 2023-01-01 RX ADMIN — PIPERACILLIN AND TAZOBACTAM 3375 MG: 3; .375 INJECTION, POWDER, LYOPHILIZED, FOR SOLUTION INTRAVENOUS at 21:19

## 2023-01-01 RX ADMIN — INSULIN HUMAN 15 UNITS: 100 INJECTION, SUSPENSION SUBCUTANEOUS at 00:06

## 2023-01-01 RX ADMIN — PROPOFOL 45 MCG/KG/MIN: 10 INJECTION, EMULSION INTRAVENOUS at 00:48

## 2023-01-01 RX ADMIN — PROPOFOL 45 MCG/KG/MIN: 10 INJECTION, EMULSION INTRAVENOUS at 02:00

## 2023-01-01 RX ADMIN — POTASSIUM CHLORIDE 20 MEQ: 400 INJECTION, SOLUTION INTRAVENOUS at 00:30

## 2023-01-01 RX ADMIN — SENNOSIDES AND DOCUSATE SODIUM 1 TABLET: 8.6; 5 TABLET ORAL at 08:19

## 2023-01-01 RX ADMIN — PROPOFOL 45 MCG/KG/MIN: 10 INJECTION, EMULSION INTRAVENOUS at 06:21

## 2023-01-01 RX ADMIN — FUROSEMIDE 60 MG: 10 INJECTION, SOLUTION INTRAMUSCULAR; INTRAVENOUS at 08:52

## 2023-01-01 RX ADMIN — NOREPINEPHRINE BITARTRATE 8 MCG/MIN: 1 INJECTION, SOLUTION, CONCENTRATE INTRAVENOUS at 05:00

## 2023-01-01 RX ADMIN — VASOPRESSIN 0.04 UNITS/MIN: 20 INJECTION INTRAVENOUS at 09:29

## 2023-01-01 ASSESSMENT — PULMONARY FUNCTION TESTS
PIF_VALUE: 14
PIF_VALUE: 21
PIF_VALUE: 23
PIF_VALUE: 17
PIF_VALUE: 13
PIF_VALUE: 16
PIF_VALUE: 17
PIF_VALUE: 13
PIF_VALUE: 17
PIF_VALUE: 13
PEFR_L/MIN: 100
PIF_VALUE: 17
PIF_VALUE: 25
PIF_VALUE: 14
PIF_VALUE: 16
PIF_VALUE: 23
PIF_VALUE: 17
PIF_VALUE: 28
PIF_VALUE: 24
PIF_VALUE: 23
PIF_VALUE: 21
PIF_VALUE: 17
PIF_VALUE: 13
PIF_VALUE: 13
PIF_VALUE: 17
PIF_VALUE: 17
PIF_VALUE: 13
PIF_VALUE: 13
PIF_VALUE: 26
PIF_VALUE: 13
PIF_VALUE: 14
PIF_VALUE: 13
PIF_VALUE: 21
PIF_VALUE: 17
PIF_VALUE: 25
PIF_VALUE: 16
PIF_VALUE: 25
PIF_VALUE: 18
PIF_VALUE: 21
PIF_VALUE: 17
PIF_VALUE: 13
PIF_VALUE: 22
PEFR_L/MIN: 91
PIF_VALUE: 24
PIF_VALUE: 14
PIF_VALUE: 21
PIF_VALUE: 16
PIF_VALUE: 13
PIF_VALUE: 26
PIF_VALUE: 15
PIF_VALUE: 16
PIF_VALUE: 16
PIF_VALUE: 14
PIF_VALUE: 13
PIF_VALUE: 17
PIF_VALUE: 22
PIF_VALUE: 17
PIF_VALUE: 13
PIF_VALUE: 13
PIF_VALUE: 26
PIF_VALUE: 17
PIF_VALUE: 13
PIF_VALUE: 14
PIF_VALUE: 13
PEFR_L/MIN: 22
PIF_VALUE: 13
PIF_VALUE: 13
PEFR_L/MIN: 100
PIF_VALUE: 13
PIF_VALUE: 17
PIF_VALUE: 24
PIF_VALUE: 16
PIF_VALUE: 16
PIF_VALUE: 14
PIF_VALUE: 25
PIF_VALUE: 13
PIF_VALUE: 18
PIF_VALUE: 16
PIF_VALUE: 13
PIF_VALUE: 13
PIF_VALUE: 16
PIF_VALUE: 18
PIF_VALUE: 13
PIF_VALUE: 16

## 2023-01-01 ASSESSMENT — PAIN SCALES - GENERAL
PAINLEVEL_OUTOF10: 0
PAINLEVEL_OUTOF10: 1
PAINLEVEL_OUTOF10: 0
PAINLEVEL_OUTOF10: 0
PAINLEVEL_OUTOF10: 3
PAINLEVEL_OUTOF10: 0
PAINLEVEL_OUTOF10: 1
PAINLEVEL_OUTOF10: 0
PAINLEVEL_OUTOF10: 10
PAINLEVEL_OUTOF10: 10
PAINLEVEL_OUTOF10: 0
PAINLEVEL_OUTOF10: 3
PAINLEVEL_OUTOF10: 0
PAINLEVEL_OUTOF10: 7
PAINLEVEL_OUTOF10: 5
PAINLEVEL_OUTOF10: 8
PAINLEVEL_OUTOF10: 1
PAINLEVEL_OUTOF10: 0
PAINLEVEL_OUTOF10: 8
PAINLEVEL_OUTOF10: 10
PAINLEVEL_OUTOF10: 0

## 2023-01-01 ASSESSMENT — LIFESTYLE VARIABLES
HOW OFTEN DO YOU HAVE A DRINK CONTAINING ALCOHOL: NEVER
HOW MANY STANDARD DRINKS CONTAINING ALCOHOL DO YOU HAVE ON A TYPICAL DAY: PATIENT DOES NOT DRINK

## 2023-01-01 ASSESSMENT — PAIN - FUNCTIONAL ASSESSMENT: PAIN_FUNCTIONAL_ASSESSMENT: 0-10

## 2023-11-09 ENCOUNTER — APPOINTMENT (OUTPATIENT)
Facility: HOSPITAL | Age: 48
End: 2023-11-09

## 2023-11-09 ENCOUNTER — HOSPITAL ENCOUNTER (EMERGENCY)
Facility: HOSPITAL | Age: 48
Discharge: ANOTHER HOSPITAL | End: 2023-11-10
Attending: EMERGENCY MEDICINE

## 2023-11-09 DIAGNOSIS — I71.010 TYPE 1 DISSECTION OF ASCENDING AORTA (HCC): Primary | ICD-10-CM

## 2023-11-09 LAB
ANION GAP SERPL CALC-SCNC: 9 MMOL/L (ref 5–15)
BASOPHILS # BLD: 0 K/UL (ref 0–0.1)
BASOPHILS NFR BLD: 0 % (ref 0–1)
BUN SERPL-MCNC: 9 MG/DL (ref 6–20)
BUN/CREAT SERPL: 7 (ref 12–20)
CALCIUM SERPL-MCNC: 9.2 MG/DL (ref 8.5–10.1)
CHLORIDE SERPL-SCNC: 102 MMOL/L (ref 97–108)
CO2 SERPL-SCNC: 29 MMOL/L (ref 21–32)
CREAT SERPL-MCNC: 1.28 MG/DL (ref 0.7–1.3)
DIFFERENTIAL METHOD BLD: ABNORMAL
EKG ATRIAL RATE: 60 BPM
EKG ATRIAL RATE: 73 BPM
EKG DIAGNOSIS: NORMAL
EKG DIAGNOSIS: NORMAL
EKG P AXIS: 39 DEGREES
EKG P-R INTERVAL: 160 MS
EKG Q-T INTERVAL: 426 MS
EKG Q-T INTERVAL: 474 MS
EKG QRS DURATION: 158 MS
EKG QRS DURATION: 162 MS
EKG QTC CALCULATION (BAZETT): 453 MS
EKG QTC CALCULATION (BAZETT): 469 MS
EKG R AXIS: -8 DEGREES
EKG R AXIS: 50 DEGREES
EKG T AXIS: 23 DEGREES
EKG T AXIS: 6 DEGREES
EKG VENTRICULAR RATE: 55 BPM
EKG VENTRICULAR RATE: 73 BPM
EOSINOPHIL # BLD: 0.2 K/UL (ref 0–0.4)
EOSINOPHIL NFR BLD: 2 % (ref 0–7)
ERYTHROCYTE [DISTWIDTH] IN BLOOD BY AUTOMATED COUNT: 13.9 % (ref 11.5–14.5)
GLUCOSE SERPL-MCNC: 120 MG/DL (ref 65–100)
HCT VFR BLD AUTO: 48.1 % (ref 36.6–50.3)
HGB BLD-MCNC: 15.7 G/DL (ref 12.1–17)
IMM GRANULOCYTES # BLD AUTO: 0.1 K/UL (ref 0–0.04)
IMM GRANULOCYTES NFR BLD AUTO: 1 % (ref 0–0.5)
LYMPHOCYTES # BLD: 3.4 K/UL (ref 0.8–3.5)
LYMPHOCYTES NFR BLD: 35 % (ref 12–49)
MCH RBC QN AUTO: 28.2 PG (ref 26–34)
MCHC RBC AUTO-ENTMCNC: 32.6 G/DL (ref 30–36.5)
MCV RBC AUTO: 86.4 FL (ref 80–99)
MONOCYTES # BLD: 0.5 K/UL (ref 0–1)
MONOCYTES NFR BLD: 5 % (ref 5–13)
NEUTS SEG # BLD: 5.7 K/UL (ref 1.8–8)
NEUTS SEG NFR BLD: 57 % (ref 32–75)
NRBC # BLD: 0 K/UL (ref 0–0.01)
NRBC BLD-RTO: 0 PER 100 WBC
PLATELET # BLD AUTO: 180 K/UL (ref 150–400)
PMV BLD AUTO: 10.9 FL (ref 8.9–12.9)
POTASSIUM SERPL-SCNC: 3.7 MMOL/L (ref 3.5–5.1)
RBC # BLD AUTO: 5.57 M/UL (ref 4.1–5.7)
SODIUM SERPL-SCNC: 140 MMOL/L (ref 136–145)
TROPONIN I SERPL HS-MCNC: 8 NG/L (ref 0–76)
WBC # BLD AUTO: 10 K/UL (ref 4.1–11.1)

## 2023-11-09 PROCEDURE — 84484 ASSAY OF TROPONIN QUANT: CPT

## 2023-11-09 PROCEDURE — 36415 COLL VENOUS BLD VENIPUNCTURE: CPT

## 2023-11-09 PROCEDURE — 80048 BASIC METABOLIC PNL TOTAL CA: CPT

## 2023-11-09 PROCEDURE — 96375 TX/PRO/DX INJ NEW DRUG ADDON: CPT

## 2023-11-09 PROCEDURE — 93005 ELECTROCARDIOGRAM TRACING: CPT | Performed by: EMERGENCY MEDICINE

## 2023-11-09 PROCEDURE — 85025 COMPLETE CBC W/AUTO DIFF WBC: CPT

## 2023-11-09 PROCEDURE — 71045 X-RAY EXAM CHEST 1 VIEW: CPT

## 2023-11-09 PROCEDURE — 96365 THER/PROPH/DIAG IV INF INIT: CPT

## 2023-11-09 PROCEDURE — 99285 EMERGENCY DEPT VISIT HI MDM: CPT

## 2023-11-09 PROCEDURE — 6360000002 HC RX W HCPCS: Performed by: EMERGENCY MEDICINE

## 2023-11-09 RX ORDER — KETOROLAC TROMETHAMINE 30 MG/ML
15 INJECTION, SOLUTION INTRAMUSCULAR; INTRAVENOUS
Status: COMPLETED | OUTPATIENT
Start: 2023-11-09 | End: 2023-11-09

## 2023-11-09 RX ADMIN — KETOROLAC TROMETHAMINE 15 MG: 30 INJECTION, SOLUTION INTRAMUSCULAR; INTRAVENOUS at 22:53

## 2023-11-09 ASSESSMENT — PAIN SCALES - GENERAL
PAINLEVEL_OUTOF10: 7
PAINLEVEL_OUTOF10: 8

## 2023-11-09 ASSESSMENT — PAIN - FUNCTIONAL ASSESSMENT: PAIN_FUNCTIONAL_ASSESSMENT: 0-10

## 2023-11-09 ASSESSMENT — PAIN DESCRIPTION - PAIN TYPE: TYPE: ACUTE PAIN

## 2023-11-09 ASSESSMENT — PAIN DESCRIPTION - ORIENTATION
ORIENTATION: MID
ORIENTATION: MID

## 2023-11-09 ASSESSMENT — PAIN DESCRIPTION - DESCRIPTORS
DESCRIPTORS: ACHING
DESCRIPTORS: TIGHTNESS

## 2023-11-09 ASSESSMENT — PAIN DESCRIPTION - LOCATION
LOCATION: CHEST
LOCATION: CHEST

## 2023-11-10 ENCOUNTER — APPOINTMENT (OUTPATIENT)
Facility: HOSPITAL | Age: 48
End: 2023-11-10

## 2023-11-10 ENCOUNTER — ANESTHESIA (OUTPATIENT)
Facility: HOSPITAL | Age: 48
End: 2023-11-10

## 2023-11-10 ENCOUNTER — HOSPITAL ENCOUNTER (OUTPATIENT)
Facility: HOSPITAL | Age: 48
Discharge: HOME | End: 2023-11-12
Attending: THORACIC SURGERY (CARDIOTHORACIC VASCULAR SURGERY)

## 2023-11-10 ENCOUNTER — ANESTHESIA EVENT (OUTPATIENT)
Facility: HOSPITAL | Age: 48
End: 2023-11-10

## 2023-11-10 VITALS
HEART RATE: 80 BPM | HEIGHT: 73 IN | WEIGHT: 170 LBS | TEMPERATURE: 97.6 F | RESPIRATION RATE: 18 BRPM | BODY MASS INDEX: 22.53 KG/M2 | DIASTOLIC BLOOD PRESSURE: 83 MMHG | SYSTOLIC BLOOD PRESSURE: 146 MMHG | OXYGEN SATURATION: 97 %

## 2023-11-10 PROBLEM — Z98.890 S/P ASCENDING AORTIC ANEURYSM REPAIR: Status: ACTIVE | Noted: 2023-11-10

## 2023-11-10 PROBLEM — I71.011 AORTIC ARCH DISSECTION (HCC): Status: ACTIVE | Noted: 2023-11-10

## 2023-11-10 PROBLEM — I71.00 AORTIC DISSECTION (HCC): Status: ACTIVE | Noted: 2023-11-10

## 2023-11-10 PROBLEM — Z95.2 S/P AORTIC VALVE REPLACEMENT: Status: ACTIVE | Noted: 2023-11-10

## 2023-11-10 PROBLEM — Z86.79 S/P ASCENDING AORTIC ANEURYSM REPAIR: Status: ACTIVE | Noted: 2023-11-10

## 2023-11-10 LAB — TROPONIN I SERPL HS-MCNC: 8 NG/L (ref 0–76)

## 2023-11-10 PROCEDURE — 94640 AIRWAY INHALATION TREATMENT: CPT

## 2023-11-10 PROCEDURE — 2580000003 HC RX 258: Performed by: NURSE ANESTHETIST, CERTIFIED REGISTERED

## 2023-11-10 PROCEDURE — P9045 ALBUMIN (HUMAN), 5%, 250 ML: HCPCS | Performed by: NURSE ANESTHETIST, CERTIFIED REGISTERED

## 2023-11-10 PROCEDURE — 6360000002 HC RX W HCPCS: Performed by: NURSE ANESTHETIST, CERTIFIED REGISTERED

## 2023-11-10 PROCEDURE — 6370000000 HC RX 637 (ALT 250 FOR IP): Performed by: NURSE ANESTHETIST, CERTIFIED REGISTERED

## 2023-11-10 PROCEDURE — 2500000003 HC RX 250 WO HCPCS: Performed by: NURSE ANESTHETIST, CERTIFIED REGISTERED

## 2023-11-10 PROCEDURE — 2580000003 HC RX 258: Performed by: THORACIC SURGERY (CARDIOTHORACIC VASCULAR SURGERY)

## 2023-11-10 PROCEDURE — 6360000002 HC RX W HCPCS: Performed by: THORACIC SURGERY (CARDIOTHORACIC VASCULAR SURGERY)

## 2023-11-10 PROCEDURE — 2500000003 HC RX 250 WO HCPCS: Performed by: EMERGENCY MEDICINE

## 2023-11-10 PROCEDURE — 2580000003 HC RX 258: Performed by: PHYSICIAN ASSISTANT

## 2023-11-10 PROCEDURE — 6360000002 HC RX W HCPCS: Performed by: EMERGENCY MEDICINE

## 2023-11-10 PROCEDURE — 71275 CT ANGIOGRAPHY CHEST: CPT

## 2023-11-10 PROCEDURE — 6360000004 HC RX CONTRAST MEDICATION: Performed by: EMERGENCY MEDICINE

## 2023-11-10 PROCEDURE — 6370000000 HC RX 637 (ALT 250 FOR IP)

## 2023-11-10 PROCEDURE — 2580000003 HC RX 258: Performed by: EMERGENCY MEDICINE

## 2023-11-10 RX ORDER — HEPARIN SODIUM 1000 [USP'U]/ML
INJECTION, SOLUTION INTRAVENOUS; SUBCUTANEOUS PRN
Status: DISCONTINUED | OUTPATIENT
Start: 2023-11-10 | End: 2023-11-10 | Stop reason: SDUPTHER

## 2023-11-10 RX ORDER — FENTANYL CITRATE 50 UG/ML
INJECTION, SOLUTION INTRAMUSCULAR; INTRAVENOUS PRN
Status: DISCONTINUED | OUTPATIENT
Start: 2023-11-10 | End: 2023-11-10 | Stop reason: SDUPTHER

## 2023-11-10 RX ORDER — 0.9 % SODIUM CHLORIDE 0.9 %
1000 INTRAVENOUS SOLUTION INTRAVENOUS ONCE
Status: COMPLETED | OUTPATIENT
Start: 2023-11-10 | End: 2023-11-10

## 2023-11-10 RX ORDER — PROPOFOL 10 MG/ML
INJECTION, EMULSION INTRAVENOUS PRN
Status: DISCONTINUED | OUTPATIENT
Start: 2023-11-10 | End: 2023-11-10 | Stop reason: SDUPTHER

## 2023-11-10 RX ORDER — AMIODARONE HYDROCHLORIDE 50 MG/ML
INJECTION, SOLUTION INTRAVENOUS PRN
Status: DISCONTINUED | OUTPATIENT
Start: 2023-11-10 | End: 2023-11-10 | Stop reason: SDUPTHER

## 2023-11-10 RX ORDER — METOPROLOL TARTRATE 5 MG/5ML
5 INJECTION INTRAVENOUS ONCE
Status: COMPLETED | OUTPATIENT
Start: 2023-11-10 | End: 2023-11-10

## 2023-11-10 RX ORDER — MANNITOL 20 G/100ML
INJECTION, SOLUTION INTRAVENOUS PRN
Status: DISCONTINUED | OUTPATIENT
Start: 2023-11-10 | End: 2023-11-10 | Stop reason: SDUPTHER

## 2023-11-10 RX ORDER — FENTANYL CITRATE 50 UG/ML
75 INJECTION, SOLUTION INTRAMUSCULAR; INTRAVENOUS
Status: COMPLETED | OUTPATIENT
Start: 2023-11-10 | End: 2023-11-10

## 2023-11-10 RX ORDER — DEXMEDETOMIDINE HYDROCHLORIDE 4 UG/ML
INJECTION, SOLUTION INTRAVENOUS CONTINUOUS PRN
Status: DISCONTINUED | OUTPATIENT
Start: 2023-11-10 | End: 2023-11-10 | Stop reason: SDUPTHER

## 2023-11-10 RX ORDER — CALCIUM CHLORIDE 100 MG/ML
INJECTION INTRAVENOUS; INTRAVENTRICULAR PRN
Status: DISCONTINUED | OUTPATIENT
Start: 2023-11-10 | End: 2023-11-10 | Stop reason: SDUPTHER

## 2023-11-10 RX ORDER — DESMOPRESSIN ACETATE 4 UG/ML
INJECTION, SOLUTION INTRAVENOUS; SUBCUTANEOUS PRN
Status: DISCONTINUED | OUTPATIENT
Start: 2023-11-10 | End: 2023-11-10 | Stop reason: SDUPTHER

## 2023-11-10 RX ORDER — SODIUM CHLORIDE, SODIUM LACTATE, POTASSIUM CHLORIDE, CALCIUM CHLORIDE 600; 310; 30; 20 MG/100ML; MG/100ML; MG/100ML; MG/100ML
INJECTION, SOLUTION INTRAVENOUS CONTINUOUS PRN
Status: DISCONTINUED | OUTPATIENT
Start: 2023-11-10 | End: 2023-11-10 | Stop reason: SDUPTHER

## 2023-11-10 RX ORDER — IPRATROPIUM BROMIDE AND ALBUTEROL SULFATE 2.5; .5 MG/3ML; MG/3ML
1 SOLUTION RESPIRATORY (INHALATION)
Status: COMPLETED | OUTPATIENT
Start: 2023-11-10 | End: 2023-11-10

## 2023-11-10 RX ORDER — SUCCINYLCHOLINE/SOD CL,ISO/PF 200MG/10ML
SYRINGE (ML) INTRAVENOUS PRN
Status: DISCONTINUED | OUTPATIENT
Start: 2023-11-10 | End: 2023-11-10 | Stop reason: SDUPTHER

## 2023-11-10 RX ORDER — PROTAMINE SULFATE 10 MG/ML
INJECTION, SOLUTION INTRAVENOUS PRN
Status: DISCONTINUED | OUTPATIENT
Start: 2023-11-10 | End: 2023-11-10 | Stop reason: SDUPTHER

## 2023-11-10 RX ORDER — IPRATROPIUM BROMIDE AND ALBUTEROL SULFATE 2.5; .5 MG/3ML; MG/3ML
SOLUTION RESPIRATORY (INHALATION)
Status: COMPLETED
Start: 2023-11-10 | End: 2023-11-10

## 2023-11-10 RX ORDER — LIDOCAINE HYDROCHLORIDE 20 MG/ML
INJECTION, SOLUTION EPIDURAL; INFILTRATION; INTRACAUDAL; PERINEURAL PRN
Status: DISCONTINUED | OUTPATIENT
Start: 2023-11-10 | End: 2023-11-10 | Stop reason: SDUPTHER

## 2023-11-10 RX ORDER — ALBUMIN, HUMAN INJ 5% 5 %
SOLUTION INTRAVENOUS PRN
Status: DISCONTINUED | OUTPATIENT
Start: 2023-11-10 | End: 2023-11-10 | Stop reason: SDUPTHER

## 2023-11-10 RX ORDER — MIDAZOLAM HYDROCHLORIDE 1 MG/ML
INJECTION INTRAMUSCULAR; INTRAVENOUS PRN
Status: DISCONTINUED | OUTPATIENT
Start: 2023-11-10 | End: 2023-11-10 | Stop reason: SDUPTHER

## 2023-11-10 RX ORDER — ACETAMINOPHEN 500 MG
1000 TABLET ORAL
Status: DISCONTINUED | OUTPATIENT
Start: 2023-11-10 | End: 2023-11-10 | Stop reason: HOSPADM

## 2023-11-10 RX ORDER — CEFAZOLIN SODIUM 1 G/3ML
INJECTION, POWDER, FOR SOLUTION INTRAMUSCULAR; INTRAVENOUS PRN
Status: DISCONTINUED | OUTPATIENT
Start: 2023-11-10 | End: 2023-11-10 | Stop reason: HOSPADM

## 2023-11-10 RX ORDER — ESMOLOL HYDROCHLORIDE 10 MG/ML
INJECTION INTRAVENOUS PRN
Status: DISCONTINUED | OUTPATIENT
Start: 2023-11-10 | End: 2023-11-10 | Stop reason: SDUPTHER

## 2023-11-10 RX ORDER — ESMOLOL HYDROCHLORIDE 10 MG/ML
25-300 INJECTION, SOLUTION INTRAVENOUS CONTINUOUS
Status: DISCONTINUED | OUTPATIENT
Start: 2023-11-10 | End: 2023-11-10 | Stop reason: HOSPADM

## 2023-11-10 RX ORDER — MAGNESIUM SULFATE HEPTAHYDRATE 40 MG/ML
INJECTION, SOLUTION INTRAVENOUS PRN
Status: DISCONTINUED | OUTPATIENT
Start: 2023-11-10 | End: 2023-11-10 | Stop reason: SDUPTHER

## 2023-11-10 RX ORDER — ROCURONIUM BROMIDE 10 MG/ML
INJECTION, SOLUTION INTRAVENOUS PRN
Status: DISCONTINUED | OUTPATIENT
Start: 2023-11-10 | End: 2023-11-10 | Stop reason: SDUPTHER

## 2023-11-10 RX ADMIN — MIDAZOLAM HYDROCHLORIDE 1 MG: 1 INJECTION, SOLUTION INTRAMUSCULAR; INTRAVENOUS at 04:35

## 2023-11-10 RX ADMIN — PROTAMINE SULFATE 50 MG: 10 INJECTION, SOLUTION INTRAVENOUS at 12:25

## 2023-11-10 RX ADMIN — METHYLPREDNISOLONE SODIUM SUCCINATE 1000 MG: 1 INJECTION INTRAMUSCULAR; INTRAVENOUS at 06:40

## 2023-11-10 RX ADMIN — HEPARIN SODIUM 5000 UNITS: 1000 INJECTION, SOLUTION INTRAVENOUS; SUBCUTANEOUS at 05:38

## 2023-11-10 RX ADMIN — SODIUM BICARBONATE 50 MEQ: 84 INJECTION, SOLUTION INTRAVENOUS at 12:17

## 2023-11-10 RX ADMIN — IPRATROPIUM BROMIDE AND ALBUTEROL SULFATE 1 DOSE: 2.5; .5 SOLUTION RESPIRATORY (INHALATION) at 00:25

## 2023-11-10 RX ADMIN — CEFAZOLIN 2 G: 1 INJECTION, POWDER, FOR SOLUTION INTRAMUSCULAR; INTRAVENOUS at 08:06

## 2023-11-10 RX ADMIN — PHENYLEPHRINE HYDROCHLORIDE 40 MCG/MIN: 10 INJECTION INTRAVENOUS at 10:08

## 2023-11-10 RX ADMIN — CALCIUM CHLORIDE 1 G: 100 INJECTION INTRAVENOUS; INTRAVENTRICULAR at 12:32

## 2023-11-10 RX ADMIN — FENTANYL CITRATE 150 MCG: 50 INJECTION, SOLUTION INTRAMUSCULAR; INTRAVENOUS at 04:35

## 2023-11-10 RX ADMIN — ALBUMIN (HUMAN) 250 ML: 12.5 INJECTION, SOLUTION INTRAVENOUS at 11:43

## 2023-11-10 RX ADMIN — FENTANYL CITRATE 100 MCG: 50 INJECTION, SOLUTION INTRAMUSCULAR; INTRAVENOUS at 06:03

## 2023-11-10 RX ADMIN — LIDOCAINE HYDROCHLORIDE 80 MG: 20 INJECTION, SOLUTION EPIDURAL; INFILTRATION; INTRACAUDAL; PERINEURAL at 04:35

## 2023-11-10 RX ADMIN — CEFAZOLIN 2 G: 1 INJECTION, POWDER, FOR SOLUTION INTRAMUSCULAR; INTRAVENOUS at 05:05

## 2023-11-10 RX ADMIN — PROTHROMBIN, COAGULATION FACTOR VII HUMAN, COAGULATION FACTOR IX HUMAN, COAGULATION FACTOR X HUMAN, PROTEIN C, PROTEIN S HUMAN, AND WATER 1000 UNITS: KIT at 12:54

## 2023-11-10 RX ADMIN — FENTANYL CITRATE 100 MCG: 50 INJECTION, SOLUTION INTRAMUSCULAR; INTRAVENOUS at 05:23

## 2023-11-10 RX ADMIN — DEXMEDETOMIDINE HYDROCHLORIDE 0.7 MCG/KG/HR: 400 INJECTION, SOLUTION INTRAVENOUS at 09:09

## 2023-11-10 RX ADMIN — MIDAZOLAM HYDROCHLORIDE 2 MG: 1 INJECTION, SOLUTION INTRAMUSCULAR; INTRAVENOUS at 04:07

## 2023-11-10 RX ADMIN — PROTAMINE SULFATE 250 MG: 10 INJECTION, SOLUTION INTRAVENOUS at 11:56

## 2023-11-10 RX ADMIN — FENTANYL CITRATE 75 MCG: 50 INJECTION, SOLUTION INTRAMUSCULAR; INTRAVENOUS at 02:12

## 2023-11-10 RX ADMIN — SODIUM BICARBONATE 50 MEQ: 84 INJECTION, SOLUTION INTRAVENOUS at 12:23

## 2023-11-10 RX ADMIN — MIDAZOLAM HYDROCHLORIDE 2 MG: 1 INJECTION, SOLUTION INTRAMUSCULAR; INTRAVENOUS at 04:54

## 2023-11-10 RX ADMIN — ROCURONIUM BROMIDE 30 MG: 10 INJECTION, SOLUTION INTRAVENOUS at 05:57

## 2023-11-10 RX ADMIN — MAGNESIUM SULFATE HEPTAHYDRATE 2000 MG: 40 INJECTION, SOLUTION INTRAVENOUS at 06:37

## 2023-11-10 RX ADMIN — VASOPRESSIN 0.04 UNITS/MIN: 20 INJECTION INTRAVENOUS at 11:46

## 2023-11-10 RX ADMIN — AMIODARONE HYDROCHLORIDE 300 MG: 50 INJECTION, SOLUTION INTRAVENOUS at 14:15

## 2023-11-10 RX ADMIN — MANNITOL 12 G: 20 INJECTION, SOLUTION INTRAVENOUS at 07:59

## 2023-11-10 RX ADMIN — CALCIUM CHLORIDE 1 G: 100 INJECTION INTRAVENOUS; INTRAVENTRICULAR at 12:18

## 2023-11-10 RX ADMIN — ROCURONIUM BROMIDE 50 MG: 10 INJECTION, SOLUTION INTRAVENOUS at 04:43

## 2023-11-10 RX ADMIN — ROCURONIUM BROMIDE 20 MG: 10 INJECTION, SOLUTION INTRAVENOUS at 05:23

## 2023-11-10 RX ADMIN — PROPOFOL 50 MG: 10 INJECTION, EMULSION INTRAVENOUS at 04:35

## 2023-11-10 RX ADMIN — SODIUM CHLORIDE: 9 INJECTION, SOLUTION INTRAVENOUS at 04:35

## 2023-11-10 RX ADMIN — PROPOFOL 25 MG: 10 INJECTION, EMULSION INTRAVENOUS at 06:18

## 2023-11-10 RX ADMIN — HEPARIN SODIUM 32000 UNITS: 1000 INJECTION, SOLUTION INTRAVENOUS; SUBCUTANEOUS at 06:15

## 2023-11-10 RX ADMIN — ROCURONIUM BROMIDE 30 MG: 10 INJECTION, SOLUTION INTRAVENOUS at 09:11

## 2023-11-10 RX ADMIN — FENTANYL CITRATE 50 MCG: 50 INJECTION, SOLUTION INTRAMUSCULAR; INTRAVENOUS at 04:07

## 2023-11-10 RX ADMIN — CEFAZOLIN 2 G: 1 INJECTION, POWDER, FOR SOLUTION INTRAMUSCULAR; INTRAVENOUS at 11:07

## 2023-11-10 RX ADMIN — CALCIUM CHLORIDE 0.4 G: 100 INJECTION INTRAVENOUS; INTRAVENTRICULAR at 15:01

## 2023-11-10 RX ADMIN — FENTANYL CITRATE 150 MCG: 50 INJECTION, SOLUTION INTRAMUSCULAR; INTRAVENOUS at 05:20

## 2023-11-10 RX ADMIN — ROCURONIUM BROMIDE 20 MG: 10 INJECTION, SOLUTION INTRAVENOUS at 13:39

## 2023-11-10 RX ADMIN — SODIUM CHLORIDE, POTASSIUM CHLORIDE, SODIUM LACTATE AND CALCIUM CHLORIDE: 600; 310; 30; 20 INJECTION, SOLUTION INTRAVENOUS at 04:35

## 2023-11-10 RX ADMIN — SODIUM BICARBONATE 50 MEQ: 84 INJECTION, SOLUTION INTRAVENOUS at 14:29

## 2023-11-10 RX ADMIN — CALCIUM CHLORIDE 0.3 G: 100 INJECTION INTRAVENOUS; INTRAVENTRICULAR at 13:57

## 2023-11-10 RX ADMIN — ESMOLOL HYDROCHLORIDE 10 MG: 10 INJECTION, SOLUTION INTRAVENOUS at 06:29

## 2023-11-10 RX ADMIN — DEXTROSE MONOHYDRATE 1 MG/MIN: 5 INJECTION, SOLUTION INTRAVENOUS at 14:30

## 2023-11-10 RX ADMIN — CALCIUM CHLORIDE 1 G: 100 INJECTION INTRAVENOUS; INTRAVENTRICULAR at 12:05

## 2023-11-10 RX ADMIN — ESMOLOL HYDROCHLORIDE 50 MCG/KG/MIN: 10 INJECTION INTRAVENOUS at 02:01

## 2023-11-10 RX ADMIN — METOPROLOL TARTRATE 5 MG: 5 INJECTION, SOLUTION INTRAVENOUS at 01:32

## 2023-11-10 RX ADMIN — EPINEPHRINE 2 MCG/MIN: 0.1 INJECTION, SOLUTION INTRAVENOUS at 12:00

## 2023-11-10 RX ADMIN — DESMOPRESSIN ACETATE 23.13 MCG: 4 INJECTION, SOLUTION INTRAVENOUS; SUBCUTANEOUS at 12:09

## 2023-11-10 RX ADMIN — SODIUM BICARBONATE 50 MEQ: 84 INJECTION, SOLUTION INTRAVENOUS at 12:15

## 2023-11-10 RX ADMIN — FENTANYL CITRATE 50 MCG: 50 INJECTION, SOLUTION INTRAMUSCULAR; INTRAVENOUS at 11:16

## 2023-11-10 RX ADMIN — CALCIUM CHLORIDE 0.3 G: 100 INJECTION INTRAVENOUS; INTRAVENTRICULAR at 14:55

## 2023-11-10 RX ADMIN — ALBUMIN (HUMAN) 250 ML: 12.5 INJECTION, SOLUTION INTRAVENOUS at 06:25

## 2023-11-10 RX ADMIN — FENTANYL CITRATE 50 MCG: 50 INJECTION, SOLUTION INTRAMUSCULAR; INTRAVENOUS at 04:43

## 2023-11-10 RX ADMIN — SODIUM BICARBONATE 50 MEQ: 84 INJECTION, SOLUTION INTRAVENOUS at 06:11

## 2023-11-10 RX ADMIN — SODIUM CHLORIDE 1000 ML: 9 INJECTION, SOLUTION INTRAVENOUS at 00:36

## 2023-11-10 RX ADMIN — SODIUM BICARBONATE 50 MEQ: 84 INJECTION, SOLUTION INTRAVENOUS at 13:16

## 2023-11-10 RX ADMIN — SODIUM CHLORIDE 2.2 UNITS/HR: 9 INJECTION, SOLUTION INTRAVENOUS at 08:22

## 2023-11-10 RX ADMIN — ALBUMIN (HUMAN) 250 ML: 12.5 INJECTION, SOLUTION INTRAVENOUS at 11:59

## 2023-11-10 RX ADMIN — CEFAZOLIN 2 G: 1 INJECTION, POWDER, FOR SOLUTION INTRAMUSCULAR; INTRAVENOUS at 14:00

## 2023-11-10 RX ADMIN — ALBUMIN (HUMAN) 250 ML: 12.5 INJECTION, SOLUTION INTRAVENOUS at 15:20

## 2023-11-10 RX ADMIN — Medication 100 MG: at 04:36

## 2023-11-10 RX ADMIN — IOPAMIDOL 100 ML: 755 INJECTION, SOLUTION INTRAVENOUS at 01:04

## 2023-11-10 RX ADMIN — PROPOFOL 25 MG: 10 INJECTION, EMULSION INTRAVENOUS at 06:10

## 2023-11-10 RX ADMIN — SODIUM BICARBONATE 50 MEQ: 84 INJECTION, SOLUTION INTRAVENOUS at 13:55

## 2023-11-10 RX ADMIN — FENTANYL CITRATE 150 MCG: 50 INJECTION, SOLUTION INTRAMUSCULAR; INTRAVENOUS at 05:48

## 2023-11-10 RX ADMIN — ROCURONIUM BROMIDE 20 MG: 10 INJECTION, SOLUTION INTRAVENOUS at 11:14

## 2023-11-10 RX ADMIN — ALBUMIN (HUMAN) 250 ML: 12.5 INJECTION, SOLUTION INTRAVENOUS at 11:46

## 2023-11-10 RX ADMIN — ESMOLOL HYDROCHLORIDE 10 MG: 10 INJECTION, SOLUTION INTRAVENOUS at 06:31

## 2023-11-10 RX ADMIN — NOREPINEPHRINE BITARTRATE 8 MCG/MIN: 1 INJECTION, SOLUTION, CONCENTRATE INTRAVENOUS at 11:28

## 2023-11-10 RX ADMIN — SODIUM CHLORIDE 10 G/HR: 900 INJECTION, SOLUTION INTRAVENOUS at 05:05

## 2023-11-10 ASSESSMENT — PAIN DESCRIPTION - LOCATION: LOCATION: CHEST;ABDOMEN

## 2023-11-10 ASSESSMENT — COPD QUESTIONNAIRES: CAT_SEVERITY: SEVERE

## 2023-11-10 ASSESSMENT — PAIN DESCRIPTION - DESCRIPTORS: DESCRIPTORS: DISCOMFORT;ACHING

## 2023-11-10 ASSESSMENT — PAIN SCALES - GENERAL: PAINLEVEL_OUTOF10: 7

## 2023-11-10 ASSESSMENT — PAIN - FUNCTIONAL ASSESSMENT: PAIN_FUNCTIONAL_ASSESSMENT: 0-10

## 2023-11-10 ASSESSMENT — PAIN DESCRIPTION - ORIENTATION: ORIENTATION: MID;UPPER

## 2023-11-10 ASSESSMENT — ENCOUNTER SYMPTOMS: SHORTNESS OF BREATH: 1

## 2023-11-10 NOTE — ED TRIAGE NOTES
Pt arrives from [facility] as a transfer for aortic arch dissection eval w/ cardiac surgery. Pt c/o chest tightness and sob. On arrival pt on esmolol infusing at 125mcg/kg/min.  A&Ox4

## 2023-11-10 NOTE — CARDIO/PULMONARY
Cardiac Rehab: Per EMR, patient is a current smoker.  Smoking Cessation Program information placed on the AVS.    Author Chelsea, RN

## 2023-11-10 NOTE — ANESTHESIA PROCEDURE NOTES
Arterial Line:    An arterial line was placed using ultrasound guidance and surface landmarks, in the OR for the following indication(s): continuous blood pressure monitoring and blood sampling needed. A 20 gauge (size) (length), Arrow (type) catheter was placed, Seldinger technique used, into the left radial artery, secured by Tegaderm. Anesthesia type: Local  Local infiltration: Injection    Events:  patient tolerated procedure well with no complications. 11/10/2023 4:05 AM  Anesthesiologist: Asa Covarrubias DO  Resident/CRNA: RANJITH Valdez CRNA  Performed:  Other anesthesia staff   Preanesthetic Checklist  Completed: patient identified, IV checked, site marked, risks and benefits discussed, surgical/procedural consents, equipment checked, pre-op evaluation, timeout performed, anesthesia consent given, oxygen available, monitors applied/VS acknowledged and fire risk safety assessment completed and verbalized

## 2023-11-10 NOTE — ANESTHESIA POSTPROCEDURE EVALUATION
Department of Anesthesiology  Postprocedure Note    Patient: Rafal Neil  MRN: [id number]  YOB: 1975  Date of evaluation: 11/10/2023      Procedure Summary     Date: 11/10/23 Room / Location: [facility] HEART OR [address] [place] [address] OPEN HEART    Anesthesia Start: 0400 Anesthesia Stop: 5862    Procedure: RIGHT AXILLARY CUTDOWN WITH ARTERIAL ACCESS; AVR, ROOT AND ASCENGING AORTIC ANEURYSM REPAIR WITH 29MM KONECT RESILIA  AORTIC VALVED CONDUIT; ECC; CIRCULATORY ARREST; ASHLEIGH & EPIAORTIC US BY DR. Dana Boas & DR. CANTU [facility] (Chest) Diagnosis:       Dissection of ascending aorta (HCC)      (Dissection of ascending aorta (HCC) [I71.010])    Providers: Karli Russell MD Responsible Provider: Mariella Lo DO    Anesthesia Type: General ASA Status: 5 - Emergent          Anesthesia Type: General    Radha Phase I:      Radha Phase II:        Anesthesia Post Evaluation    Patient location during evaluation: ICU  Note status: Sedated. Post-procedure mental status: Sedated.   Pain score: 0  Airway patency: patent  Nausea & Vomiting: no nausea  Complications: no  Cardiovascular status: hemodynamically stable  Respiratory status: acceptable  Hydration status: euvolemic  Comments: Planned ventilation in ICU  Pain management: adequate

## 2023-11-10 NOTE — CONSULTS
[facility]    ICU TEAM Progress Note    Name: Sylvie Jaruqin   : 1975   MRN: [id number]   Date: 11/10/2023      Assessment/Plan:     Type A Aortic dissection extending to abdominal aorta  S/p emergent AVR, Ao Root/aneurysm repair/replace 11/10 Varinder Apodaca  Circ arrest 36 min; Mannitol, 1gm SoluMed  Neuro checks  OR course c/b:   Episode VF w/ DCCV x 1  Bleeding, Rec'd 3 PRBC, 2 FFP, 1 plt, 1 cryo, TXA, Kcentra, DDAVP, NovoSeven  Post-op labs pending  Amio, insulin gtt''s  Goal SBP < 120, MAP > 65  Levo 5, Pal 20,   Epi, Vaso off  BP Volume responsive. COPD w/ bullous emphysema  Maintain post-op vent support as above  Wean FiO2  nebs  HTN  Hold home regimen, restart as needed/tolerated  TEOFILO, unknown if CPAP  Smoker  Cessation recommended  PTSD  COVID: n/a  SBP Goal of:  < 120  MAP Goal of: > 65 mmHg  IVFs: prn  Transfusion Trigger (Hgb): <8 g/dL and <50K platelets  Respiratory Goals:  Chlorhexidine   Optimize PEEP/Ventilation/Oxygenation  Pulmonary toilet: Duo-Nebs   SpO2 Goal: > 92%  Keep K>4; Mg>2   PT/OT: PT consulted and on board and OT consulted and on board   Discussed Plan of Care/Code Status: Full Code  Discussed Care Plan with Bedside RN  Documentation of Current Medications  Further as below:    F - Feeding:  No npo  A - Analgesia: Dilaudid and Acetaminophen  S - Sedation: Precedex  T - DVT Prophylaxis:  pending    H - Head of Bed: > 30 Degrees  U - Ulcer Prophylaxis: Pepcid (famotidine)   G - Glycemic Control: Insulin  S - Spontaneous Breathing Trial: No  B - Bowel Regimen: None needed at this time  I - Indwelling Catheter:   Tubes: ETT, Chest Tube s, and Orogastric Tube  Lines: Peripheral IV, Arterial Line, and Central Line   CVC:    Drains: Wilson Catheter  D - De-escalation of Antibiotics: as above    Multidisciplinary Rounds Completed:   Yes    ABCDEF Bundle/Checklist Completed:  Yes    SPECIAL EQUIPMENT  PA Catheter    DISPOSITION  Stay in ICU    Subjective:   Progress Note:

## 2023-11-10 NOTE — PROGRESS NOTES
Cardiac Surgery Coordinator- Placed call to Mr Morgan's father Lenin Martínez. Provided update from the OR. He was aware that his son came in for emergent surgery. He stated that his son's friend/sponsor, Jacqueline Ma is here at the hospital.     1100- Met with Mr Morgan's son Faisal Velasco and other friends in the main surgical waiting area. Provided update from the OR. Will continue to follow. 200- Met with Faisal Velasco in the main surgical waiting area, provided update from the OR.     1415 Met with Heide Morgan's son and Dr. Ananya Herzog. Update given, Encouraged him to verbalize and offered emotional support. Provided instructions for visitation in the CVICU. Exchanged contact numbers and provided him with the patient access code. He will be leaving for the day and will return tomorrow.

## 2023-11-10 NOTE — PERIOP NOTE
Patient interviewed in Cardiac Surgery OR  7. Patient identifiers verified with name and date of birth. Procedure verified with patient. Consent verbally verified with Dr Maggie Narayanan and HUGH Velasco at bedside. Allergies verified. Patient informed of procedure and plan of care. Questions answered with review. Patient voiced understanding of procedure and plan of care. Emergent case. Patient arrived to the operating room via stretcher. All wheels locked and patient transferred to the OR table with the assistance of four people. Left radial a-line and right IJ [employer] with MAC placed by anesthesia at the bedside once transferred to OR bed. MTRE warming wrap present on OR table. Temp set at 37 C and monitored by perfusion. After the induction of anesthesia and intubation, a 16 fr temp sensing miles catheter was placed without difficulty. 10 ml of sterile water was used to inflate the balloon. Clear, yellow urine return noted. Urine output monitored by anesthesia. Mepilex sacral pad placed in pre-op, heel pads placed prior to anesthesia induction. Fluids:   0.9 % Sodium Chloride:   PRN irrigation    Sterile water:   1000 ml in splash basin for instrument care.

## 2023-11-10 NOTE — DISCHARGE INSTRUCTIONS
Smoking Cessation Program: This is a free, phone/web based, smoking cessation program. The program is individualized to meet each patient's needs. To enroll use the link - www.quitnow[place]. org or call 0-411-GRRGAYJ ([phone]) .

## 2023-11-10 NOTE — ED TRIAGE NOTES
Pt c/o chest pressure/tightness, sob and numbness in legs and arms that started 45 minutes ago when he was walking his dog.

## 2023-11-10 NOTE — PROGRESS NOTES
TRANSFER - OUT REPORT:    Verbal report given to SAMI BOOKER on [address]  being transferred to CVICU for routine post-op       Report consisted of patient's Situation, Background, Assessment and   Recommendations(SBAR). Information from the following report(s) Surgery Report was reviewed with the receiving nurse. Lines:   CVC Double Lumen 11/10/23 (Active)       Pulmonary Artery Cath Single 11/10/23 (Active)       Peripheral IV 11/09/23 Right Antecubital (Active)   Site Assessment Clean, dry & intact 11/09/23 2253   Line Status Blood return noted 11/09/23 2253   Phlebitis Assessment No symptoms 11/09/23 2253   Infiltration Assessment 0 11/09/23 2253   Alcohol Cap Used Yes 11/09/23 2253       Peripheral IV 11/10/23 Left Antecubital (Active)   Site Assessment Clean, dry & intact 11/10/23 0140   Line Status Blood return noted; Flushed 11/10/23 0140   Phlebitis Assessment No symptoms 11/10/23 0140   Infiltration Assessment 0 11/10/23 0140   Dressing Status Clean, dry & intact 11/10/23 0140   Dressing Type Transparent 11/10/23 0140   Dressing Intervention New 11/10/23 0140       Peripheral IV 11/10/23 Right Antecubital (Active)   Site Assessment Clean, dry & intact 11/10/23 0350   Line Status Blood return noted;Brisk blood return;Flushed;Specimen collected 11/10/23 0350   Phlebitis Assessment No symptoms 11/10/23 0350   Infiltration Assessment 0 11/10/23 0350   Alcohol Cap Used No 11/10/23 0350   Dressing Status New dressing applied;Clean, dry & intact 11/10/23 0350   Dressing Type Transparent 11/10/23 0350       Arterial Line 11/10/23 Radial (Active)       Introducer 11/10/23 (Active)        Opportunity for questions and clarification was provided.       Patient transported with:  Monitor, O2 @ 10lpm, and Registered Nurse

## 2023-11-10 NOTE — ED NOTES
TRANSFER - OUT REPORT:    Verbal report given to Cleopatra Padilla RN on [address]  being transferred to [facility] ED for urgent transfer       Report consisted of patient's Situation, Background, Assessment and   Recommendations(SBAR). Information from the following report(s) ED SBAR, MAR, Recent Results, and Med Rec Status was reviewed with the receiving nurse. Lines:   Peripheral IV 11/09/23 Right Antecubital (Active)   Site Assessment Clean, dry & intact 11/09/23 2253   Line Status Blood return noted 11/09/23 2253   Phlebitis Assessment No symptoms 11/09/23 2253   Infiltration Assessment 0 11/09/23 2253   Alcohol Cap Used Yes 11/09/23 2253       Peripheral IV 11/10/23 Left Antecubital (Active)   Site Assessment Clean, dry & intact 11/10/23 0140   Line Status Blood return noted; Flushed 11/10/23 0140   Phlebitis Assessment No symptoms 11/10/23 0140   Infiltration Assessment 0 11/10/23 0140   Dressing Status Clean, dry & intact 11/10/23 0140   Dressing Type Transparent 11/10/23 0140   Dressing Intervention New 11/10/23 0140        Opportunity for questions and clarification was provided.       Patient transported with:  Monitor via EMS           Aubrey Gong RN  11/10/23 3005

## 2023-11-10 NOTE — ED NOTES
RAA at bedside  Esmolol continued on external transport via EMS pump  EMS given nicardipine (already mixed in 250 mL NS bag) bag labeled and nicardipine vial attached to bag  Per Doctor's verbal order, nicardipine to be held until HR in [id number] [place] [address], Best Simpson, RN  11/10/23 0227       Citlali Lindquist RN  11/10/23 0233

## 2023-11-10 NOTE — BRIEF OP NOTE
BRIEF OP NOTE  Pre-Op Diagnosis: Dissection of ascending aorta (HCC) [I71.010]    Post-Op Diagnosis: Dissection of ascending aorta (HCC) [I71.010]      Procedure: Procedure(s):  RIGHT AXILLARY CUTDOWN WITH ARTERIAL ACCESS; AVR, ROOT AND ASCENGING AORTIC ANEURYSM REPAIR WITH 29MM KONECT RESILIA  AORTIC VALVED CONDUIT; ECC; CIRCULATORY ARREST; ASHLEIGH & EPIAORTIC US BY DR. Harsha Mills & DR. CANTU [facility]    Surgeon: Dr. Keshawn Carlos MD    Assistant(s): Katelyn Rojo PA-C, Anahi Versa PA-C, José Miguel Whatley PA-C    Anesthesia: General     Infusions: Phenylephrine, Amiodarone, Vasopressin, Levophed, precedex, insulin     Estimated Blood Loss: 4500 ml     Cell Saver: 1500 ml    Specimens:   ID Type Source Tests Collected by Time Destination   1 : Portion of ascending aorta Tissue Aorta SURGICAL PATHOLOGY Román Reina MD 11/10/2023 1585    2 : Native Aortic Valve Leaflets Tissue Heart SURGICAL PATHOLOGY Román Reina MD 11/10/2023 3398        Drains and pacing wires: 2 atrial wires, 1 bipolar ventricular wire, 4 gregory drains (2 Mediastinal, 1 L pleural)    Complications: V-fib arrest requiring defibrillation while closing sterna fascia. CPR for ~20 sec. See full op note    Findings: See full operative note. Implants:   Implant Name Type Inv.  Item Serial No.  Lot No. LRB No. Used Action   PUTTY BONE HEMSTAT ABSORB MTRX 2.0GRAM - SNA  PUTTY BONE HEMSTAT ABSORB MTRX 2.0GRAM NA [employer]- [id number] N/A 1 Implanted   GRAFT VSCLR L30CM D10MM 1 Particle Code DBLE VLR PASS [facility] TUBE - [id number] Vascular grafts GRAFT VSCLR L30CM D10MM 1 Particle Code DBLE VLR PASS [facility] TUBE [id number]- [id number] Right 1 Implanted   FELT SURG [id number] THK1.65MM PTFE FOR THE REP OF SEPT DEFCT - SNA  FELT SURG [id number] THK1.65MM PTFE FOR THE REP OF SEPT DEFCT NA [place] [employer]- [id number]  1 Implanted   VALVE AORT 29 MM CONDUIT Tammy Tapia [id number] Aortic valves VALVE AORT 29 MM CONDUIT JOSEY DARLING [id number] [employer]

## 2023-11-10 NOTE — ANESTHESIA PROCEDURE NOTES
Procedure Performed: ASHLEIGH       Start Time:  11/10/2023 4:50 AM       End Time:      Preanesthesia Checklist:  Patient identified, IV assessed, risks and benefits discussed, monitors and equipment assessed, procedure being performed at surgeon's request and anesthesia consent obtained. General Procedure Information  Diagnostic Indications for Echo:  assessment of ascending aorta, assessment of surgical repair, hemodynamic monitoring and assessment of valve function  Location performed:  OR  Intubated  Bite block placed  Heart visualized  Probe Insertion:  Easy  Probe Type:  Mulitplane, epiaortic and biplane  Modalities:  2D, color flow mapping, continuous wave Doppler, pulse wave Doppler and M-mode    Echocardiographic and Doppler Measurements    Ventricles    Right Ventricle:  Cavity size normal.  Hypertrophy not present. Thrombus not present. Global function normal.    Left Ventricle:  Cavity size normal.  Hypertrophy present. Thrombus not present. Global Function normal.    Other Ventricular Findings:       LVEF >55%    Ventricular Regional Function:  1- Basal Anteroseptal:  normal  2- Basal Anterior:  normal  3- Basal Anterolateral:  normal  4- Basal Inferolateral:  normal  5- Basal Inferior:  normal  6- Basal Inferoseptal:  normal  7- Mid Anteroseptal:  normal  8- Mid Anterior:  normal  9- Mid Anterolateral:  normal  10- Mid Inferolateral:  dyskinetic  11- Mid Inferior:  dyskinetic  12- Mid Inferoseptal:  dyskinetic  13- Apical Anterior:  normal  14- Apical Lateral:  normal  15- Apical Inferior:  normal  16- Apical Septal:  normal  17- [place]:  normal    Wall Motion Comments:       No WMA    Valves    Aortic Valve: Annulus normal.  Stenosis not present. Regurgitation none. Leaflets normal.  Leaflet motions normal.      Mitral Valve: Annulus normal.  Stenosis not present. Regurgitation none. Leaflets normal.  Leaflet motions normal.      Tricuspid Valve: Annulus normal.  Stenosis not present.

## 2023-11-10 NOTE — PROGRESS NOTES
1530: Pt arrived from [facility] by OR team pt very labile with BP MAPS 50-60  TRANSFER - IN REPORT:    Verbal report received from CSOR on Serene Angelucci  being received from Jenny Montero MD and CRNA  for routine post-op      Report consisted of patient's Situation, Background, Assessment and   Recommendations(SBAR). Information from the following report(s) Adult Overview, Surgery Report, Intake/Output, MAR, Recent Results, and Cardiac Rhythm NSR  was reviewed with the receiving nurse. Opportunity for questions and clarification was provided. Assessment completed upon patient's arrival to unit and care assumed. 65 Dr. Gasper Ley called for an update. 1700: Dr. Gasper Ley called again for update. Albumin given for low CI Vaso on at 0.04 titrating levophed up segundo off     Dr. Kaylee Newberry updated. 4 albumins given total. CI finally got to 1.5 but only for a moment. Orders for platelets and FFP ordered. 200 Dr. Gasper Ley called for update orders LR hung 1000 ml bolus Lots of highs ad lows with BP titrating drips constantly. MAPs of 50;s for a while. 1800 propofol stopped for low MAP  FFP  hung to infuse. Still trying to add Epi per Dr. Gasper Ley request    1930: Dr. Jaswinder Guzmán at bedside along with Sirena Yoon RN complete update given to Dr. Quinn Santana  Updates given to Dr. Gasper Ley by phone. Dr. Quinn Santana giving orders for tirating levo down as we add Epi.   2000: Pt awoke moved all extremities and nodded appropriately then sedated. Bedside and Verbal shift change report given to Lela Maynard  (oncoming nurse) by Burnell Severance RN (offgoing nurse). Report included the following information Surgery Report, Intake/Output, MAR, Recent Results, Med Rec Status, and Cardiac Rhythm NSR .

## 2023-11-10 NOTE — CONSULTS
Received consult for diabetes mgmt s/p cardiac surgery. Currently A1C 5.1% prior to surgery which indicates no diabetes. Emergent surgery noted today, 11/10. Will require insulin infusion per CTS orderset for at least 48h/  After 48h if hourly infusion rate <2u/hr and patient 24h needs consistent, may transition off insulin infusion per CTS protocol.   Will follow up on Monday -    [address] [facility] [facility], [facility]   Program for Diabetes Health

## 2023-11-10 NOTE — PERIOP NOTE
ancef 2gm given @ 0505 am    betablocker given  esmolol drip given during transport from outside hospital today.

## 2023-11-10 NOTE — ED NOTES
Pt transport to [address] [place] [address], [address] [facility], [address]  11/10/23 9647

## 2023-11-10 NOTE — ANESTHESIA PROCEDURE NOTES
Central Venous Line:    A central venous line was placed using ultrasound guidance and surface landmarks, in the OR for the following indication(s): central venous access and CVP monitoring. 11/10/2023 4:12 AM    Sterility preparation included the following: hand hygiene performed prior to procedure, maximum sterile barriers used and sterile technique used to drape from head to toe. The patient was placed in Trendelenburg position. The right internal jugular vein was prepped. The site was prepped with Chloraprep. A 9 Fr (size), 12 (length), introducer double lumen was placed. During the procedure, the following specific steps were taken: target vein identified, needle advanced into vein and blood aspirated and guidewire advanced into vein. Intravenous verification was obtained by ultrasound, venous blood return and manometry. Post insertion care included: all ports aspirated, all ports flushed easily, guidewire removed intact, Biopatch applied, line sutured in place and dressing applied. During the procedure the patient experienced: patient tolerated procedure well with no complications. Outcomes: uncomplicated  Anesthesia type: localA(n) oximetric, 8 (size) Pulmonary Artery Catheter (PAC) was placed through the Introducer CVL in the right internal jugular vein. The PAC placement was confirmed by pressure tracing changes and ASHLEIGH. The patient experienced the following events during the procedure: patient tolerated procedure well with no complications. PA Cath placed?: Yes  Staffing  Performed: Anesthesiologist   Anesthesiologist: Kym Mckenzie DO  Performed by: Kym Mckenzie DO  Authorized by: Kym Mckenzie DO    Preanesthetic Checklist  Completed: patient identified, IV checked, site marked, risks and benefits discussed, surgical/procedural consents, equipment checked, pre-op evaluation, timeout performed, anesthesia consent given, oxygen available, monitors applied/VS acknowledged and
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
## 2023-11-11 NOTE — SIGNIFICANT EVENT
Multiple attempts have been made to contact patient's father, Jeffry Cheng, at the phone number on file to no avail. A voice message was left describing the importance of the call and the need for his timely response. We will move forward with transfusion of blood products, namely platelets, due to the patient's severely high risk of stroke and bleeding secondary to thrombocytopenia (platelet count of 45.) Dr. Hartley Barley aware and in agreement.

## 2023-11-11 NOTE — PROGRESS NOTES
0800: Bedside and Verbal shift change report given to [employer], RN (oncoming nurse) by Ramon Bah RN (offgoing nurse). Report included the following information Intake/Output, MAR, Recent Results, and Cardiac Rhythm NSR . Cardiac surgery round at the bedside; no plans for extubation; orders for platelets         6156: Timo Downing MD updated by RN; orders for CMP daily    1045: briefly paused cardene d/t MAP < 65     1024: Martha Dacosta NP notified of labile BP & CI- ok to give 1 x albumin    1218: Potassium 3.6 - 2runs to replete  Tyelnol given for fever of 101.6    1253: ABG pulled d/t lower O2 sat and decreased in SvO2 Martha Dacosta NP notified. ABG - 7.43, pCO2 37, pO2 54, HCO3 25  FiO2 increased to 80 by RT    1410: adjust lasix dose to 20mg per FISH johnson; give 1 x albumin     1420: lactate 2.6; intensivist notified; no new orders       [phone]: Discussed pt status with Ruthie Lang MD and notified Martha Dacosta NP - starting dobut at 2.5     1750: Spoke with Timo Downing MD; updated by RN and discussed plan for drips; goal to try to titrate epi or dobut 5 and see pt response; goal to keep CI 1.8 > and -130    1930: Bedside and Verbal shift change report given to Ramon Bah RN  (oncoming nurse) by [employer], RN (offgoing nurse). Report included the following information Intake/Output, MAR, Recent Results, and Cardiac Rhythm NSR .

## 2023-11-11 NOTE — PROGRESS NOTES
Physical Therapy 11/11/2023         Chart reviewed, patient received sedated and on vent. Pt is POD 1 emergent AVR. Per ABCDEF protocol, will work with patient when PEEP is 10.0 or less, FIO2 60% or less, and patient is following basic commands. Will follow patient peripherally.    Will continue to follow    Marilyn Pascual PT

## 2023-11-11 NOTE — PROGRESS NOTES
Occupational Therapy  11/11/23    Chart reviewed, patient received sedated and on vent. Pt is POD 1 emergent AVR. Per ABCDEF protocol, will work with patient when PEEP is 10.0 or less, FIO2 60% or less, and patient is following basic commands. Will follow patient peripherally. Recommend nursing to complete with patient, as able and per protocol, in order to promote cardiopulmonary systems, maintain strength, endurance and independence:   -bed in chair position or maximize full reverse Trendelenburg position to facilitate upright activity with foot board and non-skid footwear on 3x/day ~30-60 mins each   -ROM during bathing B UEs and LEs  -positioning to prevent contractures and edema  RASS -1/0/+1 (during SAT) Active ROM  Sitting (bed in chair position)  Standing (reverse Trendelenburg)  ADLs   RASS -3/2 Passive ROM  Sit (bed in chair position)   RASS -5/-4 Passive ROM       Thank you for your assistance.

## 2023-11-11 NOTE — PROGRESS NOTES
Bedside and Verbal shift change report given to [address] (oncoming nurse) by Ella Echevarria (offgoing nurse). Report included the following information Nurse Handoff Report, Adult Overview, Surgery Report, Recent Results, and Cardiac Rhythm SR .      Dr. Lexi Chau at bedside, discussing plan of care and clinical goals for shift. FiO2 to 60%. Plan to titrate norepi and vaso down, go up on Epi for CI support. Goal -120 to optimize CI. Pt spontaneously awoke during bedside TTE, moved all extremities to command, shook/nodded head appropriately.  AB.41. 33.9, 80, 21.7, 96%  Dr. Lexi Chau notified of result    2153 Pt noted to be tachypneic, breathing over vent. Fent gtt increased to 75    2155 Pt suddenly awake, moving all extremities, attempting to sit up, gagging. Unable to be deescalated or reassured verbally. 1 mg Versed given, Dex gtt to 1.5.     Paged Dr. Lexi Chau regarding Pt's period of agitation, ongoing tachypnea and gagging. Order to restart low dose propofol gtt. 2225 Platelet transfusion started. 2326 Platelets complete. 0013 Platelet transfusion started. 7275 Platelets complete. 12 Order received to give 1 unit cryoprecipitate. Blood bank notified due to downtime. 0405 Cryo transfusion started. 4788 Cryo complete. 0451 250 ml albumin given for CI 1.9 with BP in goal range. Bedside and Verbal shift change report given to Yair Rivera (oncoming nurse) by Lan Khan (offgoing nurse). Report included the following information Nurse Handoff Report, Adult Overview, Surgery Report, Recent Results, and Cardiac Rhythm NSR .

## 2023-11-12 NOTE — PROGRESS NOTES
0800: Bedside and Verbal shift change report given to Elizabeth Calderon RN (oncoming nurse) by Sanchez Mack, RN (offgoing nurse). Report included the following information Intake/Output, MAR, Recent Results, and Cardiac Rhythm NSR . Cardiac surgery rounds at the bedside- no SBT or awakening for today     1045: Pt not tolerating turns; desat to the 80s; increased peak pressure & endotracheal secretions; tachypneic w/ abdominal breathing Taina Avila MD notified    Taina Avila MD and Bryan Christopher NP at the bedside; FiO2 to 100%; 1mg of versed given; orders for stat chest xray, blood cultures, sputum, UA    1510: Colt Begum (son) and Renita Nix (Father of pt) at bedside- Colt Begum declining to make medical decisions; contact Renita Nix (pt's father for medical decisions)    1515: Bryan Christopher NP notified of platelets of 51 - orders for 2 units    1750: Zaria Enriquez MD updated by RN; orders for ABG    1759: ABG 7.5, CO2 33.9, pO2 55, HCO3 26.9    1800: Taina Avila MD at bedside; vent settings adjusted to 8 of PEEP, RR of 18, and increased exp time     1830: ok to d/c insulin gtt per Taina Avila MD - orders for 15 units of lantus    1930: Bedside and Verbal shift change report given to Sanchez Mack RN (oncoming nurse) by Elizabeth Calderon RN (offgoing nurse). Report included the following information Intake/Output, MAR, Recent Results, and Cardiac Rhythm NSR .

## 2023-11-12 NOTE — CONSENT
Informed Consent for Blood Component Transfusion Note    I have discussed with the father the rationale for blood component transfusion; its benefits in treating or preventing fatigue, organ damage, or death; and its risk which includes mild transfusion reactions, rare risk of blood borne infection, or more serious but rare reactions. I have discussed the alternatives to transfusion, including the risk and consequences of not receiving transfusion. The father had an opportunity to ask questions and had agreed to proceed with transfusion of blood components.     Electronically signed by RASHID Pérez on 11/12/23 at 3:26 PM EST

## 2023-11-12 NOTE — PROGRESS NOTES
Physical Therapy Note  11/12/2023    Chart reviewed in prep for PT eval s/p emergent repair of ascending aortic aneurysm repair; pt remains intubated (FiO2 70%) and sedated. Pt continuing to work towards medical stability for participation in skilled PT eval; will hold and continue to follow. Recommend nursing to complete with patient, as able and per protocol, in order to promote cardiopulmonary systems, maintain strength, endurance and independence:   -bed in chair position or maximize full reverse Trendelenburg position to facilitate upright activity with foot board and non-skid footwear on 3x/day ~30-60 mins each   -ROM during bathing B UEs and LEs  -positioning to prevent contractures and edema. RASS -1/0/+1 (during SAT) Active ROM  Sitting (bed in chair position)  Standing (reverse Trendelenburg)  ADLs   RASS -3/2 Passive ROM  Sit (bed in chair position)   RASS -5/-4 Passive ROM     Thank you for your assistance.      Pawel Patel, PT, DPT

## 2023-11-12 NOTE — PROGRESS NOTES
Bedside and Verbal shift change report given to [address] (oncoming nurse) by Fang Beltre (offgoing nurse). Report included the following information Nurse Handoff Report and Recent Results. 2005 Dr. Cesar Nielsen at bedside, discussing Pt status and plan of care for the night. Peep increased 10-->12, order received for 20 mg lasix. 2033 Dr. Cesar Nielsen at bedside, FiO2 80-->70%    2100 Pt spontaneously awoke, was briefly calm and followed simple commands, demonstrated movement in all extremities. Pt rapidly became more agitated, repeatedly attempting to sit up, dyssynchronous with vent, not deescalating to verbal reassurance. 1 mg Versed given, Propofol increased 10-->15    0211 Pt more aroused, tachypneic. Propofol 15-->20    0634 Abrupt drop in SpO2 to 85%, good TV. FiO2 increased to 100% temporarily. Attempted to suction, scant thick secretions. Pt opening eyes, couhging, dyssynchronous with vent. 1 mg Versed given. SpO2 slowly recovered back to low 90s, then placed back on 70% FiO2.     0703 Pt again dropped SpO2 <90%. Suction attempted again, no secretions noted. Good TV, normal peak pressures. FiO2 increased to 100%, RT called to bedside. 0705 RT at bedside, attempted suction with no secretions. Expiratory time adjusted on vent, no other changes. 1067 Dr. Edgard Oneal at bedside, updated on Pt condition overnight and recent episodes of desaturation. No new recs at this time. Bedside and Verbal shift change report given to Fang Beltre (oncoming nurse) by Lan Khan (offgoing nurse). Report included the following information Nurse Handoff Report and Recent Results.

## 2023-11-12 NOTE — PROGRESS NOTES
Occupational Therapy Note  11/12/2023     Chart reviewed in prep for OT eval s/p emergent repair of ascending aortic aneurysm repair; pt remains intubated (FiO2 70%) and sedated. Pt continuing to work towards medical stability for participation in skilled PT eval; will hold and continue to follow. Recommend nursing to complete with patient, as able and per protocol, in order to promote cardiopulmonary systems, maintain strength, endurance and independence:   -bed in chair position or maximize full reverse Trendelenburg position to facilitate upright activity with foot board and non-skid footwear on 3x/day ~30-60 mins each   -ROM during bathing B UEs and LEs  -positioning to prevent contractures and edema. RASS -1/0/+1 (during SAT) Active ROM  Sitting (bed in chair position)  Standing (reverse Trendelenburg)  ADLs   RASS -3/2 Passive ROM  Sit (bed in chair position)   RASS -5/-4 Passive ROM      Thank you for your assistance.       Ashlee Rashid, OTR/L

## 2023-11-12 NOTE — PROGRESS NOTES
Clinical Pharmacy Note - Extended Infusion Beta-Lactam Dosing    This patient has been ordered Zosyn at 3375 mg IV every 6 hours. P&T protocol allows automatic substitution to an extended infusion and to adjust the dose based on indication and renal function. Indication: Bloodstream infection    CrCl: 60 mL/min    BMI: 25.78 kg/m2    Per P&T protocol, the Zosyn dosing will be adjusted to 4500 mg IV push over 30 minutes followed by 3375 mg IV every 8 hours (each dose will be infused over 4 hours). Please call pharmacy with any questions.

## 2023-11-12 NOTE — PROGRESS NOTES
4 Eyes Skin Assessment     NAME:  Elsy Morgan  YOB: 1975  MEDICAL RECORD NUMBER:  [id number]    The patient is being assessed for  Admission    I agree that at least one RN has performed a thorough Head to Toe Skin Assessment on the patient. ALL assessment sites listed below have been assessed. Areas assessed by both nurses:    Head, Face, Ears, Shoulders, Back, Chest, Arms, Elbows, Hands, Sacrum. Buttock, Coccyx, Ischium, and Legs. Feet and Heels        Does the Patient have a Wound?  No noted wound(s)       Bahman Prevention initiated by RN: Yes  Wound Care Orders initiated by RN: No    Pressure Injury (Stage 3,4, Unstageable, DTI, NWPT, and Complex wounds) if present, place Wound referral order by RN under : No    New Ostomies, if present place, Ostomy referral order under : No     Nurse 1 eSignature: Electronically signed by Ty Webb RN on 11/11/23 at 8:20 PM EST    **SHARE this note so that the co-signing nurse can place an eSignature**    Nurse 2 eSignature: Electronically signed by Amber David RN on 11/11/23 at 8:34 PM EST Detail Level: Detailed Additional Notes (Optional): Discussed removal options, Liquid Nitrogen (LN2) vs shave removal. Expressed that Liquid Nitrogen (LN2) and shave removal may be considered cosmetic and that the fee is patos Hide Additional Notes?: No

## 2023-11-13 ENCOUNTER — ANESTHESIA (OUTPATIENT)
Facility: HOSPITAL | Age: 48
End: 2023-11-13

## 2023-11-13 ENCOUNTER — ANESTHESIA EVENT (OUTPATIENT)
Facility: HOSPITAL | Age: 48
End: 2023-11-13

## 2023-11-13 PROBLEM — J96.90 RESPIRATORY FAILURE (HCC): Status: ACTIVE | Noted: 2023-01-01

## 2023-11-13 PROBLEM — R73.9 HYPERGLYCEMIA: Status: ACTIVE | Noted: 2023-01-01

## 2023-11-13 LAB
EKG ATRIAL RATE: 60 BPM
EKG ATRIAL RATE: 73 BPM
EKG DIAGNOSIS: NORMAL
EKG DIAGNOSIS: NORMAL
EKG P AXIS: 39 DEGREES
EKG P-R INTERVAL: 160 MS
EKG Q-T INTERVAL: 426 MS
EKG Q-T INTERVAL: 474 MS
EKG QRS DURATION: 158 MS
EKG QRS DURATION: 162 MS
EKG QTC CALCULATION (BAZETT): 453 MS
EKG QTC CALCULATION (BAZETT): 469 MS
EKG R AXIS: -8 DEGREES
EKG R AXIS: 50 DEGREES
EKG T AXIS: 23 DEGREES
EKG T AXIS: 6 DEGREES
EKG VENTRICULAR RATE: 55 BPM
EKG VENTRICULAR RATE: 73 BPM

## 2023-11-13 PROCEDURE — 93010 ELECTROCARDIOGRAM REPORT: CPT | Performed by: INTERNAL MEDICINE

## 2023-11-13 PROCEDURE — 2580000003 HC RX 258: Performed by: ANESTHESIOLOGY

## 2023-11-13 PROCEDURE — 6360000002 HC RX W HCPCS: Performed by: ANESTHESIOLOGY

## 2023-11-13 PROCEDURE — 2500000003 HC RX 250 WO HCPCS: Performed by: ANESTHESIOLOGY

## 2023-11-13 RX ORDER — HEPARIN SODIUM 1000 [USP'U]/ML
INJECTION, SOLUTION INTRAVENOUS; SUBCUTANEOUS PRN
Status: DISCONTINUED | OUTPATIENT
Start: 2023-11-13 | End: 2023-11-13 | Stop reason: SDUPTHER

## 2023-11-13 RX ORDER — SODIUM CHLORIDE 9 MG/ML
INJECTION, SOLUTION INTRAVENOUS CONTINUOUS PRN
Status: DISCONTINUED | OUTPATIENT
Start: 2023-11-13 | End: 2023-11-13 | Stop reason: SDUPTHER

## 2023-11-13 RX ORDER — FENTANYL CITRATE 50 UG/ML
INJECTION, SOLUTION INTRAMUSCULAR; INTRAVENOUS PRN
Status: DISCONTINUED | OUTPATIENT
Start: 2023-11-13 | End: 2023-11-13 | Stop reason: SDUPTHER

## 2023-11-13 RX ORDER — MIDAZOLAM HYDROCHLORIDE 1 MG/ML
INJECTION INTRAMUSCULAR; INTRAVENOUS PRN
Status: DISCONTINUED | OUTPATIENT
Start: 2023-11-13 | End: 2023-11-13 | Stop reason: SDUPTHER

## 2023-11-13 RX ORDER — ROCURONIUM BROMIDE 10 MG/ML
INJECTION, SOLUTION INTRAVENOUS PRN
Status: DISCONTINUED | OUTPATIENT
Start: 2023-11-13 | End: 2023-11-13 | Stop reason: SDUPTHER

## 2023-11-13 RX ADMIN — ROCURONIUM BROMIDE 30 MG: 10 SOLUTION INTRAVENOUS at 10:29

## 2023-11-13 RX ADMIN — MIDAZOLAM HYDROCHLORIDE 2 MG: 1 INJECTION, SOLUTION INTRAMUSCULAR; INTRAVENOUS at 08:45

## 2023-11-13 RX ADMIN — ROCURONIUM BROMIDE 20 MG: 10 SOLUTION INTRAVENOUS at 11:35

## 2023-11-13 RX ADMIN — FENTANYL CITRATE 100 MCG: 50 INJECTION, SOLUTION INTRAMUSCULAR; INTRAVENOUS at 08:45

## 2023-11-13 RX ADMIN — HEPARIN SODIUM 8000 UNITS: 1000 INJECTION, SOLUTION INTRAVENOUS; SUBCUTANEOUS at 09:59

## 2023-11-13 RX ADMIN — ROCURONIUM BROMIDE 50 MG: 10 SOLUTION INTRAVENOUS at 08:45

## 2023-11-13 RX ADMIN — SODIUM CHLORIDE: 9 INJECTION, SOLUTION INTRAVENOUS at 09:35

## 2023-11-13 RX ADMIN — MIDAZOLAM HYDROCHLORIDE 3 MG: 1 INJECTION, SOLUTION INTRAMUSCULAR; INTRAVENOUS at 10:30

## 2023-11-13 ASSESSMENT — COPD QUESTIONNAIRES: CAT_SEVERITY: SEVERE

## 2023-11-13 ASSESSMENT — ENCOUNTER SYMPTOMS: SHORTNESS OF BREATH: 1

## 2023-11-13 NOTE — BRIEF OP NOTE
BRIEF OP NOTE  Pre-Op Diagnosis: Heart failure, unspecified HF chronicity, unspecified heart failure type ([address]) [I50.9]    Post-Op Diagnosis: * No post-op diagnosis entered *      Procedure: Procedure(s):  VENOVENOUS EXTRA CORPOREAL MEMBRANE OXYGENATION, ASHLEIGH BY DR HUYNH    Surgeon: Gisel Ramsey MD    Assistant(s): Alba Arias PA-C    Anesthesia: General     Infusions: Amiodarone, precedex, insulin,     Estimated Blood Loss: 53RF    Complications: None    Findings: See full operative note.

## 2023-11-13 NOTE — DIABETES MGMT
[facility]  DIABETES MANAGEMENT CONSULT    Consulted by Provider for advanced nursing evaluation and care for inpatient blood glucose management. Evaluation and Action Plan   This 46year old AA male was transferred from [facility] 11/9/23 to [facility] when AA noted for emergent intervention. Patient underwent repair of AA 11/10/23. Recovering in ICU. ECMO placement 11/12/23. Remains intubated, sedated and on vent support, along with multiple drugs to support rhythm and BP. As for BG management, patient did not have diabetes PTA. Insulin needs trended down from a high of 18 units/hr to <3 units/hr at the time of transition off insulin infusion; given 15 units of Lantus insulin. Has been on corrective insulin since then. FBG today was 190. KARINE. Management Rationale Action Plan   Medication   Basal needs Based on  [x]        Blood glucose pattern  []        Weight & BMI  [x]        Kidney dysfunction  []        Home diabetes regimen     Begin 10 units of Lantus insulin   Nutritional needs Based on  []        Blood glucose pattern  []        CHO load  []        Enteral feeding CHO load  []        TPN/PPN dextrose load     NA   Corrective insulin Based on sensitivity  [x]        Low   []        Medium  []        High      Additional orders             Initial Presentation   Ailyn Wagner is a 46 y.o. male transferred 11/9/23 from [facility] ER after experiencing sudden onset of chest pain with radiation across chest in a band & dyspnea. Felt transient tingling to his bilateral feet and hands; some ongoing tingling to his right volar forearm. NSR with right BBB. Normotensive. 02 sats 97%  ER exam:  Constitutional:       Appearance: He is well-developed. Comments: Anxious appearing  Cardiovascular:      Rate and Rhythm: Normal rate and regular rhythm. Comments: 2+ symmetric radial and DP pulses  Pulmonary:      Effort: Pulmonary effort is normal.      Breath sounds: Normal breath sounds.

## 2023-11-13 NOTE — PERIOP NOTE
Pt earrings removed and placed in labeled cup.  Hand delivered to [facility] after case

## 2023-11-13 NOTE — PROGRESS NOTES
Cardiac Surgery Coordinator- Provided contact numbers to Dr Areli Tavera. Mr Ev Alba will be going to the OR for cannulation for ECMO support. 1115- Met with Winter Buddy family and Dr. Areli Tavera. Update given, Encouraged family to verbalize and offered emotional support. 1300- Met with the family of Laurel Davidson provided update from the bedside nurse. 1- Escorted family to the bedside in CVICU, reviewed plan of care and encouraged them to verbalize. They will be waiting in the fourth floor waiting room. Will continue to follow for educational and emotional support.

## 2023-11-13 NOTE — PROGRESS NOTES
Occupational Therapy  11/13/2023    Chart reviewed. Patient remained medically unstable and inappropriate for OT intervention/session at this time. Cleared from CTS to sign off. Please re-consult as needs arise. Thank you for your consideration.      Dian Vanegas, MOHINDER, OTR/L

## 2023-11-13 NOTE — PROGRESS NOTES
Report received form Mary GALLARDO assessment done. Cardiac surgery rounds done lots of disscussion about  VV ECMO  Dr. Maggie Narayanan here in the hospital.  Obtained consent from the mother by phone. 0830  TRANSFER - OUT REPORT:    Verbal report given to [address]  on [address]  being transferred to [facility] for ordered procedure       Report consisted of patient's Situation, Background, Assessment and   Recommendations(SBAR). Information from the following report(s) Intake/Output, MAR, Recent Results, and Cardiac Rhythm AFLUTTER  was reviewed with the receiving nurse. Lines:   CVC Double Lumen 11/10/23 (Active)   Central Line Being Utilized Yes 11/12/23 2000   Criteria for Appropriate Use Hemodynamically unstable, requiring monitoring lines, vasopressors, or volume resuscitation 11/12/23 2000   Site Assessment Clean, dry & intact 11/12/23 2000   Phlebitis Assessment No symptoms 11/12/23 2000   Infiltration Assessment 0 11/12/23 2000   Color/Movement/Sensation Capillary refill less than 3 sec 11/12/23 2000   Proximal Lumen Color/Status White; Infusing 11/12/23 2000   Distal Lumen Color/Status [place]; Infusing 11/12/23 2000   Line Care Connections checked and tightened 11/12/23 2000   Alcohol Cap Used Yes 11/12/23 2000   Date of Last Dressing Change 11/10/23 11/12/23 2000   Dressing Type Transparent w/CHG gel 11/12/23 2000   Dressing Status Clean, dry & intact 11/12/23 2000   Dressing Intervention Dressing changed;New 11/11/23 2000       Pulmonary Artery Cath Single 11/10/23 (Active)   Central Line Being Utilized Yes 11/12/23 2000   Site Assessment Clean, dry & intact 11/12/23 2000   External catheter length (cm) 54 cm 11/12/23 2000   Lumen Color/Status Infusing;Yellow; White;Blue 11/12/23 2000   Line Care Connections checked and tightened 11/12/23 2000   Alcohol Cap Used Yes 11/12/23 2000   Date of Last Dressing Change 11/10/23 11/12/23 2000   Dressing Type Transparent w/CHG gel 11/12/23 2000   Dressing

## 2023-11-13 NOTE — PROGRESS NOTES
Pharmacist Note - Vancomycin Dosing  Therapy day 2  Indication: bloodstream infection  Current regimen: 1250 mg IV Q24H    Recent Labs     11/11/23  0437 11/11/23  1036 11/12/23  0410 11/12/23  1802 11/13/23  0506 11/13/23  0511   WBC 13.8*  --  16.0*  --  11.2*  --    CREATININE 1.79*   < > 1.66* 1.63*  --  1.65*   BUN 18   < > 35* 41*  --  40*    < > = values in this interval not displayed. A random vancomycin level of 13.4 mcg/mL was obtained and from this level, the patient's AUC24 is calculated to be 359 with the current regimen. Goal target range AUC/PAVEL 400-600      Plan: Change to 1750 mg IV Q24H . Pharmacy will continue to monitor this patient daily for changes in clinical status and renal function. *Random vancomycin levels are used to calculate AUC/PAVEL, this level should not be interpreted as a trough. Vancomycin has been dosed using Bayesian kinetics software to target an AUC24:PAVEL of 400-600, which provides adequate exposure for as assumed infection due to MRSA with an PAVEL of 1 or less while reducing the risk of nephrotoxicity as seen with traditional trough based dosing goals.

## 2023-11-13 NOTE — PROGRESS NOTES
Bedside and Verbal shift change report given to [address] (oncoming nurse) by Davina Merida (offgoing nurse). Report included the following information Nurse Handoff Report and Recent Results. 2012 Pt spontaneously awake, coughing, attempting to sit up. 1 mg versed and 0.5 mg dilaudid given, prop gtt increased to 50. RT at bedside suctioning ETT, scant secretions. During this episode, Pt became tachycardic, hypertensive, and SpO2 as low as 78%     2018 Call placed to Dr. Lexii Méndez to come to bedside due to Pt's SpO2 remaining low after calming down, 79-81%    2025 Dr. Lexii Méndez at bedside, updated on Pt status. Order to start inhaled nitric oxide at 30 ppm, RT to set up. 2057 Dr. Lexii Méndez still at bedside, SpO2 90%, peep adjusted 12-->10    0615 New air leak noted in chest tube cannister. Dr. Lexii Méndez notified, orders to drop peep 10-->8    0700 Ko Hdez at bedside, on the phone with Dr. Audrey Rueda. Discussing overnight events, plan of care.

## 2023-11-13 NOTE — PROGRESS NOTES
Physical Therapy:   11/13/23    Chart reviewed, patient received sedated and on vent. Per ABCDEF protocol, will work with patient when PEEP is 10.0 or less, FIO2 60% or less, and patient is following basic commands. Will follow patient peripherally. Recommend nursing to complete with patient, as able and per protocol, in order to promote cardiopulmonary systems, maintain strength, endurance and independence:   -bed in chair position or maximize full reverse Trendelenburg position to facilitate upright activity with foot board and non-skid footwear on 3x/day ~30-60 mins each   -ROM during bathing B UEs and LEs  -positioning to prevent contractures and edema. RASS -1/0/+1 (during SAT) Active ROM  Sitting (bed in chair position)  Standing (reverse Trendelenburg)  ADLs   RASS -3/2 Passive ROM  Sit (bed in chair position)   RASS -5/-4 Passive ROM       Addendum: spoke to CTS team who recommend PT sign off. Team anticipates patient will be inappropriate for PT intervention for extended period and pending possible transfer to another hospital for lung transplant. Will sign off per CTS team recommendation. Thank you for your assistance.    Chelsy Miller, PT, DPT

## 2023-11-13 NOTE — ANESTHESIA POSTPROCEDURE EVALUATION
Department of Anesthesiology  Postprocedure Note    Patient: Nancy Vidales  MRN: [id number]  YOB: 1975  Date of evaluation: 11/13/2023      Procedure Summary     Date: 11/13/23 Room / Location: [facility] HEART OR [address] [place] [address] OPEN HEART    Anesthesia Start: 0845 Anesthesia Stop: 5860    Procedure: VENOVENOUS EXTRA CORPOREAL MEMBRANE OXYGENATION, ASHLEIGH BY DR Rufus Jason (Right: Neck) Diagnosis:       Heart failure, unspecified HF chronicity, unspecified heart failure type ([address])      (Heart failure, unspecified HF chronicity, unspecified heart failure type ([address]) [I50.9])    Providers: Aime Muro MD Responsible Provider: Lisbet Sepulveda MD    Anesthesia Type: General ASA Status: 5 - Emergent          Anesthesia Type: General    Radha Phase I:      Radha Phase II:        Anesthesia Post Evaluation    Patient location during evaluation: ICU  Patient participation: complete - patient cannot participate  Level of consciousness: sedated and ventilated  Pain score: 0  Airway patency: patent  Nausea & Vomiting: no nausea and no vomiting  Complications: no  Cardiovascular status: hemodynamically stable  Respiratory status: acceptable (SpO2 normalized with VV ECMO)  Hydration status: euvolemic  There was medical reason for not using a multimodal analgesia pain management approach. Pain management: adequate

## 2023-11-13 NOTE — PROGRESS NOTES
Nutrition    Pt screened for NPO x 3 while on vent. S/p ECMO placement this AM.   Discussed with RN. May try and extubate later today, so no plans to start nutrition support today. Will [place] back in AM with tube feeds recs if remains intubated. O/w advance diet as able and RD will see per policy.     Ana Luisa Malave RD  Available via [employer]

## 2023-11-13 NOTE — ANESTHESIA PROCEDURE NOTES
Central Venous Line:    A central venous line was placed using ultrasound guidance, in the OR for the following indication(s): central venous access and CVP monitoring. 11/13/2023 9:20 AM11/13/2023 9:27 AM    Sterility preparation included the following: hand hygiene performed prior to procedure, maximum sterile barriers used and sterile technique used to drape from head to toe. The patient was placed in supine position. The left internal jugular vein was prepped. The site was prepped with Chloraprep. A 9 Fr (size), 12 (length), introducer double lumen was placed. During the procedure, the following specific steps were taken: target vein identified, needle advanced into vein and blood aspirated and guidewire advanced into vein. Intravenous verification was obtained by ultrasound and venous blood return. Post insertion care included: all ports aspirated, all ports flushed easily, guidewire removed intact, line sutured in place and dressing applied. During the procedure the patient experienced: patient tolerated procedure well with no complications. Outcomes: uncomplicated  Real-time US image taken/store: yes  Anesthesia type: local..No  Staffing  Performed: Anesthesiologist   Performed by: Selena Morris MD  Authorized by:  Selena Morris MD    Preanesthetic Checklist  Completed: patient identified, IV checked, site marked, risks and benefits discussed, surgical/procedural consents, equipment checked, pre-op evaluation, timeout performed, anesthesia consent given, oxygen available, monitors applied/VS acknowledged, fire risk safety assessment completed and verbalized and blood product R/B/A discussed and consented

## 2023-11-13 NOTE — ANESTHESIA PROCEDURE NOTES
Procedure Performed: ASHLEIGH       Start Time:  11/13/2023 9:40 AM       End Time:   11/13/2023 10:35 AM    Preanesthesia Checklist:  Patient identified, IV assessed, risks and benefits discussed, monitors and equipment assessed, procedure being performed at surgeon's request and anesthesia consent obtained. General Procedure Information  Diagnostic Indications for Echo:  assessment of surgical repair  Location performed:  OR  Intubated  Bite block not placed  Heart visualized  Probe Insertion:  Easy  Probe Type:  Mulitplane  Modalities:  2D, color flow mapping and continuous wave Doppler    Echocardiographic and Doppler Measurements    Ventricles    Right Ventricle:  Cavity size dilated. Hypertrophy not present. Thrombus not present. Global function mildly impaired. Left Ventricle:  Hypertrophy not present. Thrombus not present. Global Function normal.  Ejection Fraction 55%. Ventricular Regional Function:  1- Basal Anteroseptal:  normal  2- Basal Anterior:  normal  3- Basal Anterolateral:  normal  4- Basal Inferolateral:  normal  5- Basal Inferior:  normal  6- Basal Inferoseptal:  normal  7- Mid Anteroseptal:  normal  8- Mid Anterior:  normal  9- Mid Anterolateral:  normal  10- Mid Inferolateral:  normal  11- Mid Inferior:  normal  12- Mid Inferoseptal:  normal  13- Apical Anterior:  normal  14- Apical Lateral:  normal  15- Apical Inferior:  normal  16- Apical Septal:  normal  17- [place]:  normal    Wall Motion Comments:       Improved LV function- EF now 50-55. RV dilation with mild RV hypokinesis especially of the free wall    Valves    Aortic Valve: Annulus normal.  Stenosis not present. Regurgitation none. Leaflets normal.  Leaflet motions normal.      Mitral Valve: Annulus normal.  Stenosis not present. Regurgitation mild. Leaflets normal.  Leaflet motions normal.      Tricuspid Valve: Annulus dilated. Stenosis not present. Regurgitation mild.   Leaflets normal.  Leaflet motions normal.

## 2023-11-14 ENCOUNTER — HOSPITAL ENCOUNTER (OUTPATIENT)
Facility: HOSPITAL | Age: 48
Discharge: HOME | End: 2023-11-16
Attending: THORACIC SURGERY (CARDIOTHORACIC VASCULAR SURGERY)

## 2023-11-14 PROBLEM — N17.9 AKI (ACUTE KIDNEY INJURY) (HCC): Status: ACTIVE | Noted: 2023-01-01

## 2023-11-14 PROBLEM — R74.8 ELEVATED LIVER ENZYMES: Status: ACTIVE | Noted: 2023-01-01

## 2023-11-14 NOTE — PROGRESS NOTES
Pharmacist Note - Vancomycin Dosing  Therapy day 3  Indication: bloodstream infection  Current regimen: 1750 mg IV Q24H    Recent Labs     11/13/23  0506 11/13/23  0511 11/13/23  1327 11/14/23  0402   WBC 11.2*  --  12.4* 11.4*   CREATININE  --  1.65* 1.48* 1.45*   BUN  --  40* 38* 39*       A random vancomycin level of 14.8 mcg/mL was obtained and from this level, the patient's AUC24 is calculated to be 379 with the current regimen. Goal target range AUC/PAVEL 400-600      Plan: Change to 1000 mg IV Q12H . Pharmacy will continue to monitor this patient daily for changes in clinical status and renal function. *Random vancomycin levels are used to calculate AUC/PAVEL, this level should not be interpreted as a trough. Vancomycin has been dosed using Bayesian kinetics software to target an AUC24:PAVEL of 400-600, which provides adequate exposure for as assumed infection due to MRSA with an PAVEL of 1 or less while reducing the risk of nephrotoxicity as seen with traditional trough based dosing goals.

## 2023-11-14 NOTE — OP NOTE
[facility]  OPERATIVE REPORT    Name:  Laurel Davidson  MR#:  [id number]  :  1975  ACCOUNT #:  [de-identified]  DATE OF SERVICE:  11/10/2023    PREOPERATIVE DIAGNOSES:  1.  Large bullous emphysema. 2.  Acute type A aortic dissection, with involvement of the aortic root. POSTOPERATIVE DIAGNOSES:  1.  Large bullous emphysema. 2.  Acute type A aortic dissection, with involvement of the aortic root. PROCEDURE PERFORMED:  1. Repair of type A ascending aortic dissection with valved conduit with bioprosthetic #29 valve, #32 graft, with circulatory arrest and open distal anastomosis. 2.  Right axillary cutdown and exposure with #10 Dacron graft and selective antegrade cerebral perfusion. SURGEON:  Monica Dudley MD    ASSISTANT:  1. Anival Keita PA-C    ANESTHESIA:  General endotracheal.    ANESTHESIOLOGIST:  Dr. Monisha Pepe. COMPLICATIONS:  None. SPECIMENS:  None. IMPLANTS:  #29 Konect valved conduit with aortic graft and bioprosthetic aortic valve replacement. ESTIMATED BLOOD LOSS:  500 mL. INDICATIONS:  The patient is a 75-year-old gentleman who presented to [facility].  We were notified at approximately 1:30 in the morning of an ascending aortic dissection. He was transferred to [facility] for repair of the aortic dissection. PROCEDURE:  He was prepped and draped in a sterile fashion. A time-out was performed. An incision was made first under the right clavicle. The axillary artery was exposed and incised. Dacron graft was sewn to the axillary artery after 5000 units of heparin was administered to the body. We performed the anastomosis with no issue. We then attached a connector for the aortic line of the cardiopulmonary bypass machine. I then performed a median sternotomy. I administered heparin for cardiopulmonary bypass. I then opened the pericardium widely and laterally.   One thing we noticed was that
directed towards the tricuspid. Under ASHLEIGH guidance, we were able to see the inflow and color Doppler had adequate flow across the tricuspid valve. We then secured the cannula in place at multiple positions. He was able to achieve saturations of 100% almost instantly on a flow of 4 L. The patient was then transported to CCU. The PA was critical for all facets of the procedure, including rewiring the existing central line, positioning the cannula, securing the cannula, and transport. Dr. Ashley Santamaria and Dr. Tad Mullen were available as well as Dr. Nisa Kaba for input on the positioning and orientation of the cannula.       Bonny Christianson MD      [id number]  D:  11/13/2023 12:33  T:  11/13/2023 14:14  JOB #:  [id number]

## 2023-11-14 NOTE — PROGRESS NOTES
2000: Bedside and Verbal shift change report given to SAMI Gusman (oncoming nurse) by Joseph Villarreal RN (offgoing nurse). Report included the following information Nurse Handoff Report, Index, ED Encounter Summary, Surgery Report, Intake/Output, MAR, Recent Results, and Cardiac Rhythm a. flutter . ECMO:   Pump flow: 4  Pump speed: 3800  Sweep: 1  FdO2: 100%    Drips:   Amiodarone @ 05 mg/min  Bival @ 0.03 mg/kg/hr  Precedex @ 1 mcg/kg/hr  Dobutamine @ 1 mcg/kg/hr  Fentanyl @ 150 mcg/hr  Ketamine @ 025 mg/kg/hr  Cardene @ 10 mg/hr    2130: Patient waking up calmly, but unable to follow commands. SBP: 160s. Spoke with Dr. Reginald Jimenez (Intensivist). Orders to give 10 mg IV hydralazine. 2145: Patient awaking much more agitated. SBP: 170s, asynchronous with ventilator. Peak pressures in 40s, O2 sat: 83-84%. Patient also not able to follow commands. Prn versed given. 1296: Patient waking up agitated. SBP: 150-160s. O2 sats dropping to 89-90%. Prn versed given for agitation. 0700: Dr. Mirian Aguirre at bedside for patient updates and discussion of plan of care. Adjusted vent settings. Rate decreased to 10 and tidal volume decreased to 350. FiO2 decreased to 50 %    0800: Bedside and Verbal shift change report given to Gerardo Bueno RN (oncoming nurse) by SAMI Gusman (offgoing nurse). Report included the following information Nurse Handoff Report, Index, ED Encounter Summary, Surgery Report, Intake/Output, MAR, Recent Results, and Cardiac Rhythm NSR .

## 2023-11-14 NOTE — PROCEDURES
ECMO Management Note Day #2    Cannula: 31 Fr Dual lumen Bi-Caval Catarina cannula in RIJ (1cm insertion to end of silver rings), Placed by Dr. Victoria Never    Mode: V-V    Sweep: 4-8L, FdO2 100%    Flow: 4.3 L @ 4100 RPM's    Oxygenator pressure delta: 60    Anticoagulation: Bival   - PTT: 46 (goal 55-75)    Circuit Inspection: Circuit inspected w/o visible clot or air in circuit, connections zip tied, pump-head well seated. Euroset oxygenator free of visible clot or air, attached to heater/cooler. POCUS w/ difficulty visualizing return jet, tip of cannula in intrahepatic IVC    ECMO Run Significant Events:  - 11/13: Pt cannulated for V-V ECMO  - 11/14 Extubated    Past 24hrs  - extubated to HFNC    Assessment/Plan:  - Pt cannulated due to hypoxemia and bilateral PTX w/ marked air leak for full lung rest   - Extubated today, Chest tubes on water seal  - Adjust Sweep to minimize WOB/air hunger  - monitor for gross re-circulation given difficulty w/ POCUS this AM  - Discussed w/ RN and perfusionist at bedside    - See full daily note for further plan    Apolinar Obando MD  Staff [phone] [employer]

## 2023-11-14 NOTE — PROGRESS NOTES
Comprehensive Nutrition Assessment    Type and Reason for Visit: Initial, NPO/Clear Liquid    Nutrition Recommendations/Plan:     If unable to extubate, recommend initiating TF of Vital 1.5 to goal of 45 mL/hr  Give 1 packet Prosource BID  Flushes of 150 mL H2o q 4 hours - adjustments per MD    If able to extubate and requires DHT: Vital 1.5 to goal of 60 mL/hr with 1 packet Prosource daily and flushes of 150 mL H2O q 4 hours         Malnutrition Assessment:  Malnutrition Status:  Insufficient data (11/14/23 1027)         Nutrition Assessment:    PMHx includes COPD, graves disease, PTSD  Pt admitted w an acute type a dissection for emergent surgical intervention. S/p emergent AVR, Ao Root/aneurysm repair/replace 11/10. Post op course c/b COPD w/ bullous emphysema, bl PTX  Placed on VV ECMO 11/13. Pt screened for NPO x 4, remains vented. Off pressors. Off insulin drip. Propofol @ 13.9 mL/hr providing 367 kcal.  Noted mild hypernatremia, o/w lytes OK. BG better controlled today, no hx of DM. Last BM PTA. Noted possible extubation today. If unable to extubate, recommend initiation of tube feeds. Goal Vital 1.5 @ 45 mL/hr with 1 packet Prosource BID and flushes of 150 mL H2o q 4 hours provides 990 mL, 1645 kcal, 107 gm pro, 185 gm CHO, and 752+120+819=0355 mL free H2O. With propofol = 2012 kcal.  This meets 96% of kcal needs and 100% of pro needs respectively. Discussed with RN, even if extubated, will likely require DHT. EEN based off 25-30 kcal/kg/day (3006-4845 kcal)  TF goal off vent is Vital 1.5 @ 60 mL/hr with 1 packet Prosource daily and flushes of 150 mL H2O q 4 hours to provide 1320 mL, 2060 kcal, 109 gm pro, 247 gm CHO, and 1000+60+014=8583 mL free H2O.       Nutritionally Significant Medications:  Pepcid, lasix, lantus 10 units, SSI, mag ox, zosyn, glycolax, senokot, vancomycin  Drips: amiodarone, bival, precedex, dilaudid, ketamine, cardene, propofol  PRN: dilaudid, versed       Estimated

## 2023-11-14 NOTE — PROCEDURES
ECMO Initiation Note     Cannula: 31 Fr Dual lumen Bi-Caval [place] cannula in RIJ (1cm insertion to end of silver rings), Placed by Dr. Varinder Apodaca    Mode: V-V    Sweep: 1L, FdO2 100%    Flow: 4 L @ 3800 RPM's    Anticoagulation: Heparin for Cannulation, Bival for circuit AC   - PTT: 38 (goal 55-75)    Circuit Inspection: Circuit inspected w/o visible clot or air in circuit, connections zip tied, pump-head well seated. Euroset oxygenator free of visible clot or air, attached to heater/cooler. ECHO w/ return jet aimed at Tricuspid valve, tip of cannula in intrahepatic IVC    ECMO Run Significant Events:  - 11/13: Pt cannulated for V-V ECMO    Overnight Events:  - NA    Assessment/Plan:  - Pt cannulated due to hypoxemia and bilateral PTX w/ marked air leak for full lung rest   - Minimize PEEP and TV, place Chest tubes on water seal  - Goal extubation vs early trach to remove PPV component of injury  - Expect pt will require increased support post extubation, monitor blood gasses, adjust sweep/flow as indicated  - Discussed w/ RN and perfusionist at bedside    - See full daily note for further plan    Apolinar Wheeler MD  Staff [address] [employer]

## 2023-11-14 NOTE — PROGRESS NOTES
[facility]    ICU TEAM Progress Note    Name: Tor Najjar   : 1975   MRN: [id number]   Date: 2023        Reason for ICU Admission: Type A aortic dissection repair, post-op care     HPI: 48M txfr from OSH for emergent Type A aortic dissection repair. Patient arrives to CVICU intubated, sedated for routine post-op care. History from records, providers. Cough, dyspnea for 1-2 wks, p/w sudden band-like chest pain, dyspnea, near syncope. W/u found w/ Type A aortic dissection w/ dilated aortic root with aortic aneurysm extending to the abdomen. Esmolol started. Pt txfr'd to [facility] for emergent surgical repair. Surgery lasted approximately 12 hours, entered OR ~0400, arrived to CVICU ~1600 hrs. OR course complicated by bleeding, rec'd multiple products, VF episode requiring DCCV x 1. Past 24hrs: Failed awakening trials overnight o/n 2/2 HTN and tachypnea, pt starated on cardene. Extubated to HFNC in AM, awake and following some commands. On precedex and ketamine, PRN dilaudid. Remains on ECMO support. Expiratory air leak noted in L chest and mediastinal drains. On Bival. Good UOP w/ resumed diuresis    Hospital course:   - adequate UOP, wakes agitated, +/- redirectable, follows commands.  - CI improved, remains on Epi, Dobuta, Cardene, good UOP.  Desats occaisonally, CXR stable   ECMO cannulation due to hypoxia and L PTX w/ persistent air leak     Assessment/Plan:     Type A Aortic dissection extending to abdominal aorta  S/p emergent AVR, Ao Root/aneurysm repair/replace 11/10 Julien Never  Circ arrest 36 min; Mannitol, 1gm SoluMed  Wakes, nonfocal, agitated, +/- redirectable  OR course c/b:   Episode VF w/ DCCV x 1  Bleeding, Rec'd 3 PRBC, 2 FFP, 1 plt, 1 cryo, TXA, Kcentra, DDAVP, NovoSeven intra-/kellen-op  Goal Platelets > 50  Amio, insulin gtt''s  Goal SBP < 130, MAP > 65  Cadene  Diuresing well: lasix bid  COPD w/ bullous emphysema, bl PTX, r/o PNA  Placed on VV

## 2023-11-14 NOTE — PROGRESS NOTES
Spiritual Care Assessment/Progress Note  [facility]. Nguyen    Name: Sylvie Jarquin MRN: [id number]    Age: 46 y.o. Sex: male   Language: English     Date: 11/14/2023            Total Time Calculated: 31 min              Spiritual Assessment begun in [facility] CV INTNS[facility] CARE  Service Provided For[de-identified] Family     Encounter Overview/Reason : Spiritual/Emotional Needs, Initial Encounter    Spiritual beliefs:      [] Involved in a armen tradition/spiritual practice:      [] Supported by a armen community:      [] Claims no spiritual orientation:      [] Seeking spiritual identity:           [x] Adheres to an individual form of spirituality:      [] Not able to assess:                Identified resources for coping and support system:   Support System: Family members       [] Prayer                  [] Devotional reading               [] Music                  [] Guided Imagery     [] Pet visits                                        [x] Other: (Family)     Specific area/focus of visit   Encounter:    Crisis:    Spiritual/Emotional needs: Type: Spiritual Support, Emotional Distress  Ritual, Rites and Sacraments:    Grief, Loss, and Adjustments: Type: Life Adjustments  Ethics/Mediation:    Behavioral Health:    Palliative Care: Advance Care Planning:           Narrative: Referral source:   Ridge Morgan at [address] in [facility] CV [facility]NS[facility]. I reviewed the medical record prior to this encounter. Spiritual Assessment: Family Care    Sylvie Jarquin had a large family presence (mother, father, aunt, sister, step-parents, and more)in the waiting area as he was being prepped for visitation after surgery. All have come to support Sandra Coates from different location of the [facility] to provide support. Thus far, they appear optimistic that Sandra Coates will have good recovery. They shared family stories. No spiritual need noted.      Outcome: Ridge Morgan verbally expressed appreciation for today's visit, spiritual support and the opportunity to share their inner thoughts and feelings. Plan of Care:  advised Janis Morgan Family of continued spiritual support as needed. The  on-call can be reached at [phone]). [facility]  [facility] [address] [place] [address] paging Service [phone])

## 2023-11-14 NOTE — PROGRESS NOTES
[facility] FLOOR Progress Note    Admit Date: 11/10/2023  POD: 1 Day Post-Op      Procedure:    11/13/2023 with Dr. Anamika Leiva, ASHLEIGH BY DR Cata Douglass    11/10/2023 with Dr. Maggie Narayanan:  RIGHT AXILLARY CUTDOWN [address]; AVR, ROOT AND ASCENGING AORTIC ANEURYSM REPAIR WITH 29MM KONECT RESILIA  AORTIC VALVED CONDUIT; ECC; CIRCULATORY ARREST; ASHLEIGH & EPIAORTIC US BY DR. Loy Harmon & DR. CANTU [facility] Synopsis:  POD0 11/10: Ascending aortic aneurysm repair and AVR with aortic valved conduit. Vfib arrest requiring cardioversion and 20 sec of CPR. Transferred to CVICU on Pneylephrine, Amiodarone, Vasopressin, Levophed, precedex and insulin. LVEF reported as Low normal, with dyskinetic thj-mznytfuseefxg-sdyyqiafnaqq wall motion in post op ASHLEIGH. Pt hypovolemic. Albumenx4, FFP, platelets, 1L LR. Neuro check moved all extremities, nodded appropriately. Wean levo, Vaso and Epi added  POD1 11/11:  Febrile. On Epi. FiO2 60% increased to 80%, ACVC PEEP 10. Nicardipine for SBP ,120. PO amio and lipitor held for elevated liver enzymes. Albuminx2. Tylenol for fever. POD2 11/12: PEEP increased to 12, FiO2 to 70% by intensivist. ET suctioned thick secretions. Pt spontaneously awakening with increasing sedation requirements. Frequent desaturations overnight. Tmax 102. On Epi with plans to transition to dobutamine. POD3 11/13: Increasing ventilation and sedation requirements. FiO2 100%. PEEP of 8 for bollous emphysema. Off Epi. Dobut 2.5 mcg. Brought to OR by Dr. Maggie Narayanan for placement of VV ECMO. Post op ASHLEIGH LVEF 55% with normal global function. SBP goal 120. Transfuse pltlts and 1 unit RBC. POD4 11/14: VV ECMO flows stable. Difficulty with extubation due to hypertension, SpO2 desat, and agitation with weaned sedation. Switch to dilaudid instead of propofol. Extubation trial today. Plan for trach tomorrow. Resume diureses today.        Subjective/overnight events:   Pt seen rounding with  S/P ascending aortic aneurysm repair    S/P aortic valve replacement    Aortic arch dissection (HCC)    Aortic dissection (HCC)    Respiratory failure (HCC)    Hyperglycemia           Plan/Recommendations/Medical Decision Making:     Type A Dissection. 11/10 Emergent Ascending Aortic Aneurysm and Aortic Root repair with a 29 mm KR Tissue Aortic Valve Conduit. Circ arrest 36 min. Episode VF w/ DCCV x 1  Dobutamine off. Consider Milrinone for CO/CI with increased pulmonary dilations. Goal CI 2-3. Bedside ECHO showed normal RV and LV function. Continues to respond positively to albumin. Goal CVP 12. Currently met. Titrate Nicardipine to maintain systolic BP < 669. Keep CT. Continue to monitor closely. CT  revealing L pleural air leak and higher CTOP. A-Fib, post procedure;   Continue IV Amio and titrate to protocol. Restart PO Amio. Previously held for elevated liver enzymes in addition to Lipitor. Goal to start PO in order to transition off Amio gtt. Acute Post operative Respiratory Failure on Chronic respiratory insufficiency        Current smoker; COPD w/bullous emphysema   VV ECMO Flows stable. FiO2 100%. Sweep 1.5. Increase sweep to 3 for elevated CO2. Plan for VV ECMO as bridge to transplant or recovery. Vent PEEP 4, FiO2 40%. [address]. Current plan to trial extubation. Currently limited by agitation, hypertension and desaturation. Do not increase PEEP above 10 to avoid alveolar rupture. Increase Fi02 as needed. Continue Nebs and acetylcysteine (MUCOMYST)  Continue gentle diuresis Lasix 20 mg IV BID. Increase if unresponsive this morning  Acute Right Ventricular Failure  RV function decreased on intra-op ASHLEIGH during VV ECMO. Repeat bedside echo 11/14 show normal RV function  Off Dobutamine. CVP stable. Consider transition to milrinone for inotropy and pulmonary vasodilation if needed  Acute post operative sedation/pain management. Current sedation with Dex, Prop, and Fent.

## 2023-11-14 NOTE — PROGRESS NOTES
0800- bedside report received, patient intubated, sedated, on VV ECMO. Drups: amio 0.5 mcg, precedex 1.5 mcg, propofol 40 mcg, ketamine 0.45 mg, fentanyl 100 mcg, cardene 15 mg. Vent: AC/VC 10, 350, 70%, 4.    0805- Kathrine BLACK, Susan Lopez NP, Cyndie Mayers MD at bedside discussing plan. Will change from fent to dilaudid. Viet Gustafson MD at bedside for [address] Possible extubation today, patient breathing over vent consistently, no command following at this time, patient does move all extremities and responds to touch stimulus. Viet Gustafson changed vent to SPONT. Patient breathing 10-14, VSS.    0945- weaning sedation as tolerated. PTT 41, per Elena Dickerson MD increase drip by 50%. 2261- family at bedside; update given. 9073- patient responding to families voice; turning head towards them and opening eyes; BP also increasing, tachycardic, tachyonic, no other command following. Restarted cardene and increased sedation for comfort. 1018- ABG completed; messaged sent to Elena Dickerson MD.    [address] MD at bedside; orders to extubate to hi-flow, RT notified. 1040- propofol weaned off.    1045- patient suctioned and extubated to hi alexsandra 80%, 40 L. Patient able to cough, and move extremities. Concern for left facial droop, Elena Dickerson MD at bedside. No orders at this time. Precedex decreased again to 0.5 mcg. 1050- fentanyl weaned off.    1105- dilaudid infusion started. 1115- sweep increased to 6 for increased work of breathing (was 4 now 6)/    1200- ABG performed; perfusionist decreased sweep to 5 for CO2.    1155- family at bedside; questions and concerns addressed. Emotional support provided. 1308- ABG performed; pH 7.52, CO2 32, pO2 53, perfusionist increased sweep to 6. Dilaudid also increased for comfort. 1410- patient coughing, attempted to suctioned and did not obtain any sputum. Patient asking for \"ice cream\".  Informed him that he needs to be evaluated by speech first before he can

## 2023-11-14 NOTE — DIABETES MGMT
[facility]  DIABETES MANAGEMENT CONSULT    Consulted by Provider for advanced nursing evaluation and care for inpatient blood glucose management. Evaluation and Action Plan   This 46year old AA male was transferred from [facility] 11/9/23 to [facility] when AA noted for emergent intervention. Patient underwent repair of AA 11/10/23. Recovering in ICU. ECMO placement 11/12/23. Had been intubated, sedated and on vent support, along with multiple drugs to support rhythm and BP yesterday 11/13/23; now extubated this morning. Remains on Angiomax, amio, ketamine & Cardene infusions. Sedation+. Concern for facial droop noted after extubation. May have trach placement tomorrow. As for BG management, patient did not have diabetes PTA. Insulin needs trended down from a high of 18 units/hr to <3 units/hr at the time of transition off insulin infusion 11/12/23; given 15 units of Lantus insulin. Was on corrective insulin until a small dose of basal insulin was added yesterday 11/13/23. Abit of correction insulin required to get FBG to 149. Will slightly increase basal insulin dose. Management Rationale Action Plan   Medication   Basal needs Based on  [x]        Blood glucose pattern  []        Weight & BMI  [x]        Kidney dysfunction  []        Home diabetes regimen     Increase Lantus insulin to 12 units D   Nutritional needs Based on  []        Blood glucose pattern  []        CHO load  []        Enteral feeding CHO load  []        TPN/PPN dextrose load     NA   Corrective insulin Based on sensitivity  [x]        Low   []        Medium  []        High      Additional orders             Initial Presentation   Eduardo Gallardo is a 46 y.o. male transferred 11/9/23 from [facility] ER after experiencing sudden onset of chest pain with radiation across chest in a band & dyspnea. Felt transient tingling to his bilateral feet and hands; some ongoing tingling to his right volar forearm. NSR with right BBB. events  11/10/23 Admission  => insulin infusion  11/12/23 Transitioned off insulin infusion with 15 units of Lantus insulin when insulin needs down to <2.7 units/hr   11/13/23 . KARINE+  11/14/23 . KARINE+. Assessment and Nursing Intervention   Nursing Diagnosis Risk for unstable blood glucose pattern   Nursing Intervention Domain 4553 Decision-making Support   Nursing Interventions Examined current inpatient diabetes/blood glucose control   Explored factors facilitating and impeding inpatient management  Explored corrective strategies with patient and responsible inpatient provider   Informed patient of rational for insulin strategy while hospitalized     Billing Code(s)   [] 76958 IP subsequent hospital care - 50 minutes   [x] 45422 IP subsequent hospital care - 35 minutes   [] [phone] IP subsequent hospital care - 25 minutes   [] [id number] IP initial hospital care - 40 minutes     Before making these care recommendations, I personally reviewed the hospitalization record, including notes, laboratory & diagnostic data and current medications, and examined the patient at the bedside (circumstances permitting) before determining care. More than fifty (50) percent of the time was spent in patient counseling and/or care coordination.   Total minutes: [address] [facility], APRN - CNS  Clinical Nurse Specialist - Diabetes & endocrine disorders  [facility]  Access via [place] [place] [facility]

## 2023-11-14 NOTE — CARE COORDINATION
Care Management Initial Assessment       RUR:  Readmission? No  1st IM letter given? No  1st  letter given: No     11/14/23 1115   Service Assessment   Patient Orientation Alert and Oriented   Cognition Alert   History Provided By Patient   Primary William Dailey Family Members   Patient's Healthcare Decision Maker is: Legal Next of [facility]   PCP Verified by CM Yes   Last Visit to PCP Within last 3 months   Prior Functional Level Independent in ADLs/IADLs   Current Functional Level Independent in ADLs/IADLs   Can patient return to prior living arrangement Unknown at present   Ability to make needs known: Good   Family able to assist with home care needs: Yes   Social/Functional History   Lives With [facility] Help From Family   ADL Assistance Independent   Ambulation Assistance Independent   Transfer Assistance Independent   Active  Yes   Mode of Transportation Car   Occupation Full time employment   Discharge Planning   Type of Residence House   Living Arrangements Family Members   Current Services Prior To Admission None   Potential Assistance Needed N/A   DME Ordered? No   Potential Assistance Purchasing Medications Yes   Patient expects to be discharged to: House   History of falls? 0   Services At/After Discharge   Transition of Care Consult (CM Consult) [facility] Discharge None   [place] Resource Information Provided? No   Mode of Transport at Discharge   (Family transport)   Confirm Follow Up Transport Family   Condition of Participation: Discharge Planning   The Patient and/or Patient Representative was provided with a Choice of Provider? Patient   The Patient and/Or Patient Representative agree with the Discharge Plan?  Yes   Freedom of Choice list was provided with basic dialogue that supports the patient's individualized plan of care/goals, treatment preferences,

## 2023-11-15 NOTE — PROGRESS NOTES
2000: Bedside and Verbal shift change report given to SAMI Gusman (oncoming nurse) by Misha Piper RN (offgoing nurse). Report included the following information Nurse Handoff Report, Index, Intake/Output, MAR, Recent Results, and Cardiac Rhythm NSR .     2015: ABG results:     pH: 7.47 / CO2: 31.5 / pO2: 52 / HCO3: 23 / . No changes to ECMO settings    2202: ABG results:    pH: 7.52 / CO2: 33.5 / pO2: 50 / HCO3: 27.1 No changes to ECMO settings    0008: ABG results:     pH: 7.5 / CO2: 33.8 / pO2: 51 / HCO3: 26.3 no changes to ECMO settings    0100: O2 sats remain low 80s. RR: 35-40. Asked Intensivist to come to bedside to assess patient. Spoke with RT, to increase O2 on high flow. ABG results: pH: 7.52 / CO2: 31.9 / pO2: 45 / HCO3: 85.5 ECMO flow decreased to 3.5 lpm.    0115: Dr. Dany Randall at bedside. Verbal orders to give prn versed. Will place patient on CPAP    0130: Patient appears more comfortable. RR: 15-18. O2 sats: 88-89%. Will repeat blood gas. 0600: ABG results:     pH: 7.52 / CO2: 30.4 / pO2: 55.6 / HCO3: 24.9. Sweep decreased to 7    0800: Bedside and Verbal shift change report given to Jurgen Adan RN (oncoming nurse) by SAMI Gusman (offgoing nurse). Report included the following information Nurse Handoff Report, Index, ED Encounter Summary, Surgery Report, Intake/Output, MAR, Recent Results, and Cardiac Rhythm NSR .

## 2023-11-15 NOTE — DIABETES MGMT
[facility]  DIABETES MANAGEMENT CONSULT    Consulted by Provider for advanced nursing evaluation and care for inpatient blood glucose management. Evaluation and Action Plan   This 46year old AA male was transferred from [facility] 11/9/23 to [facility] when AA noted for emergent intervention. Patient underwent repair of AA 11/10/23. Recovering in ICU. ECMO placement 11/12/23. Had been intubated, sedated and on vent support, along with multiple drugs to support rhythm and BP 11/13/23; now extubated 11/14/23. Remains on amio & Cardene infusions. Sedation+ with Precedex infusion. Concern for facial droop noted after extubation. May have trach placement. As for BG management, patient did not have diabetes PTA. Insulin needs trended down from a high of 18 units/hr to <3 units/hr at the time of transition off insulin infusion 11/12/23; given 15 units of Lantus insulin. Was on corrective insulin until a small dose of basal insulin was added 11/13/23. Tube feedings have been added, which are running at 40cc/hr with goal of 60cc/hr. Will need to adjust insulin dosing to accommodate. Management Rationale Action Plan   Medication   Basal needs Based on  [x]        Blood glucose pattern  []        Weight & BMI  [x]        Kidney dysfunction  []        Home diabetes regimen     Stop Lantus insulin     Begin NPH insulin 15 units twice daily   Nutritional needs Based on  []        Blood glucose pattern  []        CHO load  [x]        Enteral feeding CHO load  []        TPN/PPN dextrose load     180 grams of CHO/D of Vital when running at 40 cc.hr => need 18 units of insulin to override    When TF reaches goal rate of 60cc/hr, he will be receiving 269 grams of CHO/D => will need 26 units of insulin to override   Corrective insulin Based on sensitivity  [x]        Low   []        Medium  []        High      Additional orders   BG monitoring Q6 hrs          Initial Presentation   Imelda Dias is a 46 y.o. Increasing ventilation and sedation requirements. FiO2 100%. PEEP of 8 for bollous emphysema. Off Epi. Dobut 2.5 mcg. Brought to OR by Dr. Tessy Lim for placement of VV ECMO. Post op ASHLEIGH LVEF 55% with normal global function. SBP goal 130.   23 Intubated & sedated on Precedex & Propofol infusions. On CV vent support Fi02 30%/Peep 4. Remains on , cardene and amio infusions. 23 Facial droop after extubation. Weaning infusions as possible. Possible trach tomorrow. 11/15/23 Off vent but on PAP. Still sedated. Rhythm support. Now on TF. Diabetes History   No personal or family history of diabetes      Subjective   Eyes closed     Objective   Physical exam  General Normal weight male who is ill-appearing  Neuro  Sedated on Precedex infusion  Vital Signs   Vitals:    11/15/23 1100   BP:    Pulse: 68   Resp: 18   Temp:    SpO2: 96%     Extremities No foot wounds    Diabetic foot exam:    Left Foot     Visual Exam: Warm & dry. Normal toe nails    Pulse DP: Trace   Right Foot   Visual Exam: Warm & dry.  Normal toe nails    Pulse DP: +2      Laboratory  Recent Labs     23  1327 23  2219 23  0402 11/15/23  0406   WBC 12.4*  --  11.4* 14.9*   HGB 8.3* 9.5* 8.8* 8.4*   HCT 24.9* 29.0* 26.9* 26.1*   MCV 86.5  --  89.7 90.3   PLT 48*  --  61* 77*       Recent Labs     23  1327 23  0402 11/15/23  0406    148* 153*   K 3.9 3.7 2.9*   * 118* 120*   CO2 26 29 27   BUN 38* 39* 46*   CREATININE 1.48* 1.45* 1.48*       Lab Results   Component Value Date     (H) 11/15/2023     (H) 11/15/2023    ALKPHOS 42 (L) 11/15/2023    BILITOT 2.9 (H) 11/15/2023     Lab Results   Component Value Date    TSH 0.462 2023    IHR5KUI 0.74 11/10/2023      Lab Results   Component Value Date    LABA1C 5.1 11/10/2023    LABA1C 5.5 2019     Factors impacting BG management  Factor Dose Comments   Nutrition:  NPO       Drugs  Amio infusion   In dextrose       Pain Scheduled

## 2023-11-15 NOTE — PROGRESS NOTES
0800: Bedside and Verbal shift change report given to Altagracia GALLARDO (oncoming nurse) by Pam Canela (offgoing nurse). Report included the following information Nurse Handoff Report. ECMO: 100%, Sweep 7, PF 3.87    0930: Hector Diaz at bedside. ECMO cannula pulled back 2cm. 3cm from rings to skin. 1230: 1u PRBC transfused. 1315: Pt placed on Hi Flow. 1345: Called Dr Brant Diaz to bedside for neuro assessment and concern over work of breathing. 1400: ABG: PO2 45. Dr Brant Diaz at bedside - pt placed back on BiPAP    2000: Bedside and Verbal shift change report given to Diony Cummins (oncoming nurse) by Christiano Chen (offgoing nurse). Report included the following information Nurse Handoff Report.

## 2023-11-15 NOTE — PROGRESS NOTES
Referral source:   Radha Morgan at [facility] in [facility] [place] 4 CV INTNS CARE. I reviewed the medical record prior to this encounter. Spiritual Assessment: Family support    Encountered family members of  Ailyn Wagner in waiting room. They shared about Naga's current medical status and talked about how at ease they felt that he was stable. No spiritual needs noted. Outcome: Ailyn Wagner family verbally expressed appreciation for today's visit and support. Plan of Care:  advised family members of continued spiritual support as needed. The    [employer].  Andre Morris, [address] [place] [address] paging Service [phone])

## 2023-11-15 NOTE — PROGRESS NOTES
Cardiac Surgery Coordinator- Met with the family at the bedside, encouraged them to verbalize and offered support. Will continue to follow for educational and emotional support.

## 2023-11-15 NOTE — PROCEDURES
ECMO Management Note Day #    Cannula: 31 Fr Dual lumen Bi-Caval [place] cannula in RIJ (3cm insertion to end of silver rings), Placed by Dr. Abelino Gabriel    Mode: V-V    Sweep: 6L, FdO2 100%    Flow: 4.3 L @ 3900 RPM's    Oxygenator pressure delta:     Anticoagulation: Bival   - PTT: 59 (goal 55-75)    Circuit Inspection: Circuit inspected  w/o visible clot or air in circuit, connections zip tied, pump-head well seated. Euroset oxygenator some clot in de-airing line, otherwise free of visible clot or air, attached to heater/cooler. POCUS w/ difficulty visualizing return jet, tip of cannula in intrahepatic IVC    ECMO Run Significant Events:  - 11/13: Pt cannulated for V-V ECMO  - 11/14 Extubated    Past 24hrs  - Cannula placement adjusted under POCUS, pulled back 2cm 2/2 recirculation and return jet in IVC. 1u pRBCs    Assessment/Plan:  - Pt cannulated due to hypoxemia and bilateral PTX w/ marked air leak for full lung rest   - Extubated  - Adjust Sweep to minimize WOB/air hunger  - Discussed w/ RN and perfusionist at bedside    - See full daily note for further plan    Anibal A. Bradly Duane, MD  Staff [phone] [employer]

## 2023-11-16 PROBLEM — E87.0 HYPERNATREMIA: Status: ACTIVE | Noted: 2023-11-16

## 2023-11-16 PROBLEM — Z78.9 ON TUBE FEEDING DIET: Status: ACTIVE | Noted: 2023-01-01

## 2023-11-16 PROBLEM — G47.30 SLEEP APNEA: Status: ACTIVE | Noted: 2023-01-01

## 2023-11-16 NOTE — PROGRESS NOTES
Physical Therapy  11/16/2023    Order received, chart reviewed. Patient currently very anxious when awake and requiring versed. Not appropriate for PT today but will follow and initiate mobility once medically appropriate. Thank you.     Irma Hadley, PT, DPT

## 2023-11-16 NOTE — DIABETES MGMT
[address] [facility]  DIABETES MANAGEMENT CONSULT    Consulted by Provider for advanced nursing evaluation and care for inpatient blood glucose management. Evaluation and Action Plan   This 46year old AA male was transferred from [facility] 11/9/23 to [facility] when AA noted for emergent intervention. Patient underwent repair of AA 11/10/23. Recovering in ICU. ECMO placement 11/12/23. Had been intubated, sedated and on vent support, along with multiple drugs to support rhythm and BP 11/13/23; now extubated 11/14/23; on BiPap. On Angiomax & Cardene infusions. Sedation+ with Precedex infusion. Hypernatremic today so being diuresed and treated with dextrose infusion. As for BG management, patient did not have diabetes PTA. Insulin needs trended down from a high of 18 units/hr to <3 units/hr at the time of transition off insulin infusion 11/12/23; given 15 units of Lantus insulin. Was on corrective insulin until a small dose of basal insulin was added 11/13/23. Tube feedings have been added, which are running at goal of 60cc/hr. Insulin switched from Lantus to NPH insulin yesterday. Will need to adjust insulin dosing to accommodate. Now receiving dextrose infusion to correct hypernatremia. Will monitor to determine if adjustments are needed.     Management Rationale Action Plan   Medication   Basal needs Based on  [x]        Blood glucose pattern  []        Weight & BMI  [x]        Kidney dysfunction  []        Home diabetes regimen     Continue NPH insulin 15 units twice daily   Nutritional needs Based on  []        Blood glucose pattern  []        CHO load  [x]        Enteral feeding CHO load  []        TPN/PPN dextrose load     TF running at goal rate of 60cc/hr  (269 grams of CHO/D)    Corrective insulin Based on sensitivity  [x]        Low   []        Medium  []        High      Additional orders   BG monitoring Q6 hrs          Initial Presentation   Alysha Montes is a 46 y.o. male transferred

## 2023-11-16 NOTE — PROCEDURES
ECMO Management Note Day #3    Cannula: 31 Fr Dual lumen Bi-Caval [place] cannula in RIJ (3cm insertion to end of silver rings), Placed by Dr. Sosa Whatley    Mode: V-V    Sweep: 4L, FdO2 100%    Flow: 4.5 L @ 4200 RPM's    Oxygenator pressure delta: 70    Anticoagulation: Bival   - PTT: 63 (goal 55-75)    Circuit Inspection: Circuit inspected  w/o visible clot or air in circuit, connections zip tied, pump-head well seated. Euroset oxygenator some clot in de-airing line, otherwise free of visible clot or air, attached to heater/cooler. ECMO Run Significant Events:  - 11/13: Pt cannulated for V-V ECMO  - 11/14 Extubated  - 11/15 Cannula placement adjusted under POCUS, pulled back 2cm 2/2 recirculation and return jet in IVC. Past 24hrs  - 1u pRBCs    Assessment/Plan:  - Pt cannulated due to hypoxemia and bilateral PTX w/ marked air leak for full lung rest   - Continue current support  - Adjust Sweep to minimize WOB/air hunger  - Discussed w/ RN and perfusionist at bedside    - See full daily note for further plan    Apolinar Palma MD  Staff [phone] [employer]

## 2023-11-16 NOTE — PROGRESS NOTES
CSS FLOOR Progress Note    Admit Date: 11/10/2023  POD: 3 Days Post-Op      Procedure:    11/13/2023 with Dr. Yair Rodriguez, ASHLEIGH BY DR Gil Lopez    11/10/2023 with Dr. Sophie Szymanski:  RIGHT AXILLARY CUTDOWN [address]; AVR, ROOT AND ASCENGING AORTIC ANEURYSM REPAIR WITH 29MM KONECT RESILIA  AORTIC VALVED CONDUIT; ECC; CIRCULATORY ARREST; ASHLEIGH & EPIAORTIC US BY DR. Antoni Gonzalez & DR. CANTU [facility] Synopsis:  POD0 11/10: Ascending aortic aneurysm repair and AVR with aortic valved conduit. Vfib arrest requiring cardioversion and 20 sec of CPR. Transferred to CVICU on Pneylephrine, Amiodarone, Vasopressin, Levophed, precedex and insulin. LVEF reported as Low normal, with dyskinetic vkp-kwdrpkcrndjfo-rzhrfkziqmkj wall motion in post op ASHLEIGH. Albumenx4, FFP, platelets, 1L LR. Neuro check moved all extremities, nodded appropriately. Wean levo, Vaso and Epi added  POD1 11/11:  Febrile. On Epi. FiO2 60% increased to 80%, ACVC PEEP 10. Nicardipine for SBP ,120. PO amio and lipitor held for elevated liver enzymes. Albuminx2. Tylenol for fever. POD2 11/12: PEEP increased to 12, FiO2 to 70% by intensivist. ET suctioned thick secretions. Pt spontaneously awakening with increasing sedation requirements. Frequent desaturations overnight. Tmax 102. On Epi with plans to transition to dobutamine. POD3 11/13: Increasing ventilation and sedation requirements. FiO2 100%. PEEP of 8 for bollous emphysema. Off Epi. Dobut 2.5 mcg. Brought to OR by Dr. Sophie Szymanski for placement of VV ECMO. Post op ASHLEIGH LVEF 55% with normal global function. SBP goal 120. Transfuse pltlts. POD4 11/14: Extubated. POD5 11/15: Ana catheter repositioned under ultrasound. Discussed with [facility]. Not lung transplant candidate 10 years ago. Open to eval but unlikely. Possibly discuss with [facility] for transplant if unable to bridge to recovery  POD6 11/16: Increased diureses to Lasix 60 mg IV BID.  Adding Metolazone for

## 2023-11-16 NOTE — PROGRESS NOTES
Referral source:   Janis Morgan at [facility] in [facility] PSY[facility]IATRIC [place] 4 CV INTNSV CARE. I reviewed the medical record prior to this encounter. Spiritual Assessment: Family Care  Encountered [address] Encarn[address] ' parents while rounding on unit. They shared that Naga's siblings would be leaving today and they would remain locally for [facility]. Ms Mya Kline his mom shared they are remaining positive and talked about how [facility] did spoke to them yesterday. However, he still has a ways to go. She and Naga's father remain positive about his Naga's full recovery. No spiritual need noted. Outcome: [address] Max Encarnacion family  verbally expressed appreciation for today's visit and continued support. Plan of Care:  advised [address] Encarnacion of continued spiritual support as needed. The  on-call can be reached at [phone]). [facility], [address] [place] [address] paging Service [phone])

## 2023-11-16 NOTE — PROGRESS NOTES
0800: Bedside and Verbal shift change report given to Altagracia GALLARDO (oncoming nurse) by Savannah Maxwell (offgoing nurse). Report included the following information Nurse Handoff Report. 0800: Dr Yasmeen Aguila and cardiac surgery team at bedside discussing pt's plan of care    0831: AB.45/41.8/66/28.8/93.5%. Updated Dr Bradly Duane - plan for Q4 ABGs. 0845: GAYE REESE at bedside - orders received to remain on CPAP today for pt's respiratory status. 1207: ABG 7.462/43.2/47/30.8/84.5%. Sats on monitor read 88%. Updated Dr Bradly Duane. ECMO specialist increased speeds to 4200, PF 4.5. Sats increased to 91% on monitor. 1300: Repeat ABG 7.489/42.7/48.9/32.5/8.3/86.6%. Updated Dr Tete Edgar at this time from provider. 1434: PTT therapeutic x 2 on Bival dose. 2000: Bedside and Verbal shift change report given to [address] (oncoming nurse) by Aman Lewis (offgoing nurse). Report included the following information Nurse Handoff Report.

## 2023-11-16 NOTE — PROGRESS NOTES
[facility]    ICU TEAM Progress Note    Name: Adam Colon   : 1975   MRN: [id number]   Date: 2023        Reason for ICU Admission: Type A aortic dissection repair, post-op care     HPI: 48M txfr from OSH for emergent Type A aortic dissection repair. Patient arrives to CVICU intubated, sedated for routine post-op care. History from records, providers. Cough, dyspnea for 1-2 wks, p/w sudden band-like chest pain, dyspnea, near syncope. W/u found w/ Type A aortic dissection w/ dilated aortic root with aortic aneurysm extending to the abdomen. Esmolol started. Pt txfr'd to [facility] for emergent surgical repair. Surgery lasted approximately 12 hours, entered OR ~0400, arrived to CVICU ~1600 hrs. OR course complicated by bleeding, rec'd multiple products, VF episode requiring DCCV x 1. Past 24hrs:   Continued anxiety, on dilaudid and precedex gtt, w/ RPNs for breakthrough. Adjusting clonazepam dose and adding seroquel qHS. C/w BiPAP low settings w/ for WOB/ pt comfort. Good UOP w/ diuresis, FWF adjusted for hyperNa, given dose of metolazone. Improvement in air leak to R and mediastinal CT. Hospital course:   - adequate UOP, wakes agitated, +/- redirectable, follows commands.  - CI improved, remains on Epi, Dobuta, Cardene, good UOP.  Desats occaisonally, CXR stable   ECMO cannulation due to hypoxia and L PTX w/ persistent air leak    Extubated  11/15 Cannula adjusted, 1u pRBC    Assessment/Plan:     Type A Aortic dissection extending to abdominal aorta  S/p emergent AVR, Ao Root/aneurysm repair/replace 11/10 Dawit Oglesby  Circ arrest 36 min; Mannitol, 1gm SoluMed  Wakes, nonfocal, agitated, +/- redirectable  OR course c/b:   Episode VF w/ DCCV x 1  Bleeding, Rec'd 3 PRBC, 2 FFP, 1 plt, 1 cryo, TXA, Kcentra, DDAVP, NovoSeven intra-/kellen-op  Goal Platelets > 50  Amio, insulin gtt''s  Goal SBP < 140, MAP > 65  Cadene  Diuresing well: lasix bid  COPD w/ bullous 11/15/23  0406 11/16/23  0423   GLOB 2.8 2.7     Recent Labs     11/16/23  1136 11/16/23  1434   APTT 60.8* 62.8*      Invalid input(s): \"PHI\", \"PCO2I\", \"PO2I\", \"FIO2I\"  No results for input(s): \"CPK\", \"CKMB\" in the last 72 hours. Invalid input(s): \"TROIQ\", \"BNPP\"    Hemodynamics:   PAP:   CO:     [place]: Discharge Planning  Living Arrangements: Family Members (11/14/23 [phone])  Support Systems: Family Members (11/14/23 1115)  Type of Residence: House (11/14/23 1115)  Potential Assistance Needed: N/A (11/14/23 1115)  Patient expects to be discharged to[de-identified] House (11/14/23 1115) CI:     CVP:    SVR:       PVR:       Ventilator Settings:  Mode Rate Tidal Volume Pressure FiO2 PEEP                    Peak airway pressure:      Minute ventilation:          MEDS: Reviewed    Images: Reviewed  CTA Chest 11/10  FINDINGS:  THYROID: Unremarkable. MEDIASTINUM/MESERET: No mass or lymphadenopathy. HEART/PERICARDIUM: Unremarkable. Coronary artery calcification:  1 absent. AORTA:  Aneurysmal dilatation at the root, 5.1 cm, where there is an acute  dissection beginning at the root extending into the ascending aorta, involving  the origin of the innominate artery, as well as into the aortic arch and  descending thoracic aorta to the abdomen, distal extent not included on this  study. The true lumen supplies the innominate artery, the left common carotid  artery, the left subclavian artery which are all patent. The true lumen also  supplies the celiac axis. PULMONARY ARTERIES:No pulmonary embolism. LUNGS/PLEURA: Emphysema and bullous disease. No pulmonary edema or pleural  effusion. INCIDENTALLY IMAGED UPPER ABDOMEN: No significant abnormality. BONES: No destructive bone lesion. ADDITIONAL COMMENTS:  N/A  IMPRESSION:  1. Type A aortic dissection beginning at the dilated aortic root as described. Aneurysm extends to the abdominal aorta, incompletely included on this study. 2. No acute pulmonary embolus.   3. Emphysema and

## 2023-11-17 PROBLEM — Z51.5 PALLIATIVE CARE ENCOUNTER: Status: ACTIVE | Noted: 2023-11-17

## 2023-11-17 NOTE — PROGRESS NOTES
CSS FLOOR Progress Note    Admit Date: 11/10/2023  POD: 4 Days Post-Op      Procedure:    11/13/2023 with Dr. Jaime Dyer, ASHLEIGH BY DR Estevan Gamble    11/10/2023 with Dr. Jerri Valenzuela:  RIGHT AXILLARY CUTDOWN [address]; AVR, ROOT AND ASCENGING AORTIC ANEURYSM REPAIR WITH 29MM KONECT RESILIA  AORTIC VALVED CONDUIT; ECC; CIRCULATORY ARREST; ASHLEIGH & EPIAORTIC US BY DR. Abebe Child & DR. CANTU [facility] Synopsis:  POD0 11/10: Ascending aortic aneurysm repair and AVR with aortic valved conduit. Vfib arrest requiring cardioversion and 20 sec of CPR. Transferred to CVICU on Pneylephrine, Amiodarone, Vasopressin, Levophed, precedex and insulin. LVEF reported as Low normal, with dyskinetic pya-vkfrenqkabpux-hphlwsqyxdwq wall motion in post op ASHLEIGH. Albumenx4, FFP, platelets, 1L LR. Neuro check moved all extremities, nodded appropriately. Wean levo, Vaso and Epi added  POD1 11/11:  Febrile. On Epi. FiO2 60% increased to 80%, ACVC PEEP 10. Nicardipine for SBP ,120. PO amio and lipitor held for elevated liver enzymes. Albuminx2. Tylenol for fever. POD2 11/12: PEEP increased to 12, FiO2 to 70% by intensivist. ET suctioned thick secretions. Pt spontaneously awakening with increasing sedation requirements. Frequent desaturations overnight. Tmax 102. On Epi with plans to transition to dobutamine. POD3 11/13: Increasing ventilation and sedation requirements. FiO2 100%. PEEP of 8 for bollous emphysema. Off Epi. Dobut 2.5 mcg. Brought to OR by Dr. Jerri Valenzuela for placement of VV ECMO. Post op ASHLEIGH LVEF 55% with normal global function. SBP goal 120. Transfuse pltlts. POD4 11/14: Extubated. POD5 11/15: [place] catheter repositioned under ultrasound. Discussed with [facility]. Not lung transplant candidate 10 years ago. Open to eval but unlikely. Possibly discuss with [facility] for transplant if unable to bridge to recovery  POD6 11/16: Increased diureses to Lasix 60 mg IV BID.  Adding Metolazone for

## 2023-11-17 NOTE — PROGRESS NOTES
0800- Bedside report received. Drips verified with offgoing RN. Assessment performed. ECMO: 3800/ 4.04/ 3/100    BiPAP: 11/6, 300, 12, 35    Drips: Angiomax - 0.518mg/kg/hr, Precedex- 1.2 mcg/kg/hr,  Cardene 5mg/hr, Dilaudid 2.4 mg/hr    1020- Verbal order to stop D5 infusion and increase water flushes- 150Q3 via Dr. Karley Morales. 1115- Placed on high flow 50L/ 49%    1330- CT dressing changed again. Danyel Soler PA to bedside. Stich placed around Lpleural site. 1600- Hi-flow decreased to 30/30. Discussed PTT with Kay Marie- feel as though this ptt is falsely low, will repeat in 4 hours. Pre- 7.48, 46.5, 33, 34.4, 65  Post- 7.53, 43.6, 413, 37.1, 100  ABG- 7.54, 44, 63, 38.3, 14.4, 94    1700- Discussed labs with Dr. Karley Morales. Will give 40meq IV potassium     1735- sats 86-88- placed back on BiPAP    2000- Bedside and Verbal shift change report given to [address] RN (oncoming nurse) by Jaynee Primrose RN (offgoing nurse). Report given with SBAR, Kardex, Intake/Output and MAR.

## 2023-11-17 NOTE — PROGRESS NOTES
Occupational Therapy    Chart reviewed, patient remains sedated d/t anxiety when weaning. Will defer at this time and continue to follow up as able.      Carlie Wall MS, OTR/L

## 2023-11-17 NOTE — CARE COORDINATION
RUR:  Readmission? No  1st IM letter given? No  1st  letter given: No    Post-op day 3 for Aortic dissection repair. Remains critically ill. Currently on ECMO. At baseline he was independent. CM received message from patient's dad that he would like to speak with CM. CM will follow up with parents today once they arrive. Noted Palliative consult placed. Of note patient is uninsured. CM will sent consult to [employer] to complete financial screening for Medicaid.

## 2023-11-17 NOTE — DIABETES MGMT
[facility]  DIABETES MANAGEMENT CONSULT    Consulted by Provider for advanced nursing evaluation and care for inpatient blood glucose management. Evaluation and Action Plan   This 46year old AA male was transferred from [facility] 11/9/23 to [facility] when AA noted for emergent intervention. Patient underwent repair of AA 11/10/23. Recovering in ICU. ECMO placement 11/12/23. Had been intubated, sedated and on vent support, along with multiple drugs to support rhythm and BP 11/13/23; now extubated 11/14/23; on BiPap. On Angiomax & Cardene infusions. Sedation+ with Precedex infusion. Hypernatremic today so being diuresed and treated with dextrose infusion. As for BG management, patient did not have diabetes PTA. Insulin needs trended down from a high of 18 units/hr to <3 units/hr at the time of transition off insulin infusion 11/12/23; given 15 units of Lantus insulin. Was on corrective insulin until a small dose of basal insulin was added 11/13/23. Tube feedings have been added, which are running at goal of 60cc/hr. Insulin switched from Lantus to NPH insulin 11/15/23. Off dextrose infusion to correct hypernatremia. Bgs in target but  today. Hence, will lower insulin dose.     Management Rationale Action Plan   Medication   Basal needs Based on  [x]        Blood glucose pattern  []        Weight & BMI  [x]        Kidney dysfunction  []        Home diabetes regimen     Reduce NPH insulin 12 units twice daily   Nutritional needs Based on  []        Blood glucose pattern  []        CHO load  [x]        Enteral feeding CHO load  []        TPN/PPN dextrose load     TF running at goal rate of 60cc/hr  (269 grams of CHO/D)    Corrective insulin Based on sensitivity  [x]        Low   []        Medium  []        High      Additional orders   BG monitoring Q6 hrs          Initial Presentation   Sulma Zhang is a 46 y.o. male transferred 11/9/23 from [facility] ER after experiencing sudden onset of

## 2023-11-17 NOTE — PROGRESS NOTES
NUTRITION brief    Recommendations:     Continue TF via DHT of Vital 1.5 @ 60 mL/hr  Give 1 packet Prosource daily    Flushes of 150 mL H2O q 3 hours - adjustments per MD/ nephrology    Recommend increasing bowel regimen to promote BM       Diet: NPO  Nutrition Support: TF via DHT of Vital 1.5 @ 60 mL/hr with 1 packet Prosource daily and flushes of 150 mL H2O q 3 hours   Nutrition-related meds: lasix, protonix, zosyn, glycolax, effer-K, senokot, vancomycin, bival, precedex, dilaudid gtt, cardene gtt, PRN: dilaudid, versed, oxycodone  Last BM:  (PTA)      New events impacting nutrition plan of care: pt extubated 11/14. Encephalopathic. Remains on continuous BiPAP with DHT for nutrition. Tolerating tube feeds at goal, but worsening hypernatremia. Nephrology following. Flushes increased from 150 mL H2O q 4 hours to q 3 hours today. Still without BM since PTA. On bowel regimen, but may need to be increased. Tube feeds as above provides 1320 mL, 2060 kcal, 109 gm pro, 247 gm CHO, and 1000+60+6714=1722 mL free H2O to meet 100% of kcal and 95% of pro needs respectively. See full RD assessment from 11/14 for additional details, goals, and monitoring/evaluation.    Estimated Nutrition Needs:   Energy Requirements Based On: Kcal/kg  Weight Used for Energy Requirements: Usual (77.1 kg)  Energy (kcal/day): 6774-5808 (25-30 kcal/kg)  Weight Used for Protein Requirements: Usual  Protein (g/day): 115 (1.5 gm/kg)     Fluid (ml/day): 1 mL/kcal    Recent Labs     11/15/23  1603 11/15/23  1614 11/16/23  0423 11/16/23  1255 11/17/23  0201   GLUCOSE 184*  --  170* 164* 88   BUN 49*  --  53* 53* 48*   CREATININE 1.36*  --  1.38* 1.45* 1.44*   *  --  157* 157* 158*   K 3.4*  --  4.3 4.4 4.2   *  --  125* 124* 122*   CO2 29  --  30 30 33*   CALCIUM 7.8*  --  7.9* 8.4* 8.3*   MG  --  2.8* 2.8* 2.5* 2.4       Recent Labs     11/14/23  1801 11/14/23  2031 11/15/23  0008 11/15/23  0404 11/15/23  0831 11/15/23  1201
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
## 2023-11-17 NOTE — PLAN OF CARE
2000 Bedside shift change report given to [address] (oncoming nurse) by Ran Giron (offgoing nurse). Report included the following information Nurse Handoff Report. Assessment completed and ECMO settings noted. ECMO Specialist, Marcelo Zacarias, at bedside. 2030 ABG 7.39/45.3/67/34.6/94%. 2100 Sweep decreased to 2.5 by ECMO specialist.     2230 ABG 7.5/49.2/74/38.5/95%. ECMO specialist decreased sweep to 2.    2310 RN turned patient- SBP increased into 170s- IV hydralazine given. RR increased and patient grimacing- PRN IV dilaudid given. 2320 SBP remains elevated into 200s- PRN Versed given and cardene gtt initiated at 5mg. Dr Reginald Jimenez at bedside- orders given for an additional 1mg IV dilaudid and 1 mg IV versed- see MAR.    0730 Bedside shift change report given to [address] (oncoming nurse) by Remberto Springer RN (offgoing nurse). Report included the following information Nurse Handoff Report.   _______________________________________  Problem: Pain  Goal: Verbalizes/displays adequate comfort level or baseline comfort level  Outcome: Progressing     Problem: Discharge Planning  Goal: Discharge to home or other facility with appropriate resources  Outcome: Progressing     Problem: Safety - Medical Restraint  Goal: Remains free of injury from restraints (Restraint for Interference with Medical Device)  Description: INTERVENTIONS:  1. Determine that other, less restrictive measures have been tried or would not be effective before applying the restraint  2. Evaluate the patient's condition at the time of restraint application  3. Inform patient/family regarding the reason for restraint  4.  Q2H: Monitor safety, psychosocial status, comfort, nutrition and hydration  Outcome: Progressing     Problem: Safety - Adult  Goal: Free from fall injury  Outcome: Progressing     Problem: ABCDS Injury Assessment  Goal: Absence of physical injury  Outcome: Progressing     Problem: Nutrition Deficit:  Goal: Optimize nutritional status  Outcome:

## 2023-11-18 NOTE — PROGRESS NOTES
0800 Assessment done afer care assumed. Dr. Claude Stanford rounding updates given and orders received. 0242 Dilaudid drip off pt beging allowed to waken some. 0930: Parents in to visit  update given. Pt is coughing up lots of thick secretions. Some old blood noted als. Great cough effort but nurse needs to use yankuer to help get the sputum all the way out of his longs. Precedex titrated to 1 mcg and does help pt stay calmer. ECMO specialist titrating sweep as needed to help pt. He is air hungry and is having anxiety as he wakens. Pt talking more and more. He is begging for water and ice but he coughs with every ice chip. Swabs on the tongue and lips do not cause excessive coughing. He wants to go home he wants to see his dog then the next breath he says let me die. 1522: Pt given PO dilaudid down his DOBHOFF but anxiety is escalating. Intensivest called to see pt. Pt having liquid dark brown stool. 1627 and 100 pt given 0.5 mg Dilaudid Iv times 2    1800: Finally pt back on BIPAP and resting some. 1900 Pt cleaned up from another liquid stool and he is very wild and trying to throw his legs up and sit up. Given Versed 1 mg IV.    1930: Bedside and Verbal shift change report given to [address] (oncoming nurse) by Mike See RN  (offgoing nurse). Report included the following information Adult Overview, Intake/Output, MAR, Recent Results, Med Rec Status, and Cardiac Rhythm NSR  .

## 2023-11-18 NOTE — PROGRESS NOTES
ECG of 09-NOV-2023 23:36,  Atrial fibrillation has replaced Sinus rhythm  Vent. rate has increased BY  42 BPM  QRS duration has decreased  Minimal criteria for Inferior infarct are no longer present  T wave inversion no longer evident in Inferior leads  Confirmed by Narciso Lagunas M.D., Bharati Deshpande ([id number]) on 11/18/2023 10:51:32 AM           CXR:   XR CHEST PORTABLE 11/18/2023    Narrative  EXAM:  XR CHEST PORTABLE at 0415 hours    INDICATION: Open heart surgery    COMPARISON: 11/17/2023    TECHNIQUE: portable chest AP view    FINDINGS: The heart size is normal. Dobbhoff is in place. A large bore Ecmo  catheter projects over the right medial hemithorax. Bilateral pneumothoraces are  seen with chest tubes in place and there is a small amount of pneumomediastinum. The right lower lobe is opacified. Subsegmental atelectasis is present at the  left base. Impression  Increased size of the bilateral pneumothoraces with chest tubes in place    This report was verbally relayed by me at 1039 hours to Vel Prajapati, [address] [place] [address]      Admission Weight: Last Weight   Weight - Scale: 88.3 kg (194 lb 9.6 oz) Weight - Scale: 86.2 kg (190 lb 1.6 oz)     EXAM:     General:  Waxes and wanes between drowsy and agitation depending on whether he has received sedation    Lungs:    rhonchi anterior - bilateral, apex - bilateral, base - right, bilaterally, and posterior - bilateral   Incision:  Incision clean, dry and intact   Heart:   Regular rate and rhythm and no murmurs, rubs or gallops   Abdomen:    distended, hypoactive bowel sounds, and no masses, no organomegaly No BM postoperatively. Receiving TF through dobhoff.    Extremities:  2+ pitting edema   Neurologic:  Follows commands, moves fingers bilaterally equally, very weak       Lab Data Reviewed:   Recent Labs     11/18/23 0418 11/18/23 0420   WBC 12.2*  --    HGB 8.2*  --    HCT 26.6*  --    *  --    * 154*   K 3.9 3.9   BUN 43* 43*         Assessment:     Patient Active Problem we could try inova. Will discuss with Dr. Florina Murguia and Dr. Frederick Alvarez.  - Per [facility] - formal lung transplant evaluation - currently on hold. Hypernatremia:  - Sodium improved some at 154 this morning. Nephrology following - appreciate their assistance. - Has received diuril and Metolazone to help  - free water with tube feeds     Acute Right Ventricular Failure: RV function decreased on intra-op ASHLEIGH during VV ECMO. - improved/stable - monitor LFTs, CVP, periodic echos    Acute Post operative Transaminitis: secondary to acute RV failure  - d/c amio gtt, transition to oral only  - tylenol 2 gm 24 hour max    Agitation/anxiety:  - cont dex   - Continue klonopin tp q8h    Thrombocytopenia; Above transfusion goal. Likely d/t consumption between emergent surgery and ECMO cannulation  - transfuse trigger 30k  - transitioned to bival, now off heparin  - hold asa  - plts up-trending    Acute postoperative blood loss anemia: Expected, normocytic. Above transfusion threshold. - Monitor CT output and cbc   - Hgb stable at 8.5 this morning    Leukocytosis: Likely inflammatory, potentially infectious  - WBC decreased to 12.2k today down from 15.4 yesterday morning.   - NGTD on blood or sputum  - cont empiric coverage   Zosyn (11/12 -   )   Vancy (11/12 -   )  - Pulm toilet: acapella, IS, cough, deep breathe, ambulate   - lactic and procal trending down    Acute Kidney injury: No known pre-operative kidney disease. Pre-op Cr 1.2  - Nephrology following - held diuretics today  - monitor Is/Os, CMP    Hypokalemia:  - Continue scheduled effer-K with addition to repletion per protocol  - K 4.1 today. Hx of Graves' Disease; No recent thyroid markers since 2019. No PTA. 11/10 CTA chest showed thyroid as unremarkable. Bipolar 1 Disorder with Hx of Drug abuse, PTSD  - Increase klonopin to q8h    Constipation: Did have a small BM 11/17. Will give a dose of MOM today.  Continue stool softener, Miralax, PRN dulcolax

## 2023-11-18 NOTE — PROCEDURES
ECMO Management Note Day #5    Cannula: 31 Fr Dual lumen Bi-[place]aval [place] cannula in RIJ (3cm insertion to end of silver rings), Placed by Dr. Abelino Gabriel    Mode: V-V    Sweep: 1.5-6L per pt factors, FdO2 100%    Flow: 4.1 L @ 3900 RPM's    Oxygenator pressure delta: around 70    Anticoagulation: Bival   - PTT: 56 (goal 55-75)    Circuit Inspection: Circuit inspected  w/o visible clot or air in circuit, connections zip tied, pump-head well seated. Euroset oxygenator some clot in de-airing line, otherwise free of visible clot or air, attached to heater/cooler. ECMO Run Significant Events:  - 11/13: Pt cannulated for V-V ECMO  - 11/14 Extubated  - 11/15 Cannula placement adjusted under POCUS, pulled back 2cm 2/2 recirculation and return jet in IVC. Past 24hrs  - No acute events o/n, goal to continue to wake pt as tolerated and begin work w/ PT if possible over weekend or early next week    Assessment/Plan:  - Pt cannulated due to hypoxemia and bilateral PTX w/ marked air leak for full lung rest   - Continue current support  - Adjust Sweep to minimize WOB/air hunger  - Discussed w/ RN and perfusionist at bedside    - See full daily note for further plan    Anibal A. Bradly Duane, MD  Staff [address] [employer]

## 2023-11-18 NOTE — PROGRESS NOTES
dose medications  RFP  Mg/K/Phos->2/3/4    ENDOCRINE:   Goal euglycemia  Unclear if pt remains on methimazole for graves    HEMATOLOGY/ONCOLOGY:   Bival for [facility]  CBC    INFECTIOUS DISEASE:   Cx NGTD  C/w Vanc/zosyn for empiric PNA coverage    ICU DAILY CHECKLIST   Code Status:Full  DVT Prophylaxis:bival  T/L/D: CVC, Cassandra, ECMO cannula  SUP: PPI  Diet: TF  Activity Level:ad  donnie  ABCDEF Bundle/Checklist Completed:Yes  Disposition: ICU  Multidisciplinary Rounds Completed: yes  Goals of Care Discussion/Palliative: yes  Patient/Family Updated: yes      [address]   11/10 admitted to CVICU post emergent aortic repair and AVR  11/11 - adequate UOP, wakes agitated, +/- redirectable, follows commands. 11/12 - CI improved, remains on Epi, Dobuta, Cardene, good UOP. Desats occaisonally, CXR stable  11/13 ECMO cannulation due to hypoxia and L PTX w/ persistent air leak   11/14 Extubated  11/15 Cannula adjusted, 1u pRBC    SUBJECTIVE   Review of Systems   Unable to obtain due to pt factors    OBJECTIVE   Physical Exam  Vitals and nursing note reviewed. Constitutional:       Appearance: He is ill-appearing. Comments: Somnolent, rouses to speech   HENT:      Head: Normocephalic and atraumatic. Nose: Nose normal. No congestion. Mouth/Throat:      Mouth: Mucous membranes are moist.      Pharynx: Oropharynx is clear. No oropharyngeal exudate. Eyes:      General: No scleral icterus. Extraocular Movements: Extraocular movements intact. Conjunctiva/sclera: Conjunctivae normal.      Pupils: Pupils are equal, round, and reactive to light. Neck:      Comments: RIJ dual lumen EMCO cannula  Cardiovascular:      Rate and Rhythm: Normal rate and regular rhythm. Pulses: Normal pulses. Heart sounds: Normal heart sounds. Comments: Mediastinal drains x2  Pulmonary:      Comments: Bilateral chest tubes, intermittent tachypnea.  Occasional small expiratory air leak from R chest

## 2023-11-18 NOTE — PROGRESS NOTES
Nephrology Progress Note  [address] [facility]  [address] [facility], Syed Juarez  [place], [facility] [address]  Phone - [phone]  Fax - [phone]                 Patient: Nakia Smith                     YOB: 1975        Date- 11/18/2023                                     Admit Date: 11/10/2023   CC: Follow up for kendall, hypernatremia          IMPRESSION & PLAN:   Kendall- ATN  Hypernatremia  Afib  Hypoxia   Type A aortic dissection s/p repair and AVR on 11/10  Resp failure  Hx of Graves'  Bipolar d/o  Hx of drug abse  PTSD    PLAN-  No more lasix for now  No more diamox  Check bmp this pm  D/w nurse     Subjective: Interval History:   -  he had 13 liter urine out put in last 2 days  S/p lasix and diuril  On vent    Objective:   Vitals:    11/18/23 1205 11/18/23 1230 11/18/23 1300 11/18/23 1330   BP:       Pulse: 89 84 (!) 101 (!) 101   Resp: 30 26 24 (!) 43   Temp:       TempSrc:       SpO2:       Weight:       Height:          I/O last 3 completed shifts: In: 8653.2 [I.V.:2417. 2; NG/GT:4020; IV Piggyback:2216]  Out: 63135 [KVYLU:45655; Chest Tube:770]  I/O this shift: In: 848.3 [I.V.:184.8; NG/GT:220; IV Piggyback:443.5]  Out: 2060 [Urine:2050; Chest Tube:10]      Physical exam:    GEN: intubated on vent  NECK-no mass, ET TUBE  RESP: no wheezing  ABDO: soft , non tender,  EXT: ++ Edema   NEURO:Can't access due to patient's current condition    : Wilson +        Chart reviewed. Pertinent Notes reviewed.      Data Review :  Lab Results   Component Value Date/Time     11/18/2023 11:38 AM    K 4.1 11/18/2023 11:38 AM     11/18/2023 11:38 AM    CO2 33 11/18/2023 11:38 AM    BUN 43 11/18/2023 11:38 AM    CREATININE 1.46 11/18/2023 11:38 AM    GLUCOSE 138 11/18/2023 11:38 AM    CALCIUM 9.3 11/18/2023 11:38 AM       Lab Results   Component Value Date    WBC 12.2 (H) 11/18/2023    HGB 8.2 (L) 11/18/2023    HCT 26.6 (L) 11/18/2023    MCV BID    chlorhexidine (PERIDEX) 0.12 % solution 15 mL  15 mL Mouth/Throat BID    hydrALAZINE (APRESOLINE) injection 10 mg  10 mg IntraVENous Q6H PRN    midazolam PF (VERSED) injection 1 mg  1 mg IntraVENous Q1H PRN    diphenhydrAMINE (BENADRYL) capsule 25 mg  25 mg Oral Nightly PRN    polyethylene glycol (GLYCOLAX) packet 17 g  17 g Oral Daily    sennosides-docusate sodium (SENOKOT-S) 8.6-50 MG tablet 1 tablet  1 tablet Oral BID    bisacodyl (DULCOLAX) suppository 10 mg  10 mg Rectal Daily PRN    [Held by provider] atorvastatin (LIPITOR) tablet 40 mg  40 mg Oral Nightly    potassium chloride 20 mEq/50 mL IVPB (Central Line)  20 mEq IntraVENous PRN    magnesium sulfate 2000 mg in 50 mL IVPB premix  2,000 mg IntraVENous PRN    albuterol (PROVENTIL) (2.5 MG/3ML) 0.083% nebulizer solution 2.5 mg  2.5 mg Nebulization Q4H PRN    melatonin tablet 6 mg  6 mg Oral Nightly PRN    dexmedetomidine (PRECEDEX) 400 mcg in sodium chloride 0.9 % 100 mL infusion  0.1-1.5 mcg/kg/hr IntraVENous Continuous    albumin human 5% IV solution 12.5 g  12.5 g IntraVENous PRN        Klaus Mcdaniel MD  11/18/2023

## 2023-11-18 NOTE — PLAN OF CARE
Problem: Physical Therapy - Adult  Goal: By Discharge: Performs mobility at highest level of function for planned discharge setting. See evaluation for individualized goals. Description: FUNCTIONAL STATUS PRIOR TO ADMISSION: Patient was independent and active without use of DME. Involved in NA. No falls. Works as a  per palliative note. HOME SUPPORT PRIOR TO ADMISSION: Unsure if patient lives alone or with gf/family. Unable to provide history at [facility].     Physical Therapy Goals  Initiated 11/18/2023  1. Patient will move from supine to sit and sit to supine, scoot up and down, and roll side to side in bed with maximal assistance within 5 day(s). 2.  Patient will sit EOB x5 min with moderate assist x2 within 5 days. 3.  Patient will tolerate bed in chair position 30 min TID within 5 days. 4.  Patient will actively participate in supine/bed in chair position exercises 75% of the time within 5 days. 5.  Patient will verbally recall and functionally demonstrate mindful-based movements (\"move in the tube\") principles with min cues within 5 days. Outcome: Progressing   PHYSICAL THERAPY EVALUATION    Patient: Milton Joy (35 y.o. male)  Date: 11/18/2023  Primary Diagnosis: Aortic arch dissection ([address]) [I71.011]  Aortic dissection (H[facility]) [I71.00]  Procedure(s) (LRB):  VENOVENOUS EXTRA CORPOREAL MEMBRANE OXYGENATION, ASHLEIGH BY DR HUYNH (Right) 5 Days Post-Op   Precautions: Cardiac, Surgical Protocols (move in the tube, VV ECMO) SBP < 150      ASSESSMENT :   DEFICITS/IMPAIRMENTS:   The patient is limited by decreased functional mobility, ROM, strength, activity tolerance, endurance, cognition, command following, attention/concentration, coordination, balance, increased pain levels following admission for emergent aortic dissection repair (POD 8) complicated by respiratory failure necessitating VV ECMO cannulation (POD 5).  Precedex weaned by RN at beginning of session and patient able to follow for pressure relief    COMMUNICATION/EDUCATION:   The patient's plan of care was discussed with: occupational therapist and registered nurse         Thank you for this referral.  Shauna Chavez, PT, DPT  Minutes: 16      Physical Therapy Evaluation Charge Determination   History Examination Presentation Decision-Making   HIGH Complexity :3+ comorbidities / personal factors will impact the outcome/ POC  HIGH Complexity : 4+ Standardized tests and measures addressing body structure, function, activity limitation and / or participation in recreation  LOW Complexity : Stable, uncomplicated  AM-PAC  HIGH    Based on the above components, the patient evaluation is determined to be of the following complexity level: Low

## 2023-11-18 NOTE — PROGRESS NOTES
Bedside and Verbal shift change report given to [address] (oncoming nurse) by Nola Youngblood (offgoing nurse). Report included the following information Nurse Handoff Report and Recent Results. ECMO Settings: 3900 RPM/4.13 flow/100% FdO2/6 sweep    2030 Pt's BP gradually rising, Cardene gtt restarted and quickly titrated to max, 15 mg/hr. 1 mg dilaudid given, dilaudid gtt increased. 2050 Dr. Melonie Patterson on unit. Pt still hypertensive, discussed PRNs, 10 mg IV hydralazine given. 2110 Pt tachycardic, HR >110, appears irregular. Pt very tachypneic, becoming more agitated. 1 mg versed given, Precedex gtt increased to 1.5    2130 12 lead EKG obtained for ongoing tachycardia, rates into 130s. EKG shows Afib with RVR and right BBB. Dr. Melonie Patterson notified, orders to give 150 mg amio bolus. 2205 ABG: pH 7.6, CO2 36.5, pO2 58, Bicarb 35.9, SO2 93.8%  Sweep 6--> 5, Dr. Melonie Patterson notified, no additional changes at this time. 2300 Sweep 5--> 4    0000 ABG pH 7.58, CO2 37.1, pO2 75, Bicarb 34.7, SO2 96.8%  Sweep 4--> 3    0243 MAP drifting down, 60-62,  CVP  5-6. Dr. Melonie Patterson notified,  orders  to give 250 ml albumin.    0326 MAP still low, 56-61,  Dr. Melonie Patterson notified. Order to give additional 250 ml  albumin. 0415 ABG: pH 7.56, CO2 40.5, pO2 68, Bicarb 35.9, SO2 95.4%    0500 Sweep 3--> 2    Bedside and Verbal shift change report given to Suzette Nunez (oncoming nurse) by Lan Khan (offgoing nurse). Report included the following information Nurse Handoff Report, Adult Overview, and Recent Results.

## 2023-11-19 NOTE — PROGRESS NOTES
CRITICAL CARE NOTE    Name: Hyacinth Andrade   : 1975   MRN: [id number]   Date: 2023      REASON FOR ICU ADMISSION:  Type A aortic dissection     PRINCIPAL ICU DIAGNOSIS   Type A aortic dissection s/p repair and AVR  Acute hypoxic respiratory failure, VV- ECMO  R pneumothorax w/ persistent airleak  COPD, bullous emphysema  TEOFILO  HTN  KARINE  Hypernatremia  Afib, post procedure  Hx graves  Bipolar d/o  PTSD  Hx distant PSUD, current smoking    BRIEF PATIENT SUMMARY   48M txfr from OSH for emergent Type A aortic dissection repair and AVR. Patient arrives to CVICU intubated, sedated for routine post-op care. Surgery lasted approximately 12 hours, entered OR ~0400, arrived to CVICU ~1600 hrs. OR course complicated by bleeding, rec'd multiple products, VF episode requiring DCCV x 1. Underwent ECMO cannulation due to hypoxia and L PTX w/ persistent air leak in AM . Weaned off inotropic support. Extubated . COMPREHENSIVE ASSESSMENT & PLAN:SYSTEM BASED     24 HOUR EVENTS:  No acute events, lessened agitation overnight.     NEUROLOGICAL:   Wean dilaudid and precedex to goal RASS 0 to -1  Goal to transition to PO anxiolysis and pain regimen  C/w clonazepam, qhs seroquel  Monitor mentation  Unclear if pt on PO regime for anxiety, PTSD, bipolar prior to admission    PULMONOLOGY:   Oxygenation, ventilation per ECMO circuit, requirements increased with agitation, though patient able to be redirected and respiratory status improves, thus circuit support can be diminished in such instances  HFNC and BiPAP for pt comfort, minimize PEEP and TV  Monitor Chest tube air leak  Diuresis to slight negative  Scheduled nebs  Empiric PNA coverage as bellow  Pulmonary hygiene  CT imaging follow up to further evaluate as indicated    CARDIOVASCULAR:   Stable on V-V ECMO via R IJ cannulation, continue to wean respiratory support as tolerated  PRN cardene for SBP <150  PO amiodarone    GASTROINTESTINAL   TF, FWF  PPI  Bowel regimen     RENAL/ELECTROLYTE/FLUIDS:   Strict I/Os  Monitor Na, adjusted FWF, diuril  Dose of diamox in AM for alkalosis   Renally dose medications  RFP  Mg/K/Phos->2/3/4    ENDOCRINE:   Goal euglycemia  Unclear if pt remains on methimazole for Graves' disease    HEMATOLOGY/ONCOLOGY:   Bival for [facility]  CBC    INFECTIOUS DISEASE:   Cx NGTD  C/w Vanc/zosyn for empiric PNA coverage    ICU DAILY CHECKLIST   Code Status:Full  DVT Prophylaxis:bival  T/L/D: CVC, Cassandra, ECMO cannula  SUP: PPI  Diet: TF  Activity Level:ad  donnie  ABCDEF Bundle/Checklist Completed:Yes  Disposition: ICU  Multidisciplinary Rounds Completed: yes  Goals of Care Discussion/Palliative: yes  Patient/Family Updated: yes      [address]   11/10 admitted to CVICU post emergent aortic repair and AVR  11/11 - adequate UOP, wakes agitated, +/- redirectable, follows commands. 11/12 - CI improved, remains on Epi, Dobuta, Cardene, good UOP. Desats occaisonally, CXR stable  11/13 ECMO cannulation due to hypoxia and L PTX w/ persistent air leak   11/14 Extubated  11/15 Cannula adjusted, 1u pRBC    SUBJECTIVE   Review of Systems   Unable to obtain due to pt factors    OBJECTIVE   Physical Exam  Vitals and nursing note reviewed. Constitutional:       Appearance: He is ill-appearing. Comments: Somnolent, rouses to speech   HENT:      Head: Normocephalic and atraumatic. Nose: Nose normal. No congestion. Mouth/Throat:      Mouth: Mucous membranes are moist.      Pharynx: Oropharynx is clear. No oropharyngeal exudate. Eyes:      General: No scleral icterus. Extraocular Movements: Extraocular movements intact. Conjunctiva/sclera: Conjunctivae normal.      Pupils: Pupils are equal, round, and reactive to light. Neck:      Comments: RIJ dual lumen EMCO cannula  Cardiovascular:      Rate and Rhythm: Normal rate and regular rhythm. Pulses: Normal pulses. Heart sounds: Normal heart sounds.

## 2023-11-19 NOTE — PROGRESS NOTES
Physical Therapy 11/19/2023    Chart reviewed up to date. Attempted PT session. RN requesting to defer therapy due to patient's respiratory status. Will continue to follow. Thank you.   Arash Calvert, PT, DPT

## 2023-11-19 NOTE — PROGRESS NOTES
ECMO Management Note (CPT 48231)    Blood flow:  4 lpm    MAP:  72    ABG:  alkalotic - see latest in epic    Anticoagulation: bivalrudin    ACT:    Cannula site (extremity):  ezra site clean, dry    Other:  cxr improving; productive cough; weaning off dilaudid infusion today

## 2023-11-19 NOTE — PROGRESS NOTES
11/19/2023    HGB 8.0 (L) 11/19/2023    HCT 26.0 (L) 11/19/2023    MCV 93.5 11/19/2023     (L) 11/19/2023      Recent Labs     11/18/23  1601 11/18/23  2224 11/19/23  0404   * 151* 151*   K 3.5 4.5 3.7   * 117* 117*   CO2 32 31 29   GLUCOSE 183* 160* 197*   BUN 47* 46* 42*   CREATININE 1.52* 1.38* 1.37*   CALCIUM 9.4 9.0 8.8         Recent Labs     11/17/23  0201 11/18/23  0418 11/19/23  0404   WBC 14.4* 12.2* 16.3*   RBC 2.95* 2.84* 2.78*   HGB 8.5* 8.2* 8.0*   HCT 27.3* 26.6* 26.0*   MCV 92.5 93.7 93.5   MCH 28.8 28.9 28.8   MCHC 31.1 30.8 30.8   RDW 17.0* 17.2* 17.4*   * 110* 133*   MPV 11.3 11.9 12.0       No results found for: \"IRON\", \"TIBC\", \"LABIRON\"  No results found for: \"PTH\"  Lab Results   Component Value Date/Time    LABA1C 5.1 11/10/2023 03:58 AM     Lab Results   Component Value Date/Time    COLORU YELLOW/STRAW 11/12/2023 12:23 PM    CLARITYU CLEAR 09/09/2019 12:00 PM    GLUCOSEU Negative 11/12/2023 12:23 PM    BILIRUBINUR Negative 11/12/2023 12:23 PM    KETUA Negative 11/12/2023 12:23 PM    SPECGRAV 1.010 11/12/2023 12:23 PM    BLOODU MODERATE (A) 11/12/2023 12:23 PM    PHUR 6.0 09/09/2019 12:00 PM    PROTEINU Negative 11/12/2023 12:23 PM    NITRU Negative 11/12/2023 12:23 PM    LEUKOCYTESUR MODERATE (A) 11/12/2023 12:23 PM     US Results (most recent):  Medication list  reviewed  Current Facility-Administered Medications   Medication Dose Route Frequency    QUEtiapine (SEROQUEL) tablet 50 mg  50 mg Oral Once    magnesium hydroxide (MILK OF MAGNESIA) 400 MG/5ML suspension 30 mL  30 mL Oral Daily PRN    HYDROmorphone (DILAUDID) tablet 1 mg  1 mg Per G Tube Q4H PRN    HYDROmorphone (DILAUDID) tablet 2 mg  2 mg Per G Tube Q4H PRN    niCARdipine (CARDENE) 50 mg in sodium chloride 0.9 % 100 mL infusion  0-15 mg/hr IntraVENous Continuous    vancomycin (VANCOCIN) 750 mg in sodium chloride 0.9 % 250 mL IVPB (Tfju7Mvt)  750 mg IntraVENous Q12H    insulin NPH (HumuLIN N;NovoLIN N) injection vial 12 Units  12 Units SubCUTAneous Q12H    clonazePAM (KLONOPIN) tablet 1 mg  1 mg Oral Q8H    QUEtiapine (SEROQUEL) tablet 50 mg  50 mg Oral Nightly    HYDROmorphone HCl PF (DILAUDID) injection 1 mg  1 mg IntraVENous Q4H PRN    insulin lispro (HUMALOG) injection vial 0-4 Units  0-4 Units SubCUTAneous Q6H    acetaminophen (TYLENOL) tablet 975 mg  975 mg Oral Q12H PRN    potassium bicarb-citric acid (EFFER-K) effervescent tablet 40 mEq  40 mEq Oral BID    pantoprazole (PROTONIX) 40 mg in sodium chloride (PF) 0.9 % 10 mL injection  40 mg IntraVENous Daily    balsum peru-castor oil (VENELEX) ointment   Topical BID    bivalirudin trifluoroacetate (ANGIOMAX) 250 mg in sodium chloride 0.9 % 50 mL IVPB (mini-bag)  0.02-2.5 mg/kg/hr IntraVENous Continuous    ipratropium 0.5 mg-albuterol 2.5 mg (DUONEB) nebulizer solution 1 Dose  1 Dose Inhalation 4x Daily RT    budesonide (PULMICORT) nebulizer suspension 500 mcg  0.5 mg Nebulization BID RT    piperacillin-tazobactam (ZOSYN) 3,375 mg in sodium chloride 0.9 % 50 mL IVPB (mini-bag)  3,375 mg IntraVENous Q8H    Vancomycin - Dosing by Pharmacy  1 each Other RX Placeholder    glucose chewable tablet 16 g  4 tablet Oral PRN    dextrose bolus 10% 125 mL  125 mL IntraVENous PRN    Or    dextrose bolus 10% 250 mL  250 mL IntraVENous PRN    glucagon injection 1 mg  1 mg SubCUTAneous PRN    dextrose 10 % infusion   IntraVENous Continuous PRN    0.9 % sodium chloride infusion   IntraVENous Continuous    0.45 % sodium chloride infusion   IntraVENous Continuous    sodium chloride flush 0.9 % injection 5-40 mL  5-40 mL IntraVENous 2 times per day    sodium chloride flush 0.9 % injection 5-40 mL  5-40 mL IntraVENous PRN    ondansetron (ZOFRAN) injection 4 mg  4 mg IntraVENous Q4H PRN    [Held by provider] aspirin EC tablet 81 mg  81 mg Oral Daily    lidocaine 4 % external patch 2 patch  2 patch Topical Daily    amiodarone (CORDARONE) tablet 400 mg  400 mg Oral BID

## 2023-11-19 NOTE — PROGRESS NOTES
ECMO Management Note (CPT 41339)    Blood flow:  4 lpm    MAP:  70    ABG:  alkalotic - see latest in epic    Anticoagulation: bivalrudin    ACT:    Cannula site (extremity):  ezra site clean, dry    Other:  cxr continues to show improved aeration - 4 liters negative last 24, still cum 2L+; placed stitch for oozing left ct site; persistent small bpf with leak in right tube and small right ptx on cxr

## 2023-11-19 NOTE — PROGRESS NOTES
[facility] FLOOR Progress Note    Admit Date: 11/10/2023      Procedure:    11/13/2023 with Dr. Anamika Leiva, ASHLEIGH BY DR Cata Douglass    11/10/2023 with Dr. Maggie Narayanan:  RIGHT AXILLARY CUTDOWN [address]; AVR, ROOT AND ASCENGING AORTIC ANEURYSM REPAIR WITH 29MM KONECT RESILIA  AORTIC VALVED CONDUIT; ECC; CIRCULATORY ARREST; ASHLEIGH & EPIAORTIC US BY DR. Loy Harmon & DR. CANTU [facility] Synopsis:  POD0 11/10: Ascending aortic aneurysm repair and AVR with aortic valved conduit. Vfib arrest requiring cardioversion and 20 sec of CPR. Transferred to CVICU on Pneylephrine, Amiodarone, Vasopressin, Levophed, precedex and insulin. LVEF reported as Low normal, with dyskinetic jpa-mddmgukrftcrn-cvquyfhwzlex wall motion in post op ASHLEIGH. Albumenx4, FFP, platelets, 1L LR. Neuro check moved all extremities, nodded appropriately. Wean levo, Vaso and Epi added  POD1 11/11:  Febrile. On Epi. FiO2 60% increased to 80%, ACVC PEEP 10. Nicardipine for SBP ,120. PO amio and lipitor held for elevated liver enzymes. Albuminx2. Tylenol for fever. POD2 11/12: PEEP increased to 12, FiO2 to 70% by intensivist. ET suctioned thick secretions. Pt spontaneously awakening with increasing sedation requirements. Frequent desaturations overnight. Tmax 102. On Epi with plans to transition to dobutamine. POD3 11/13: Increasing ventilation and sedation requirements. FiO2 100%. PEEP of 8 for bollous emphysema. Off Epi. Dobut 2.5 mcg. Brought to OR by Dr. Maggie Narayanan for placement of VV ECMO. Post op ASHLEIGH LVEF 55% with normal global function. SBP goal 120. Transfuse pltlts. POD4 11/14: Extubated. POD5 11/15: [place] catheter repositioned under ultrasound. Discussed with [facility]. Not lung transplant candidate 10 years ago. Open to eval but unlikely. Possibly discuss with [facility] for transplant if unable to bridge to recovery  POD6 11/16: Increased diureses to Lasix 60 mg IV BID. Adding Metolazone for diurese/hypernatremia.  On D50 38.5 dobhoff with   Q3  GI proph: protonix  Bowel reg: senokot, glycoloax, ++ BM DVT proph: bival, SCDs    Dispo: Remain in ICU. Palliative following. Dr. Keith Olivares put a stitch in the bleeding CT site today. CXR looks marginally better on the right.      Signed By: RANJITH Hinkle - NP

## 2023-11-19 NOTE — PROGRESS NOTES
Bedside and Verbal shift change report given to [address] (oncoming nurse) by Reina Ly (offgoing nurse). Report included the following information Nurse Handoff Report and Recent Results. ECMO: 4.67/4300/6/100%    Shift Summary:   Compared to previous night, Pt significantly more irritable and agitated. Can occasionally be reassured or redirected, but eventually behavior escalates to attempting to sit up, moving arms and legs excessively, and not following instructions. Pt repeatedly requesting water, frequent oral care performed, swabs offered, but thirst was a common trigger for agitation. As Pt becomes more agitated, respiratory rate reaches high 40s-50, SBP rises as high as 160s. Twice, Pt was briefly in Afib with RVR with rates 120s-130, but self-converted to sinus rhythm once he relaxed. Three large, loose bowel movements. ECMO: 4.68/4300/3.5/100%    Bedside and Verbal shift change report given to [place] (oncoming nurse) by Lan Khan (offgoing nurse). Report included the following information Nurse Handoff Report, Adult Overview, Recent Results, and Cardiac Rhythm SR w/BBB .
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
## 2023-11-19 NOTE — PROGRESS NOTES
0800 Bedside shift change report given to [address] (oncoming nurse) by Farzana Milton (offgoing nurse). Report included the following information Nurse Handoff Report. Assessment completed and ECMO settings noted. Chest Tube site required new dressing- saturated with sanguinous drainage. 9137 Patient in A fib with rates in 100s. NP Julia informed. PO metoprolol ordered. [id number] Cardiac Surgery rounding- plan for Dr Angeline Lacy to reinforce chest tubes with additional sutures. Continue pulmonary toileting. 0935 Speech at bedside- drops of water via pipette OK. Nyastatin recommended for thrush per SLP. [address] at bedside- sutures added. MAPs around 65- RN to start Pal if pressure support is needed. If RN gets up to 100mcg of Pal, RN may start vaso per surgeon. 60mg IV lasix ordered. 65 Dr Luciana More with Nephro at bedside- updated on lasix dose given today. 1227 Patients RR in 30-40s. SBP in 150s. O2 saturation decreased in 80s. RN attempted to redirect patient and focus on taking calming breaths. Patient stating, \"this is tortuous. .. take this thing off\" (in reference to ECMO machine). PRN Versed given. Patient requesting a phone to call family- RN went to waiting room and gave update to stepmother, so she can give reassurance at bedside. Patient required varying titrations of Neosynephrine for hypotension with the highest dose being 30mcg. Patient tolerated hi-flow between 2595-5998.     1930 Bedside shift change report given to Farzana Milton (oncoming nurse) by Kevan Madison RN (offgoing nurse). Report included the following information Nurse Handoff Report.

## 2023-11-20 NOTE — PROGRESS NOTES
[facility] FLOOR Progress Note    Admit Date: 11/10/2023      Procedure:    11/13/2023 with Dr. Heather Man, ASHLEIGH BY DR Tani Yao    11/10/2023 with Dr. Claude Stanford:  RIGHT AXILLARY CUTDOWN [address]; AVR, ROOT AND ASCENGING AORTIC ANEURYSM REPAIR WITH 29MM KONECT RESILIA  AORTIC VALVED CONDUIT; ECC; CIRCULATORY ARREST; ASHLEIGH & EPIAORTIC US BY DR. Jackson Marie & DR. CANTU [facility] Synopsis:  POD0 11/10: Ascending aortic aneurysm repair and AVR with aortic valved conduit. Vfib arrest requiring cardioversion and 20 sec of CPR. Transferred to CVICU on Pneylephrine, Amiodarone, Vasopressin, Levophed, precedex and insulin. LVEF reported as Low normal, with dyskinetic iqz-hsfpqvikrsbup-bzzlgblbvbps wall motion in post op ASHLEIGH. Albumenx4, FFP, platelets, 1L LR. Neuro check moved all extremities, nodded appropriately. Wean levo, Vaso and Epi added  POD1 11/11:  Febrile. On Epi. FiO2 60% increased to 80%, ACVC PEEP 10. Nicardipine for SBP ,120. PO amio and lipitor held for elevated liver enzymes. Albuminx2. Tylenol for fever. POD2 11/12: PEEP increased to 12, FiO2 to 70% by intensivist. ET suctioned thick secretions. Pt spontaneously awakening with increasing sedation requirements. Frequent desaturations overnight. Tmax 102. On Epi with plans to transition to dobutamine. POD3 11/13: Increasing ventilation and sedation requirements. FiO2 100%. PEEP of 8 for bollous emphysema. Off Epi. Dobut 2.5 mcg. Brought to OR by Dr. Claude Stanford for placement of VV ECMO. Post op ASHLEIGH LVEF 55% with normal global function. SBP goal 120. Transfuse pltlts. POD4 11/14: Extubated. POD5 11/15: Ana catheter repositioned under ultrasound. Discussed with [facility]. Not lung transplant candidate 10 years ago. Open to eval but unlikely. Possibly discuss with [facility] for transplant if unable to bridge to recovery  POD6 11/16: Increased diureses to Lasix 60 mg IV BID. Adding Metolazone for diurese/hypernatremia.  On D50 38.5 Flagyl/Cefepime   Zosyn (11/12 - 11/20)   Ron Bowler (11/12 -   )   Flagyl (11/20 -   )   Cefepime (11/20 -   )  - Repeat BC, resp cultures, and urine sent today. - Pulm toilet: acapella, IS, cough, deep breathe, ambulate   - lactic acid 1.2    Acute Kidney injury: No known pre-operative kidney disease. Pre-op Cr 1.2  - Nephrology following - managing diuretics   - Lasix 60 mg IV yesterday. On Diuril 500 mg  - monitor Is/Os, CMP    Hypokalemia:  - Continue scheduled effer-K with addition to repletion per protocol  - K 3.8 today down from 4.1    Hx of Graves' Disease; No recent thyroid markers since 2019. No PTA. 11/10 CTA chest showed thyroid as unremarkable. Bipolar 1 Disorder with Hx of Drug abuse, PTSD  - Continue klonopin to q8h    Constipation: Resolved. BM 11/19. Continue stool softener, Miralax, PRN dulcolax     Electrolytes: Target K >4.0, Mg 2.5, Ca >8.5. Nutrition: TF via dobhoff with   Q3  GI proph: protonix  Bowel reg: senokot, glycoloax, ++ BM DVT proph: bival, SCDs    Dispo: Remain in ICU. Palliative following. CXR looks marginally worse on the right. Repeat CT today. Warm extremities. Replete K. Start D5 drip. Switch Zosyn to Flagyl/Cefepime with rpt cultures.     Signed By: Juhi Larkin PA-C
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
## 2023-11-20 NOTE — PROGRESS NOTES
0800- bedside report and drips verified. Patient awake and agitated in bed. Kathrine BLACK, Sherwin Ch NP, and Sheri Patricio MD at bedside discussing plan. Orders for CT this morning. Will coordinate with perfusionist, RT, and CT.    9399- spoke to Keiry Gaston in [address] [address]; they are ready for the patient anytime. Will coordinate with team for transport at 1000.    [id number]- patient off the unit to CT; this RN, RT, perfusionist, and 2nd RN accompanied patient. Emergency medications, ambu-bag, and monitored entire time. Patient tolerated well, VSS entire time. 200- radiologist called about CT results; informed Sheri Patricio MD and Sherwin Ch NP.     2136- blood cultures, urine culture sent to lab. RT performed right radial arterial puncture for one set. 7902 Ronna Sweeney MD at bedside; discussed low urine output after diuril this morning. Orders for lasix once IV.    1620- ABG performed; CO2 51, perfusionist increased sweep. 1730- right pleural drain placed on suction per Sheri Patricio MD.    Valarie Band- MAP 56, CVP 6-7; albumin given. 1928- MAP dropping to low 50's, 's; Ap BLACK at bedside. Started segundo for support. Orders for stat CXY, lasix, and and xanax. 2000- Bedside and Verbal shift change report given to SAMI Gusman (oncoming nurse) by Abhijeet See RN (offgoing nurse).  Report included the following information Nurse Handoff Report, Recent Results, and Cardiac Rhythm SR .

## 2023-11-20 NOTE — PROGRESS NOTES
Bedside and Verbal shift change report given to [address] (oncoming nurse) by Breezy George (offgoing nurse). Report included the following information Nurse Handoff Report and Recent Results. 2050 Pt becoming more restless, less redirectable, and more tachypneic/tachycardic/hypertensive. Per ECMO specialist, intensivist Dr. Fadia Sinclair had discussed trialing morphine for air hunger and anxiety, call placed to verify dose. Order received for 6 mg IV morphine x1. Dose given at 2059.    2125 Following morphine dose, Pt became briefly drowsy, BP dropped requiring increased Pal dose. Pt then began to escalate again, repeatedly attempting to sit up, stating he needs to leave, touching lines/tubes. Unable to be sufficiently verbally deescalated, 1 mg IV versed given. 0311 Pal gtt at 100 mcg/min, MAP 58-60. Vaso gtt started at 0.02 units/min per plan from Dr. Zaria Enriquez (relayed by day shift RN). 0410 Discussed Vaso/Pal rates with Dr. Fadia Sinclair. Orders to wean down Pal due to better BP response to Vaso. 0500 Hgb 6.8, Dr. Fadia Sinclair notified. Order received to give 1 unit PRBCs. New type and screen required per protocol. Bedside and Verbal shift change report given to Iliana Anderson (oncoming nurse) by Lan Khan (offgoing nurse). Report included the following information Nurse Handoff Report, Adult Overview, and Recent Results.

## 2023-11-20 NOTE — CARE COORDINATION
Transition of Care Plan:    RUR: 18% - moderate  Prior Level of Functioning: independent  Disposition: IPR pending medical stability  Follow up appointments: Cardiac Surgery  DME needed: none  Transportation at discharge: TBD  Caregiver Contact: father Regina liu: [phone]   Discharge Caregiver contacted prior to discharge? planned  Care Conference needed? no  Barriers to discharge: medical    Patient remains critically ill s/p aortic arch dissection and on VV ECMO. CT scan this morning showed right pneumothorax. CM met with patient's father and step-mother at the bedside. Discussed Medicaid process. PATRICK sent updated referral to [address] requesting Med Assist make contact with patient's father to address Medicaid inquires. PATRICK will continue to follow.     Wil Randall, MPH  Care Manager [facility]  Available via [employer] or

## 2023-11-20 NOTE — PROGRESS NOTES
SLP Contact Note    Attempted to see patient for swallow treatment. Pt not appropriate at this time. Will hold for now.       Wing Marco M.Ed, PhD(c), CCC-SLP  Speech-Language Pathologist

## 2023-11-20 NOTE — PROGRESS NOTES
Physical Therapy  11/20/2023    Chart reviewed. Patient with increased R sided pneumothorax per most recent CT, increased WOB, and RR 40's. Hold PT and continue to follow. Thank you.     Irma Hayes, PT, DPT

## 2023-11-20 NOTE — PROGRESS NOTES
[facility]   [address], [address] [facility], [address] [facility]  Phone: [phone]   [phone]    [email or web address]     Nephrology Progress Note    Patient Name : Glenna Parr      : 1975     MRN : [id number]  Date of Admission : 11/10/2023  Date of Servive : 23    CC: Follow up for KARINE       Assessment and Plan   Hypernatremia:  - Na excess state and lack of adequate FW replacement   - increase FW flushes to 200 cc Q 3 hrs  - diuril 500 mg Q12h. Hold for CVP < 8 cm  - start D5W at 50 cc/ hr x 24 hrs   - labs q12h     KARINE:  - from post op ATN  - resolving, nonoliguric  - cont present care     Type A aortic dissection s/p repair and AVR on 11/10     RV failure  Hypoxia, resp failure:  - VV ECMO   - per CTS service     Afib     Post op anemia:  - monitor and tx as needed     HTN:  - BP goals per CTS     Hx of Graves'  Bipolar d/o  Hx of drug abse  PTSD     Interval History:  Seen and examined. Weekend events reviewed. Has flexiseal now. Remains on VV ECMO on 1005 FIO2. Mildly resp + met alkalosis. On and off vaso. Remains on precedex. Cr trending down. Net 1L +ve. Na unchanged   Reports persistent Thirst     Review of Systems: Review of systems not obtained due to patient factors.     Current Medications:   Current Facility-Administered Medications   Medication Dose Route Frequency    vasopressin (VASOSTRICT) 20 units in sodium chloride 0.9% 100 mL infusion  0.01-0.04 Units/min IntraVENous Continuous    0.9 % sodium chloride infusion   IntraVENous PRN    QUEtiapine (SEROQUEL) tablet 50 mg  50 mg Oral Once    [Held by provider] metoprolol tartrate (LOPRESSOR) tablet 25 mg  25 mg Oral BID    nystatin (MYCOSTATIN) 484190 UNIT/ML suspension 500,000 Units  5 mL Oral 4x Daily    phenylephrine (YFN-SYNEPHRINE) 50 mg in sodium chloride 0.9 % 250 mL infusion ([id number])   mcg/min IntraVENous Continuous    magnesium hydroxide (MILK OF MAGNESIA) 400 MG/5ML Resp:  Diminished R base   CV:  Irregular,   GI:  Soft, NT, + BS, no HS megaly  Neurologic:  Non focal  Psych:            unable to assess  :  Wilson +      []    High complexity decision making was performed  []    Patient is at high-risk of decompensation with multiple organ involvement    Lab Data Personally Reviewed: I have reviewed all the pertinent labs, microbiology data and radiology studies during assessment. Labs:  Recent Labs     11/19/23  1606 11/19/23  2153 11/20/23  0408   *  152* 153* 153*   K 3.5  3.6 4.3 3.8   *  118* 120* 121*   CO2 28  28 30 29   GLUCOSE 180*  187* 157* 153*   BUN 47*  47* 46* 45*   CREATININE 1.45*  1.41* 1.38* 1.36*   CALCIUM 8.5  8.4* 8.1* 8.3*       Recent Labs     11/18/23  0418 11/19/23  0404 11/20/23  0408   WBC 12.2* 16.3* 21.4*   RBC 2.84* 2.78* 2.35*   HGB 8.2* 8.0* 6.8*   HCT 26.6* 26.0* 22.8*   MCV 93.7 93.5 97.0   MCH 28.9 28.8 28.9   MCHC 30.8 30.8 29.8*   RDW 17.2* 17.4* 18.4*   * 133* 144*   MPV 11.9 12.0 11.8     Recent Labs     11/19/23  0404 11/19/23  1606 11/20/23  0408   GLOB 3.4 3.6 3.3     Recent Labs     11/19/23  0407 11/19/23  1606 11/20/23  0415   APTT 63.5* 60.1* 62.2*      No results for input(s): \"CPK\", \"CKMB\", \"TROPONINI\" in the last 72 hours. Invalid input(s): \"B-NP\"  Invalid input(s): \"PHI\", \"PCO2I\", \"PO2I\", \"FIO2I\"     Ventilator:       Microbiology:  No results found for: \"SDES\"  No components found for: \"CULT\"      I have reviewed the flowsheets. Chart and Pertinent Notes have been reviewed. No change in PMH ,family and social history from Consult note.       Ana Luisa Barillas MD  [address] [facility]
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
## 2023-11-20 NOTE — PROGRESS NOTES
Occupational [employer]    Chart reviewed, discussed with RN who reports increased WOB and RR sustained in the 45s. Will hold and follow up as appropriate.      Eloisa Morillo MS, OTR/L

## 2023-11-20 NOTE — PROCEDURES
ECMO Management Note Day #7    Cannula: 31 Fr Dual lumen Bi-[place] cannula in RIJ (3cm insertion to end of silver rings), Placed by Dr. Josias Watts    Mode: V-V    Sweep: 6-8L per pt factors, FdO2 100%    Flow: 4.7 L @ 4300 RPM's    Oxygenator pressure delta: around 70    Anticoagulation: Bival   - PTT: 36 (goal 55-75)    Circuit Inspection: Circuit inspected  w/o visible clot or air in circuit, connections zip tied, pump-head well seated. Euroset oxygenator some clot in de-airing line, otherwise free of visible clot or air, attached to heater/cooler. POCUS w/ end of cannula noted in IVC, return jet aimed at TV        ECMO Run Significant Events:  - 11/13: Pt cannulated for V-V ECMO  - 11/14 Extubated  - 11/15 Cannula placement adjusted under POCUS, pulled back 2cm 2/2 recirculation and return jet in IVC. Past 24hrs  - No acute events o/n, goal to continue to wake pt as tolerated and begin work w/ PT if possible over weekend or early next week    Assessment/Plan:  - Pt cannulated due to hypoxemia and bilateral PTX w/ marked air leak for full lung rest   - Continue current support  - Adjust Sweep to minimize WOB/air hunger  - Discussed w/ RN and ECMO specialist at bedside    - See full daily note for further plan    Apolinar Barraza MD  Staff [address] [facility] [employer]

## 2023-11-20 NOTE — PROGRESS NOTES
Pharmacist Note - Vancomycin Dosing  Therapy day 8  Indication: empiric coverage  Current regimen: 750 mg q12h    Recent Labs     11/18/23  0418 11/18/23  0420 11/19/23  0404 11/19/23  1047 11/19/23  1606 11/19/23  2153 11/20/23  0408   WBC 12.2*  --  16.3*  --   --   --  21.4*   CREATININE 1.30   < > 1.37*   < > 1.45*  1.41* 1.38* 1.36*   BUN 43*   < > 42*   < > 47*  47* 46* 45*    < > = values in this interval not displayed. A random vancomycin level of 18.9 mcg/mL was obtained and from this level, the patient's AUC24 is calculated to be 420 with the current regimen. Goal target range AUC/PAVEL 400-500      Plan: Continue current regimen. Pharmacy will continue to monitor this patient daily for changes in clinical status and renal function. *Random vancomycin levels are used to calculate AUC/PAVEL, this level should not be interpreted as a trough. Vancomycin has been dosed using Bayesian kinetics software to target an AUC24:PAVEL of 400-600, which provides adequate exposure for as assumed infection due to MRSA with an PAVEL of 1 or less while reducing the risk of nephrotoxicity as seen with traditional trough based dosing goals.

## 2023-11-21 NOTE — PROGRESS NOTES
0800: Bedside and Verbal shift change report given to Altagracia GALLARDO (oncoming nurse) by Zuleika Koroma (offgoing nurse). Report included the following information Nurse Handoff Report. ECMO: 4300, flow 4.79, sweep 4, 100%    0800: Cardiac surgery rounds - plan for reintubation. 0915: Family in 4th floor waiting - Dr Sheri Patricio updated. 1047: 4 Versed, 15 Etomidate, 100 Michael. Dr Sheri Patricio intubated pt - 8.0 ETT - 23 @ teeth. 1055: Dr Sheri Patricio and RT performing bronchoscopy at bedside. 1057: Dr Ramon Lai at bedside - verbal order to stop D5 and increase water flush to Q2 200mL. 1122: Post intubation ABG - 7.515/38. 1/174.7/30.7/7.2/99.7%    1808: Updated Dr Sheri Patricio on pt's bleeding from mouth and ETT. 200: Spoke with Dr Sheri Patricio regarding bleeding. Verbal order to hold Bival for 2 hours and reassess PTT. 2000: Bedside and Verbal shift change report given to Zuleika Koroma (oncoming nurse) by Ernestina Gould (offgoing nurse). Report included the following information Nurse Handoff Report.

## 2023-11-21 NOTE — PROCEDURES
[facility]  Bronchoscopy Procedure Note    Procedure: Therapeutic bronchoscopy. Diagnosis:  Acute hypoxic respiratory failure, bullous emphysema    Indication:  Mucus Plugging    Consent/Treatment:  Informed consent was obtained from the  family after risks, benefits and alternatives were explained. Timeout verified the correct patient and correct procedure. Anesthesia:   Patient on ventilator and receiving  Versed      The following parameters were monitored: oxygen saturation, heart rate, blood pressure, respiratory rate, EKG, adequacy of pulmonary ventilation and response to care. Procedure Details:   -- The bronchoscope was introduced through an endotracheal tube. -- The trachea and gabrielle were completely inspected and diffuse thick mucus secretions noted, unable to visualize gabrielle until significant mucus suctioned. -- The bilateral endobronchial anatomy was completely inspected and noted to be completely plugged by thick mucus throughout bronchial tree. Irrigated and suctioned w/ clearance of secretions. Specimens:    The bronchoscope was wedged in the LLL and bronchoalveolar lavage was performed; material was sent for  microbiology      Complications: none    Estimated Blood Loss: Minimal    Scot Jimenez MD   Staff [address] [facility]

## 2023-11-21 NOTE — PROGRESS NOTES
1945: Bedside and Verbal shift change report given to Uzma RN (oncoming nurse) by Essie Torres RN (offgoing nurse). Report included the following information Nurse Handoff Report, Index, Intake/Output, MAR, Recent Results, and Cardiac Rhythm NSR - atrial fibrillation . 2000: Dr. Lucia Land at bedside. Tech at bedside for chest x-ray. IV lasix and PO given per order. 2106: Patient RR: 40s. Shallow and labored breathing. Prn IV dilaudid given. 2115: ABG results:    pH: 7.49 / CO2: 33.2 / pO2: 78.6 / HCO3: 25.1. Patient switched from CPAP to bipap 6 and rate of 18.     2315: ABG results:     pH: 7.53 / CO2: 32.3 / pO2: 75 / HCO3: 27.1. Sweep decreased to 6    2330: Patient awake, anxious. SBP: 160-170s, RR: 40-45. Prn dilaudid given IV.    0055: Patient RR continues to be in the 40s. SBP: 160-170s. Spoke with Dr. Lucia Land. Orders to give prn versed to help with agitation. 0200: ABG results:     pH: 7.47 / CO2: 36.4 / pO2: 91 / HCO3: 26.5. Perfusionist decreased Sweep to 5      0245: Dr. Lucia Land at bedside for patient update. Patient's RR 30-37. Verbal orders to increase precedex gtt to 2 mcg/kg/hr    0530: Critical lactic 2.1. Informed Intensivist. No new orders at this time. 0800: Bedside and Verbal shift change report given to Iggy Shah RN (oncoming nurse) by SAMI Gusman (offgoing nurse). Report included the following information Nurse Handoff Report, Index, and Recent Results. Patient Shift Summary:     Patient continues to wake up agitated. SBP as high as 190s, respiratory rate between 40-50. Patient given multiple doses of IV dilaudid and versed, which only helped patient's blood pressure, but did not help slow his RR. When instructed by nurse to try and slow down breathing, patient initially able to slow down for a few seconds, but is unable to any longer than that.

## 2023-11-21 NOTE — PROGRESS NOTES
[facility] FLOOR Progress Note    Admit Date: 11/10/2023      Procedure:    11/13/2023 with Dr. Lori Wolff, ASHLEIGH BY DR Abbie Ramirez    11/10/2023 with Dr. Mayito Courtney:  RIGHT AXILLARY CUTDOWN [address]; AVR, ROOT AND ASCENGING AORTIC ANEURYSM REPAIR WITH 29MM KONECT RESILIA  AORTIC VALVED CONDUIT; ECC; CIRCULATORY ARREST; ASHLEIGH & EPIAORTIC US BY DR. Dean Vicente & DR. CANTU [facility] Synopsis:  POD0 11/10: Ascending aortic aneurysm repair and AVR with aortic valved conduit. Vfib arrest requiring cardioversion and 20 sec of CPR. Transferred to CVICU on Pneylephrine, Amiodarone, Vasopressin, Levophed, precedex and insulin. LVEF reported as Low normal, with dyskinetic lkp-azzovhcokqzyt-ufwdqeffpfbi wall motion in post op ASHLEIGH. Albumenx4, FFP, platelets, 1L LR. Neuro check moved all extremities, nodded appropriately. Wean levo, Vaso and Epi added  POD1 11/11:  Febrile. On Epi. FiO2 60% increased to 80%, ACVC PEEP 10. Nicardipine for SBP ,120. PO amio and lipitor held for elevated liver enzymes. Albuminx2. Tylenol for fever. POD2 11/12: PEEP increased to 12, FiO2 to 70% by intensivist. ET suctioned thick secretions. Pt spontaneously awakening with increasing sedation requirements. Frequent desaturations overnight. Tmax 102. On Epi with plans to transition to dobutamine. POD3 11/13: Increasing ventilation and sedation requirements. FiO2 100%. PEEP of 8 for bollous emphysema. Off Epi. Dobut 2.5 mcg. Brought to OR by Dr. Mayito Courtney for placement of VV ECMO. Post op ASHLEIGH LVEF 55% with normal global function. SBP goal 120. Transfuse pltlts. POD4 11/14: Extubated. POD5 11/15: Ana catheter repositioned under ultrasound. Discussed with [facility]. Not lung transplant candidate 10 years ago. Open to eval but unlikely. Possibly discuss with [facility] for transplant if unable to bridge to recovery  POD6 11/16: Increased diureses to Lasix 60 mg IV BID. Adding Metolazone for diurese/hypernatremia.  On D50 38.5 Continue empiric coverage. On Vanc, Flagyl, Cefepime   Zosyn (11/12 - 11/20)   Vanc (11/12 -   )   Flagyl (11/20 -   )   Cefepime (11/20 -   )  - Pulm toilet: acapella, IS, cough, deep breathe, ambulate   - Lactic acid rise today from 1.2 to 2.1    Agitation/anxiety:  - Continue precedex infusion; will likely require prolonged wean from that in the future  - Continue klonopin q8h and scheduled seroquel (Intensivist managing dosing)  - Versed and dilaudid given PRN overnight, no improvement in tachypnea    Bipolar 1 Disorder with Hx of Drug abuse, PTSD  - Continue klonopin to q8h    Hx of Graves' Disease; No recent thyroid markers since 2019. No PTA. 11/10 CTA chest showed thyroid as unremarkable. Electrolytes: Target K >4.0, Mg 2.5, Ca >8.5. Nutrition: TF via dobhoff with   Q3   - giving 1 g Calcium this morning 11/21  GI proph: protonix  Bowel reg: senokot, glycoloax, ++ BM DVT proph: bival, SCDs    Dispo: Remain in ICU. Palliative following. Continued pleural air leak. Plan for intubation today. Warm extremities. Replete K. Following cultures/infection.     Signed By: Dioni Camacho PA-C

## 2023-11-21 NOTE — PROGRESS NOTES
Speech Therapy Contact Note    Chart reviewed and case discussed with RN. Patient likely to be reintubated today. Will complete SLP order; please re-consult as medically appropriate.      Isha Johnson, CCC-SLP

## 2023-11-21 NOTE — PROGRESS NOTES
Occupational Therapy    Chart reviewed, discussed with RN who reports patient with continued tachypnea and plans to possible intubate. Will defer and follow up as appropriate.      Iwona Dominguez MS, OTR/L

## 2023-11-21 NOTE — PROGRESS NOTES
Comprehensive Nutrition Assessment    Type and Reason for Visit: Reassess    Nutrition Recommendations/Plan:     Modify TF with vent and propofol: Vital 1.5 @ 55 mL/hr with 1 packet Prosource BID    Off propofol: Vital 1.5 @ 60 mL/hr with 1 packet Prosource BID    Flushes per nephrology (currently 200 mL H2o q 2 hours)         Malnutrition Assessment:  Malnutrition Status:  Insufficient data (11/14/23 1027)         Nutrition Assessment:    PMHx includes COPD, graves disease, PTSD  Pt admitted w an acute type a dissection for emergent surgical intervention. S/p emergent AVR, Ao Root/aneurysm repair/replace 11/10. Post op course c/b COPD w/ bullous emphysema, bl PTX  Placed on VV ECMO 11/13.    11/21: noted Continued tachypnea/air hunger, pt re-intubated this morning, likely plan for trach and if pt does make improvements over next 2 weeks, should discuss terminal extubation. Nephrology stopping D5 and flushes increased to 200 mL H2o q 2 hours this morning. Otherwise, has been receiving/ tolerating TF at goal.  FMS placed 11/19, now with watery brown BMs. Bowel regimen d/c'd. Weight trending back down towards UBW. Propofol @ 10.2 mL/hr providing 269 kcal.  Vital 1.5 @ 60 mL/hr with 1 packet Prosource daily and flushes of 200 mL h2o q 2 hours provides 1320 mL, 2060 kcal, 109 gm pro, 247 gm CHO, and 1003+60+1411=3277 mL free H2O. With propofol = 2369 kcal which slightly exceeds kcal needs. He could probably tolerate this just fine, but will attempt to meet protein needs a little more now that back on vent. Vital 1.5 @ 55 mL/hr with 2 packets Prosource daily provides 1210 mL, 1975 kcal, 122 gm pro, and 226 gm CHO, and 920+797=1662 mL free H2O (not including flushes)  If plans to wean off propofol, Vital 1.5 @ 60 mL/hr with 2 packets Prosource daily = 1320 mL, 2140 kcal, 129 gm pro (1.7 gm/kg). Per RN, will likely stay on propofol for now. 11/17: pt extubated 11/14. Encephalopathic.   Remains on 11/19/23  0038 11/19/23  0610 11/19/23  1125 11/19/23  1738 11/20/23  0023 11/20/23  0610 11/20/23  1309 11/20/23  1836 11/20/23  2348 11/21/23  0613 11/21/23  1146   POCGLU 185* 148* 172* 187* 156* 189* 163* 207* 149* 161* 190* 151*       Lab Results   Component Value Date/Time    LABA1C 5.1 11/10/2023 03:58 AM    LABA1C 5.5 09/09/2019 12:00 PM     11/10/2023 03:58 AM       Sondra Cortes RD  Available via [employer]

## 2023-11-21 NOTE — PROCEDURES
[facility]      Procedure Note - Intubation:   Performed by Lindsey Jorge MD .   Staff Intensivist    Diagnosis: Acute hypoxemic respiratory failure  Insertion Date: 11/21 Time:1100   Obtained Consent? Yes; informed   Procedure Location:  CVICU. Immediately prior to the procedure, the patient was reevaluated and found suitable for the planned procedure and any planned medications. Immediately prior to the procedure a time out was called to verify the correct patient, procedure, equipment, staff, and marking as appropriate. Medications given were etomidate, midazolam, and rocuronium (Zemuron). Using a MAC-4, A number 8.0 cuffed ETT was placed to 23 cm at the teeth. Placement was evaluated by noting bilateral, symmetric breath sounds, good end-tidal CO2 detector color change , chest x-ray visualization, and limited fiberoptic bronchoscopy. Attempts required: 1. Complications: none. RSI was used. The procedure was tolerated well. Apolinar Obando MD  Staff [facility]

## 2023-11-21 NOTE — PROGRESS NOTES
[facility]   [address], [address] [facility], [address] [facility]  Phone: [phone]   [phone]    [email or web address]     Nephrology Progress Note    Patient Name : Sylvie Jarquin      : 1975     MRN : [id number]  Date of Admission : 11/10/2023  Date of Servive : 23    CC: Follow up for KARINE       Assessment and Plan   Hypernatremia:  - continue D5W and current FW flushes    - could increased FW flushes and stop D5W if he gets intubated   - continue Diuresis w/ low dose lasix and high dose diuril   - labs q12h     KARINE:  - from post op ATN  - stable renal fx   - cont present care     Type A aortic dissection s/p repair and AVR on 11/10     RV failure  Hypoxia, resp failure:  - VV ECMO   - per CCM/CTS service     Afib     Post op anemia:  - monitor and tx as needed     HTN:  - BP goals per CTS     Hx of Graves'  Bipolar d/o  Hx of drug abse  PTSD     Interval History:  Seen and examined. Overnight events noted. Na trending down. Received 2 doses of lasix. Remains on BIPAP. Worsening CXR     Review of Systems: Review of systems not obtained due to patient factors.     Current Medications:   Current Facility-Administered Medications   Medication Dose Route Frequency    vasopressin (VASOSTRICT) 20 units in sodium chloride 0.9% 100 mL infusion  0.01-0.04 Units/min IntraVENous Continuous    0.9 % sodium chloride infusion   IntraVENous PRN    dextrose 5 % solution   IntraVENous Continuous    ceFEPIme (MAXIPIME) 2,000 mg in sodium chloride 0.9 % 100 mL IVPB (mini-bag)  2,000 mg IntraVENous Q8H    metronidazole (FLAGYL) 500 mg in 0.9% NaCl 100 mL IVPB premix  500 mg IntraVENous Q8H    midazolam PF (VERSED) injection 1 mg  1 mg IntraVENous Q5 Min PRN    insulin NPH (HumuLIN N;NovoLIN N) injection vial 14 Units  14 Units SubCUTAneous Q12H    ALPRAZolam (XANAX) tablet 0.5 mg  0.5 mg Oral Nightly PRN    QUEtiapine (SEROQUEL) tablet 50 mg  50 mg Oral Once    [Held by provider] metoprolol tartrate (LOPRESSOR) tablet 25 mg  25 mg Oral BID    nystatin (MYCOSTATIN) 489193 UNIT/ML suspension 500,000 Units  5 mL Oral 4x Daily    phenylephrine (YFN-SYNEPHRINE) 50 mg in sodium chloride 0.9 % 250 mL infusion ([id number])   mcg/min IntraVENous Continuous    magnesium hydroxide (MILK OF MAGNESIA) 400 MG/5ML suspension 30 mL  30 mL Oral Daily PRN    HYDROmorphone (DILAUDID) tablet 1 mg  1 mg Per G Tube Q4H PRN    HYDROmorphone (DILAUDID) tablet 2 mg  2 mg Per G Tube Q4H PRN    niCARdipine (CARDENE) 50 mg in sodium chloride 0.9 % 100 mL infusion  0-15 mg/hr IntraVENous Continuous    vancomycin (VANCOCIN) 750 mg in sodium chloride 0.9 % 250 mL IVPB ([id number])  750 mg IntraVENous Q12H    clonazePAM (KLONOPIN) tablet 1 mg  1 mg Oral Q8H    QUEtiapine (SEROQUEL) tablet 50 mg  50 mg Oral Nightly    HYDROmorphone HCl PF (DILAUDID) injection 1 mg  1 mg IntraVENous Q4H PRN    insulin lispro (HUMALOG) injection vial 0-4 Units  0-4 Units SubCUTAneous Q6H    acetaminophen (TYLENOL) tablet 975 mg  975 mg Oral Q12H PRN    potassium bicarb-citric acid (EFFER-K) effervescent tablet 40 mEq  40 mEq Oral BID    pantoprazole (PROTONIX) 40 mg in sodium chloride (PF) 0.9 % 10 mL injection  40 mg IntraVENous Daily    balsum peru-castor oil (VENELEX) ointment   Topical BID    bivalirudin trifluoroacetate (ANGIOMAX) 250 mg in sodium chloride 0.9 % 50 mL IVPB (mini-bag)  0.02-2.5 mg/kg/hr IntraVENous Continuous    ipratropium 0.5 mg-albuterol 2.5 mg (DUONEB) nebulizer solution 1 Dose  1 Dose Inhalation 4x Daily RT    budesonide (PULMICORT) nebulizer suspension 500 mcg  0.5 mg Nebulization BID RT    Vancomycin - Dosing by Pharmacy  1 each Other RX Placeholder    glucose chewable tablet 16 g  4 tablet Oral PRN    dextrose bolus 10% 125 mL  125 mL IntraVENous PRN    Or    dextrose bolus 10% 250 mL  250 mL IntraVENous PRN    glucagon injection 1 mg  1 mg SubCUTAneous PRN    dextrose 10 % infusion   IntraVENous

## 2023-11-21 NOTE — PROGRESS NOTES
Physical Therapy  11/21/2023    Patient with plans for intubation today. Noted significant WOB in bed while on CPAP. Hold PT. Thank you.     Irma Freeman, PT, DPT

## 2023-11-21 NOTE — DIABETES MGMT
[facility]  DIABETES MANAGEMENT CONSULT    Consulted by Provider for advanced nursing evaluation and care for inpatient blood glucose management. Evaluation and Action Plan   This 46year old AA male was transferred from [facility] 11/9/23 to [facility] when AA noted for emergent intervention. Patient underwent repair of AA 11/10/23. Recovering in ICU. ECMO placement 11/12/23. Had been intubated, sedated and on vent support, along with multiple drugs to support rhythm and BP 11/13/23; extubated 11/14/23 but re-intubated today (1130am) due to air hunger and agitation. Now sedated on Propofol & Precedex infusions. On IV BP support as well. Remains hypernatremic; was treated with dextrose infusion but now off. As for BG management, patient did not have diabetes PTA. Insulin needs trended down from a high of 18 units/hr to <3 units/hr at the time of transition off insulin infusion 11/12/23; given 15 units of Lantus insulin. Was on corrective insulin until a small dose of basal insulin was added 11/13/23. Tube feedings have been added, which were running at goal of 60cc/hr. Insulin switched from Lantus to NPH insulin 11/15/23. Improving Bgs on this regimen.     Management Rationale Action Plan   Medication   Basal needs Based on  [x]        Blood glucose pattern  []        Weight & BMI  [x]        Kidney dysfunction  []        Home diabetes regimen     Increase NPH insulin to 16 units twice daily   Nutritional needs Based on  []        Blood glucose pattern  []        CHO load  [x]        Enteral feeding CHO load  []        TPN/PPN dextrose load     TF running at goal rate of 55cc/hr  (246 grams of CHO/D)    Corrective insulin Based on sensitivity  [x]        Low   []        Medium  []        High      Additional orders   BG monitoring Q6 hrs          Initial Presentation   Ja Gold is a 46 y.o. male transferred 11/9/23 from [facility] ER after experiencing sudden onset of chest pain with radiation Epi. Dobut 2.5 mcg. Brought to OR by Dr. Tessy Lim for placement of VV ECMO. Post op ASHLEIGH LVEF 55% with normal global function. SBP goal 130.   23 Intubated & sedated on Precedex & Propofol infusions. On CV vent support Fi02 30%/Peep 4. Remains on , cardene and amio infusions. 23 Facial droop after extubation. Weaning infusions as possible. Possible trach tomorrow. 11/15/23 Off vent but on PAP. Still sedated. Rhythm support. Now on TF. PRBC  23 BiPap. Remains on rthythm support. KARINE. ECMO. TF running at goal rate. Hypernatremia treated with dextrose infusion (50cc/hr)  23 Abit sedated. Anxious per nursing. BiPap. On Angiomax and Dilaudid infusions. TF running @ goal rate  23 Eyes open. 02NHF. TF running. Dextrose infusion for persistent hypernatremia. Per provider, \"Continues with agitation and anxiety overnight. +/- Pal yesterday/overnight for hypotension after morphine. Normotensive and off Pal this morning during rounds. Pt conitnues to increase in BP with agitation, normotensive when calm. R pleural air leak intermittent. \"  23 Increased WOB and agitation resulting in re-intubation,. Palliative care involved    Diabetes History   No personal or family history of diabetes      Subjective   NA     Objective   Physical exam  General Normal weight male who is ill-appearing  Neuro  Sedated  Vital Signs   Vitals:    23 1400   BP:    Pulse: 93   Resp: (!) 33   Temp:    SpO2: 98%     Extremities No foot wounds    Diabetic foot exam:    Left Foot     Visual Exam: Warm & dry. Normal toe nails    Pulse DP: Trace   Right Foot   Visual Exam: Warm & dry.  Normal toe nails    Pulse DP: +2      Laboratory  Recent Labs     23  0404 23  0408 23  1311 23  0436   WBC 16.3* 21.4*  --  24.5*   HGB 8.0* 6.8* 7.4* 7.0*   HCT 26.0* 22.8* 25.5* 23.8*   MCV 93.5 97.0  --  97.5   * 144*  --  157       Recent Labs     23  2153 23  0408 23  1214

## 2023-11-22 NOTE — PROGRESS NOTES
CRITICAL CARE NOTE    Name: Vineet Kirby   : 1975   MRN: [id number]   Date: 2023      REASON FOR ICU ADMISSION:  Type A aortic dissection     PRINCIPAL ICU DIAGNOSIS   Type A aortic dissection s/p repair and AVR  Acute hypoxic respiratory failure, VV- ECMO  R pneumothorax w/ persistent airleak  Pressumed PNA  COPD, bullous emphysema  TEOFILO  HTN  KARINE  Hypernatremia  Afib, post procedure  Hx graves  Bipolar d/o  PTSD  Hx distant PSUD, current smoking    BRIEF PATIENT SUMMARY   48M txfr from OSH for emergent Type A aortic dissection repair and AVR. Patient arrives to CVICU intubated, sedated for routine post-op care. Surgery lasted approximately 12 hours, entered OR ~0400, arrived to CVICU ~1600 hrs. OR course complicated by bleeding, rec'd multiple products, VF episode requiring DCCV x 1. Underwent ECMO cannulation due to hypoxia and L PTX w/ persistent air leak in AM . Weaned off inotropic support. Extubated . COMPREHENSIVE ASSESSMENT & PLAN:SYSTEM BASED     24 HOUR EVENTS:  Pt w/ persistent significant work of breathing, air leak to R chest tube becoming continuous. Decision made to re-intubate patient, bronched w/ significant mucus plugging to entire airway. Placed on ultra lung protective setting. Remains on ECMO support.        NEUROLOGICAL:   Propofol, precedex, PRN dilaudid to goal RASS 0 to -1  Goal to transition to PO anxiolysis and pain regimen  qhs seroquel, hold clonazepam  If no improvement in anxiety and delirium, will consider other agents  Monitor mentation  Unclear if pt on PO regime for anxiety, PTSD, bipolar prior to admission    PULMONOLOGY:   Re-intubated for airway protection, ultra-lung protective settings  Oxygenation, ventilation per ECMO circuit  Monitor Chest tube air leak, low suction on R to evaluate for lung re-expansion  Diuresis to slight negative  Scheduled nebs  Empiric PNA coverage as bellow  Pulmonary hygiene    CARDIOVASCULAR:   Stable on V-V ECMO

## 2023-11-22 NOTE — PROGRESS NOTES
[facility] FLOOR Progress Note    Admit Date: 11/10/2023      Procedure:    11/13/2023 with Dr. Cassius Barahona, ASHLEIGH BY DR Medardo Hair    11/10/2023 with Dr. Deonna Chiang:  RIGHT AXILLARY CUTDOWN [address]; AVR, ROOT AND ASCENGING AORTIC ANEURYSM REPAIR WITH 29MM KONECT RESILIA  AORTIC VALVED CONDUIT; ECC; CIRCULATORY ARREST; ASHLEIGH & EPIAORTIC US BY DR. Aurora Ly & DR. CANTU [facility] Synopsis:  POD0 11/10: Ascending aortic aneurysm repair and AVR with aortic valved conduit. Vfib arrest requiring cardioversion and 20 sec of CPR. Transferred to CVICU on Pneylephrine, Amiodarone, Vasopressin, Levophed, precedex and insulin. LVEF reported as Low normal, with dyskinetic fvw-kqhmjmfiymsij-gbrdwrxjofea wall motion in post op ASHLEIGH. Albumenx4, FFP, platelets, 1L LR. Neuro check moved all extremities, nodded appropriately. Wean levo, Vaso and Epi added  POD1 11/11:  Febrile. On Epi. FiO2 60% increased to 80%, ACVC PEEP 10. Nicardipine for SBP ,120. PO amio and lipitor held for elevated liver enzymes. Albuminx2. Tylenol for fever. POD2 11/12: PEEP increased to 12, FiO2 to 70% by intensivist. ET suctioned thick secretions. Pt spontaneously awakening with increasing sedation requirements. Frequent desaturations overnight. Tmax 102. On Epi with plans to transition to dobutamine. POD3 11/13: Increasing ventilation and sedation requirements. FiO2 100%. PEEP of 8 for bollous emphysema. Off Epi. Dobut 2.5 mcg. Brought to OR by Dr. Deonna Chiang for placement of VV ECMO. Post op ASHLEIGH LVEF 55% with normal global function. SBP goal 120. Transfuse pltlts. POD4 11/14: Extubated. POD5 11/15: Ana catheter repositioned under ultrasound. Discussed with [facility]. Not lung transplant candidate 10 years ago. Open to eval but unlikely. Possibly discuss with [facility] for transplant if unable to bridge to recovery  POD6 11/16: Increased diureses to Lasix 60 mg IV BID. Adding Metolazone for diurese/hypernatremia.  On D50 38.5 Elevated liver enzymes    Hypernatremia    On tube feeding diet    Sleep apnea    Palliative care encounter       Plan/Recommendations/Medical Decision Making:     Type A Dissection s/p repair and AVR: 29 mm KR Tissue Aortic Valve Conduit  - Maintain SBP < 150  - Cont chest tubes and epicardial wires. Right chest tube with continuous air leak after reapplying to suction on 11/20  - Resume 81mg ASA daily, thrombocytopenia resolved  - Will need repeat ECHO to evaluate valve function prior to discharge; not priority at this time  - Will need repeat CTA of chest, abdomen, and pelvis to fully evaluate extent of dissection when able    Type B Dissection: Remains, dissection extends to right common and left external iliac arteries; CTA results above  - SBP as discussed  - Will need follow up with Vascular Surgery; would consult [place] closer to discharge    A-Fib, post procedure:  - Afib overnight 11/21. Amio bolus x2 with infusion  - Hold Amidoarone 400mg BID  - Maintain Mg > 2.5 and K > 4.0    Acute Right Ventricular Failure: RV function decreased on intra-op ASHLEIGH during VV ECMO. - improved/stable - monitor LFTs, CVP, periodic echos    Acute Post operative Respiratory Failure on Chronic respiratory insufficiency secondary to COPD w/bullous emphysema, smoking: s/p VV ECMO cannulation 11/13  - Intubated and sedated. Plan for trach to follow in order to allow R pleural leak to recover. - flows stable on VV ECMO 4.5L with minor adjustments to aid oxygenation. Sweep increased to 11 overnight. - Bival held this morning for oral/ET bleeding. Suspect agitation. - recirculation noted on ecmo - catheter withdrawn/repositioned 11/16 under ultrasound, immediate improvement in sats but seems to have been transient b/c PaO2 is in 40s/50s repeatedly - improved to 63 this morning  - cont Nebs and acetylcysteine (MUCOMYST)  -  Fluid management aided by nephrology.    - Pulm toilet: acapella, IS, cough, deep breathe, up in chair

## 2023-11-22 NOTE — PLAN OF CARE
Problem: Safety - Medical Restraint  Goal: Remains free of injury from restraints (Restraint for Interference with Medical Device)  Description: INTERVENTIONS:  1. Determine that other, less restrictive measures have been tried or would not be effective before applying the restraint  2. Evaluate the patient's condition at the time of restraint application  3. Inform patient/family regarding the reason for restraint  4.  Q2H: Monitor safety, psychosocial status, comfort, nutrition and hydration  Outcome: Progressing     Problem: Safety - Adult  Goal: Free from fall injury  Outcome: Progressing
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
## 2023-11-22 NOTE — SIGNIFICANT EVENT
Critical care attending brief note:    LATE ENTRY from 11/21/2023    A 78-year-old male with a sending aortic aneurysm repair and AVR, V-fib cardiac arrest with severe emphysema and bullous emphysema on ECMO. With right pneumothorax. Patient remains critically ill. He is in shock on phenylephrine. Was on high flow oxygen with respiratory rate between 40-45. He was started on  with pressure of 12/6. Initially with some improvement in respiratory rate. Patient received Dilaudid alternated with Versed for work of breathing and anxiety. Worsening of right pneumothorax with increased air leak and worsening of left pleural effusion despite receiving 80 mg of Lasix with good urine output. Patient remains on ECMO with sweep downtrending. Patient continues with significant increased work of breathing, tachypnea. Patient will benefit from intubation. Total additional critical care time spent in direct patient care was 40 minutes.       Prince Ware MD  Pulmonary Critical Care Medicine
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
## 2023-11-22 NOTE — PROGRESS NOTES
Physical Therapy    Chart Reviewed. Patient re-intubated yesterday. Currently intubated and sedated and notably also on V-V ECMO. Patient is not medically appropriate for skilled therapy at this time. Will defer.     Daniel Cunningham, PT, DPT

## 2023-11-22 NOTE — PROGRESS NOTES
0800: Bedside and Verbal shift change report given to Altagracia GLALARDO (oncoming nurse) by Leatha Herrera (offgoing nurse). Report included the following information Nurse Handoff Report. ECMO: 4100, 4.56, sweep 10, 100%. Gtts: Levo 30, Vaso 0.04, Pal 45, Amio 1    0830: Cardiac surgery rounds. Plan for bronch today. Bival to remain off d/t bleeding. 0913: 1u PRBC initiated. 5662: Lactic 2.7 - updated T Charlie REESE. Will repeat in 2 hrs.    1400: TF to remain off and continue Q2 200mL water flushes per verbal order by Dr Qamar Chavez. 1700: Pt poorly tolerated turn - requiring brief increase of pressor support. 1800: Pt continues to bleed from mouth - Bival to continue to remain off per verbal order by Dr Qamar Chavez. 2000: Bedside and Verbal shift change report given to RN (oncoming nurse) by Tamera Joshi (offgoing nurse). Report included the following information Nurse Handoff Report.

## 2023-11-22 NOTE — PROCEDURES
ECMO Management Note Day #8    Cannula: 31 Fr Dual lumen Bi-Caval [place] cannula in RIJ (3cm insertion to end of silver rings), Placed by Dr. Keith Olivares    Mode: V-V    Sweep: 7L per pt factors, FdO2 100%    Flow: 4.5 L @ 4100 RPM's    Oxygenator pressure delta: around 70    Anticoagulation: Bival   - PTT: 77 (goal 55-75)    Circuit Inspection: Circuit inspected  w/o visible clot or air in circuit, connections zip tied, pump-head well seated. Euroset oxygenator some clot in de-airing line, otherwise free of visible clot or air, attached to heater/cooler. ECMO Run Significant Events:  - 11/13: Pt cannulated for V-V ECMO  - 11/14 Extubated  - 11/15 Cannula placement adjusted under POCUS, pulled back 2cm 2/2 recirculation and return jet in IVC. - 11/21: recurrent continuous air leak, worsening pt respiratory effort, re-intubated    Past 24hrs  - recurrent continuous air leak, worsening pt respiratory effort, re-intubated    Assessment/Plan:  - Pt cannulated due to hypoxemia and bilateral PTX w/ marked air leak for full lung rest   - Continue current support  - Adjust Sweep to minimize WOB/air hunger  - Discussed w/ RN and ECMO specialist at bedside    - See full daily note for further plan    Apolinar Marie MD  Staff [phone] [employer]

## 2023-11-22 NOTE — PROGRESS NOTES
2000: Bedside and Verbal shift change report given to [facility], RN (oncoming nurse) by Ming Davis RN (offgoing nurse). Report included the following information Nurse Handoff Report, Index, and Intake/Output. ECMO:  Flow: 4.45  Speed: 4100  Sweep: 6  FdO2:  100%    Gtts:  Precedex: 1.5 mcg/kg/hr  Dilaudid: 0.5 mg/hr  Levo: 16 mcg/min  Pal: 100 mcg/min  Propofol: 25 mcg/kg/min    2045: Patient in afib, HR: 105-130. MAPs: 58-62. Levo gtt titrated to 19 mcg/min to maintain MAPs > 65. Spoke with Dr. Eulalia Wheeler and provided pt update. Verbal  orders to give amio bolus x1 and start amio gtt.     1856: ABG results:   pH: 7.3 / CO2: 54.4 / pO2: 73.4 / HCO3: 26.7  Increase Sweep to 7. Hgb: 6.2. Spoke with Intensivist. Verbal orders to give 1 unit of blood. Increase Sweep to 7.     2130: Patient MAPs continue to be between  54-58. Orders to start vaso drip. Blood transfusion started. 2145: vaso gtt started at 0.04 per MD.    8519: MAPs: 60-62; increased levo gtt to 30 mcg/min    0030: blood transfusion completed. 0045: ABG results:  pH: 7.39 / CO2:  45.5 / pO2: 79 / HCO3: 27.3. Sweep increased to 11. Repeat abgs in 30 mins    0135: RN noticed patient seemed to be apneic. Very minimal chest rising. Patient RR: 10-11 in synchrony with ventilator, and patient with decreased work of breathing. Paged RT and Intensivist to bedside. Ran ABG: pH: 7.47 / CO2: 36.5 / pO2: 80 / HO3: 26.3 sO2: 96.5    0230: Repeat ABG: pH: 7.48 / CO2: 35.2 / pO2: 84 / HCO3: 26.7 / sO2: 97    repeat Hgb: 7.2 and Hct: 23.3    0400: ABG results: pH: 7.52 /  CO2: 32.5 / pO2: 79 / HCO3: 26.7. sO2: 97. Sweep decreased to 10.     0430: Patient with increased work of breathing. RR: mid 25s. Increased Sweep back to 11.     0640: Dr. Daniele Cervantes and Dr. Eulalia Wheeler at bedside for patient update. Informed that patient still having a lot of bleeding from mouth and numerous clots. Orders to stop bival gtt. Plan to bronch patient in near future.      6106:

## 2023-11-22 NOTE — PROGRESS NOTES
[facility]   [address], [address] [facility], [address] [facility]  Phone: [phone]   [phone]    [email or web address]     Nephrology Progress Note    Patient Name : Rafal Neil      : 1975     MRN : [id number]  Date of Admission : 11/10/2023  Date of Servive : 23    CC: Follow up for KARINE       Assessment and Plan   Hypernatremia:  - resume FW flushes   - change base fluids to D5W  - continue diuresis as hemodynamics allow   - serial labs      KARINE:  - from post op ATN  - anticipate worsening renal fx due to hemodynamic instability   - no emergent need for RRT      Type A aortic dissection s/p repair and AVR on 11/10     RV failure  Hypoxia, resp failure:  - VV ECMO   - per CCM/CTS service  - re-intubated      Afib     Post op anemia:  - monitor and tx as needed     HTN:  - BP goals per CTS     Hx of Graves'  Bipolar d/o  Hx of drug abse  PTSD     Interval History:  Seen and examined. Events noted. Recurrent a fib, now on amio. Transgused overnight. Diuril held and received lasix. UOP  cc/ hr, cr worse, on vaso for hypotension. Some apneic episodes overnight ? Cheyne-mtz breathing     Review of Systems: Review of systems not obtained due to patient factors.     Current Medications:   Current Facility-Administered Medications   Medication Dose Route Frequency    ceFEPIme (MAXIPIME) 2,000 mg in sodium chloride 0.9 % 100 mL IVPB (mini-bag)  2,000 mg IntraVENous Q12H    bumetanide (BUMEX) injection 1 mg  1 mg IntraVENous q8h    insulin NPH (HumuLIN N;NovoLIN N) injection vial 16 Units  16 Units SubCUTAneous Q12H    propofol infusion  5-50 mcg/kg/min IntraVENous Continuous    HYDROmorphone HCl PF (DILAUDID) 50 mg in dextrose 5 % 50 mL infusion  0.2-4 mg/hr IntraVENous Continuous    norepinephrine (LEVOPHED) 16 mg in sodium chloride 0.9 % 250 mL infusion  1-100 mcg/min IntraVENous Continuous    HYDROmorphone HCl PF (DILAUDID) injection 1 mg  1 mg nebulizer solution 1 Dose  1 Dose Inhalation 4x Daily RT    budesonide (PULMICORT) nebulizer suspension 500 mcg  0.5 mg Nebulization BID RT    Vancomycin - Dosing by Pharmacy  1 each Other RX Placeholder    glucose chewable tablet 16 g  4 tablet Oral PRN    dextrose bolus 10% 125 mL  125 mL IntraVENous PRN    Or    dextrose bolus 10% 250 mL  250 mL IntraVENous PRN    glucagon injection 1 mg  1 mg SubCUTAneous PRN    dextrose 10 % infusion   IntraVENous Continuous PRN    0.9 % sodium chloride infusion   IntraVENous Continuous    0.45 % sodium chloride infusion   IntraVENous Continuous    sodium chloride flush 0.9 % injection 5-40 mL  5-40 mL IntraVENous 2 times per day    sodium chloride flush 0.9 % injection 5-40 mL  5-40 mL IntraVENous PRN    ondansetron (ZOFRAN) injection 4 mg  4 mg IntraVENous Q4H PRN    aspirin EC tablet 81 mg  81 mg Oral Daily    lidocaine 4 % external patch 2 patch  2 patch Topical Daily    [Held by provider] amiodarone (CORDARONE) tablet 400 mg  400 mg Oral BID    chlorhexidine (PERIDEX) 0.12 % solution 15 mL  15 mL Mouth/Throat BID    hydrALAZINE (APRESOLINE) injection 10 mg  10 mg IntraVENous Q6H PRN    midazolam PF (VERSED) injection 1 mg  1 mg IntraVENous Q1H PRN    diphenhydrAMINE (BENADRYL) capsule 25 mg  25 mg Oral Nightly PRN    bisacodyl (DULCOLAX) suppository 10 mg  10 mg Rectal Daily PRN    [Held by provider] atorvastatin (LIPITOR) tablet 40 mg  40 mg Oral Nightly    potassium chloride 20 mEq/50 mL IVPB (Central Line)  20 mEq IntraVENous PRN    magnesium sulfate 2000 mg in 50 mL IVPB premix  2,000 mg IntraVENous PRN    albuterol (PROVENTIL) (2.5 MG/3ML) 0.083% nebulizer solution 2.5 mg  2.5 mg Nebulization Q4H PRN    melatonin tablet 6 mg  6 mg Oral Nightly PRN    dexmedetomidine (PRECEDEX) 400 mcg in sodium chloride 0.9 % 100 mL infusion  0.1-1.5 mcg/kg/hr IntraVENous Continuous    albumin human 5% IV solution 12.5 g  12.5 g IntraVENous PRN      No Known

## 2023-11-22 NOTE — PROCEDURES
ECMO Management Note Day #9    Cannula: 31 Fr Dual lumen Bi-Caval [place] cannula in RIJ (3cm insertion to end of silver rings), Placed by Dr. Singleton Age    Mode: V-V    Sweep: 11L , FdO2 100%    Flow: 4.5 L @ 4100 RPM's    Oxygenator pressure delta: around 70    Anticoagulation: Bival, held     Circuit Inspection: Circuit inspected  w/o visible clot or air in circuit, connections zip tied, pump-head well seated. Euroset oxygenator some clot in de-airing line, otherwise free of visible clot or air, attached to heater/cooler. ECMO Run Significant Events:  - 11/13: Pt cannulated for V-V ECMO  - 11/14 Extubated  - 11/15 Cannula placement adjusted under POCUS, pulled back 2cm 2/2 recirculation and return jet in IVC. - 11/21: recurrent continuous air leak, worsening pt respiratory effort, re-intubated  - 11/22 Bival held due to tracheal and  oropharynx bleeding    Past 24hrs  - Bival held due to tracheal and  oropharynx bleeding    Assessment/Plan:  - Pt cannulated due to hypoxemia and bilateral PTX w/ marked air leak for full lung rest   - stop anticoagulation  - Continue current support  - Sweep at 11 to decrease drive  - Discussed w/ RN and ECMO specialist at bedside    - See full daily note for further plan    Apolinar Muller MD  Staff [address] [employer]

## 2023-11-23 NOTE — PROCEDURES
ECMO Management Note Day #10    Cannula: 31 Fr Dual lumen Bi-Caval [place] cannula in RIJ (3cm insertion to end of silver rings), Placed by Dr. Lele Calderon    Mode: V-V    Sweep: 10L , FdO2 100%    Flow: 4.4 L @ 4100 RPM's    Anticoagulation: Bival, held     Circuit Inspection: Circuit inspected  w/o visible clot or air in circuit, connections zip tied, pump-head well seated. Euroset oxygenator some clot in de-airing line, otherwise free of visible clot or air, attached to heater/cooler. ECMO Run Significant Events:  - 11/13: Pt cannulated for V-V ECMO  - 11/14 Extubated  - 11/15 Cannula placement adjusted under POCUS, pulled back 2cm 2/2 recirculation and return jet in IVC. - 11/21: recurrent continuous air leak, worsening pt respiratory effort, re-intubated  - 11/22 Bival held due to tracheal and  oropharynx bleeding    Past 24hrs  - No acute changes from ECLS management standpoint today    Assessment/Plan:  - Pt cannulated due to hypoxemia and bilateral PTX w/ marked air leak for full lung rest   - off anticoagulation  - Continue current support  - Sweep at 8-11 to decrease drive  - Discussed w/ RN and ECMO specialist at bedside    - See full daily note for further plan    Apolinar Aguirre MD  Staff [address]

## 2023-11-23 NOTE — PROGRESS NOTES
Pharmacist Note - Vancomycin Dosing  Therapy day 11  Indication: empiric coverage   - s/p emergent AVR, Type A aortic dissection repair on 11/10  - placed on VV ECMO 11/13  Current regimen: dosing by levels (previously 750 mg IV every hours)    Recent Labs     11/22/23  0318 11/22/23  0824 11/22/23  1408 11/22/23  2211 11/23/23  0405   WBC  --    < > 32.8* 34.1* 31.2*   CREATININE 1.87*  --  2.04*  --  2.18*   BUN 69*  --  76*  --  82*    < > = values in this interval not displayed. A random vancomycin level of 22.8 mcg/mL was obtained and from this level, the patient's AUC24 is calculated to be 708 with the current regimen. Goal target range AUC/PAVEL 400-600 or trough 10-15 mcg/mL      Plan: Continue to dose by levels. No additional doses ordered at this time. Follow-up random level tomorrow AM to further guide dosing. Pharmacy will continue to monitor this patient daily for changes in clinical status and renal function.

## 2023-11-23 NOTE — PROGRESS NOTES
as bellow  Pulmonary hygiene    CARDIOVASCULAR:   Levophed, vaso, segundo gtt  Trend lct  MAP goal >65  PRN cardene for SBP <150  amiodarone    GASTROINTESTINAL   TF, FWF  PPI  Bowel regimen     RENAL/ELECTROLYTE/FLUIDS:   Strict I/Os  Monitor Na  Nephrology following  Renally dose medications  RFP  Mg/K/Phos->2/3/4    ENDOCRINE:   Goal euglycemia  Unclear if pt remains on methimazole for Graves' disease    HEMATOLOGY/ONCOLOGY:   [facility]   CBC    INFECTIOUS DISEASE:   BAL Cx 11/21, heavy presumed pseudomonas  Bcx NGTD  ID following    C/w Vanc, added merrem, inh tobra, mateo    Prior cefepime, zosyn, flagyl    ICU DAILY CHECKLIST   Code Status:Full  DVT Prophylaxis:bival  T/L/D: ETT, miles CVC, [place], ECMO cannula  SUP: PPI  Diet: TF  Activity Level:ad  donnie  ABCDEF Bundle/Checklist Completed:Yes  Disposition: ICU  Multidisciplinary Rounds Completed: yes  Goals of Care Discussion/Palliative: yes  Patient/Family Updated: yes      [address]   11/10 admitted to CVICU post emergent aortic repair and AVR  11/11 - adequate UOP, wakes agitated, +/- redirectable, follows commands. 11/12 - CI improved, remains on Epi, [place], Cardene, good UOP. Desats occaisonally, CXR stable  11/13 ECMO cannulation due to hypoxia and L PTX w/ persistent air leak   11/14 Extubated  11/15 Cannula adjusted, 1u pRBC  11/21 re-intubated  11/22 bival stopped due to bleeding from trachea and oropharynx    SUBJECTIVE   Review of Systems   Unable to obtain due to pt factors    OBJECTIVE   Physical Exam  Vitals and nursing note reviewed. Constitutional:       Appearance: He is ill-appearing. Comments: Intubated, sedated   HENT:      Head: Normocephalic and atraumatic. Nose: Nose normal. No congestion. Mouth/Throat:      Mouth: Mucous membranes are moist.      Pharynx: Oropharynx is clear. No oropharyngeal exudate. Eyes:      General: No scleral icterus. Extraocular Movements: Extraocular movements intact. bedside nurses and the respiratory therapist.      NOTE OF PERSONAL INVOLVEMENT IN CARE   This patient has a high probability of imminent, clinically significant deterioration, which requires the highest level of preparedness to intervene urgently. I participated in the decision-making and personally managed or directed the management of the following life and organ supporting interventions that required my frequent assessment to treat or prevent imminent deterioration. I personally spent 35 minutes of critical care time. This is time spent at this critically ill patient's bedside actively involved in patient care as well as the coordination of care. This does not include any procedural time which has been billed separately.     Marcin De Leon MD   Critical Care Medicine  [facility]

## 2023-11-23 NOTE — PROGRESS NOTES
0800 Bedside shift change report given to [address] (oncoming nurse) by Adrianne Nyhan (offgoing nurse). Report included the following information Nurse Handoff Report. Assessment completed and ECMO settings noted. Cardiac Surgery rounding- plan to continue with antibiotics. RN to hold PO aspirin per surgeon. 1425 1unit PRBCs ordered. 1500 PRBCs infusion initiated. 0 RN providing mouthcare to patient and noticed large thrombus in back of throat- MD informed- leave in place. 1930 Bedside shift change report given to [address] [place] [address] Pal[place] (oncoming nurse) by Phillip Curry RN (offgoing nurse). Report included the following information Nurse Handoff Report.

## 2023-11-23 NOTE — CARE COORDINATION
Transition of Care Plan:       RUR: 18% - moderate  Prior Level of Functioning: independent  Disposition: IPR pending medical stability  Follow up appointments: Cardiac Surgery  DME needed: none  Transportation at discharge: TBD  Caregiver Contact: father Regina Paris - p: [phone]   Discharge Caregiver contacted prior to discharge? planned  Care Conference needed? no  Barriers to discharge: medical    Chart reviewed. Patient remains critically ill. Re-intubated on VV ECMO. Patient may have tracheostomy placed soon. CM will continue to remotely follow for clinical improvement.     Brianne Stringer, MPH  Care Manager [facility]  Available via [facility] or  [email or web address]

## 2023-11-23 NOTE — PROGRESS NOTES
[facility]   [address], [address] [facility], [address] [facility]  Phone: [phone]   [phone]    [email or web address]     Nephrology Progress Note    Patient Name : Hyacinth Andrade      : 1975     MRN : [id number]  Date of Admission : 11/10/2023  Date of Servive : 23    CC: Follow up for KARINE       Assessment and Plan   Hypernatremia:  - continue current FW flushes   - continue diuresis as hemodynamics allow   - serial labs      KARINE:  - from post op ATN  - anticipate worsening renal fx due to hemodynamic instability   - no emergent need for RRT      Type A aortic dissection s/p repair and AVR on 11/10     RV failure  Hypoxia, resp failure:  - VV ECMO   - per CCM/CTS service  - re-intubated      Afib     Post op anemia:  - monitor and tx as needed     HTN:  - BP goals per CTS     Hx of Graves'  Bipolar d/o  Hx of drug abse  PTSD     Interval History:  Seen and examined. Some improvement in last 24 hrs. I/O net +ve, not all accurate due to amio gtt. Na stable. Cr slightly worse. Hemodynamics better     Review of Systems: Review of systems not obtained due to patient factors.     Current Medications:   Current Facility-Administered Medications   Medication Dose Route Frequency    chlorothiazide (DIURIL) 250 mg in sterile water 9 mL injection  250 mg IntraVENous Q12H    bumetanide (BUMEX) injection 1 mg  1 mg IntraVENous q8h    0.9 % sodium chloride infusion   IntraVENous PRN    QUEtiapine (SEROQUEL) tablet 50 mg  50 mg Oral BID    tobramycin (PF) (RAQUEL) nebulizer solution 300 mg  300 mg Nebulization BID RT    balsum peru-castor oil (VENELEX) ointment   Topical BID    0.9 % sodium chloride infusion   IntraVENous PRN    meropenem (MERREM) 1,000 mg in sodium chloride 0.9 % 100 mL IVPB (mini-bag)  1,000 mg IntraVENous Q8H    insulin NPH (HumuLIN N;NovoLIN N) injection vial 16 Units  16 Units SubCUTAneous Q12H    propofol infusion  5-50 mcg/kg/min IntraVENous human 5% IV solution 12.5 g  12.5 g IntraVENous PRN      No Known Allergies    Objective:  Vitals:    Vitals:    11/23/23 0300 11/23/23 0456 11/23/23 0458 11/23/23 0518   BP: 117/63      Pulse: (!) 105 (!) 101 (!) 102    Resp: 10 14     Temp:       TempSrc:       SpO2:       Weight:    86 kg (189 lb 9.6 oz)   Height:         Intake and Output:  No intake/output data recorded. 11/21 1901 - 11/23 0700  In: 40985.8 [I.V.:5047.1]  Out: 1396 [AEKUT:1466]    Physical Examination:  General: On vent   Neck:  R neck ECMO cannulas   Resp:  Diminished R base   CV:  Irregular,   GI:  Soft, NT, + BS, no HS megaly  Neurologic:  Unable to assess  :  Wilson +      []    High complexity decision making was performed  []    Patient is at high-risk of decompensation with multiple organ involvement    Lab Data Personally Reviewed: I have reviewed all the pertinent labs, microbiology data and radiology studies during assessment. Labs:  Recent Labs     11/22/23  0318 11/22/23  1408 11/23/23  0405   * 150* 147*   K 4.5 4.8 4.0   * 118* 115*   CO2 28 28 24   GLUCOSE 139* 153* 142*   BUN 69* 76* 82*   CREATININE 1.87* 2.04* 2.18*   CALCIUM 7.8* 7.5* 7.8*         Recent Labs     11/22/23  1408 11/22/23  2211 11/23/23  0013 11/23/23  0405   WBC 32.8* 34.1*  --  31.2*   RBC 2.51* 2.15*  --  2.50*   HGB 7.3* 6.3* 7.3* 7.5*   HCT 23.9* 20.4* 23.3* 23.6*   MCV 95.2 94.9  --  94.4   MCH 29.1 29.3  --  30.0   MCHC 30.5 30.9  --  31.8   RDW 18.9* 19.3*  --  17.8*   * 107*  --  104*   MPV 12.5 12.5  --  12.9       Recent Labs     11/21/23  0436 11/22/23  0318 11/23/23  0405   GLOB 3.5 3.3 2.9       Recent Labs     11/21/23  1544 11/22/23  0318 11/22/23  0605   APTT 77.6* 27.4 55.0*        No results for input(s): \"CPK\", \"CKMB\", \"TROPONINI\" in the last 72 hours.     Invalid input(s): \"B-NP\"  Invalid input(s): \"PHI\", \"PCO2I\", \"PO2I\", \"FIO2I\"     Ventilator:       Microbiology:  No results found for: \"SDES\"  No components found

## 2023-11-23 NOTE — PROGRESS NOTES
Renal Dosing/Monitoring  Medication: meropenem   Current regimen:  1 g every 8 hr  Recent Labs     11/22/23  0318 11/22/23  1408 11/23/23  0405   CREATININE 1.87* 2.04* 2.18*   BUN 69* 76* 82*     Estimated CrCl:  46 ml/min  Plan: Change to 1 g IV every 12 hours per [facility] P&T Committee Protocol with respect to renal function. Pharmacy will continue to monitor patient daily and will make dosage adjustments based upon changing renal function.

## 2023-11-24 NOTE — PROGRESS NOTES
CRITICAL CARE NOTE    Name: Imelda Dias   : 1975   MRN: [id number]   Date: 2023      REASON FOR ICU ADMISSION:  Type A aortic dissection     PRINCIPAL ICU DIAGNOSIS   Type A aortic dissection s/p repair and AVR  Acute hypoxic respiratory failure, VV- ECMO  Shock, presume septic  R pneumothorax w/ persistent airleak  PNA  COPD, bullous emphysema  TEOFILO  HTN  KARINE  Hypernatremia  Afib, post procedure  Hx graves  Bipolar d/o  PTSD  Hx distant PSUD, current smoking    BRIEF PATIENT SUMMARY   48M txfr from OSH for emergent Type A aortic dissection repair and AVR. Patient arrives to CVICU intubated, sedated for routine post-op care. Surgery lasted approximately 12 hours, entered OR ~0400, arrived to CVICU ~1600 hrs. OR course complicated by bleeding, rec'd multiple products, VF episode requiring DCCV x 1. Underwent ECMO cannulation due to hypoxia and L PTX w/ persistent air leak in AM . Weaned off inotropic support. Extubated . O/n - pt w/ persistent significant work of breathing, air leak to R chest tube becoming continuous. Decision made to re-intubate patient, bronched w/ mucus plugging to entire airway. Pt w/ increasing vasopressor requirements 2/2 pseudomonas PNA. Bival stopped due to oral and tracheal bleeding. COMPREHENSIVE ASSESSMENT & PLAN:SYSTEM BASED     24 HOUR EVENTS:  Improving vasopressor requirements, oral bleeding improved. Will plan for trach in next several days if continues to progress from hemodynamic standpoint.      NEUROLOGICAL:   Propofol, precedex, dilaudid to goal RASS 0 to -1, -2 if significant WOB  Increased sweep flow to aid w/ drive/ air hunger  BID seroquel  hold clonazepam, resume when able to keep more awake    PULMONOLOGY:   Re-intubated for airway protection, ultra-lung protective settings  Plan for trach in next couple days if possible  Oxygenation, ventilation per ECMO circuit  Monitor Chest tube air leak, suction on R and mediastinal  Diuresis

## 2023-11-24 NOTE — PROGRESS NOTES
Physical Therapy  11/24/2023    Chart reviewed. Remains sedated, on high doses of pressors, and on ventilator. Not appropriate for PT at this time. Will follow peripherally. Thank you.     Irma Farah, PT, DPT

## 2023-11-24 NOTE — PROCEDURES
ECMO Management Note Day #11    Cannula: 31 Fr Dual lumen Bi-[employer]aval [employer] cannula in RIJ (3cm insertion to end of silver rings), Placed by Dr. Awilda Bernheim    Mode: V-V    Sweep: 8-10L , FdO2 100%    Flow: 4.6 L @ 4300 RPM's    Anticoagulation: Bival, held     Circuit Inspection: Circuit inspected  w/o visible clot or air in circuit, connections zip tied, pump-head well seated. Euroset oxygenator some increased in visible segments clot in de-airing line, otherwise free of visible clot or air, attached to heater/cooler. ECMO Run Significant Events:  - 11/13: Pt cannulated for V-V ECMO  - 11/14 Extubated  - 11/15 Cannula placement adjusted under POCUS, pulled back 2cm 2/2 recirculation and return jet in IVC. - 11/21: recurrent continuous air leak, worsening pt respiratory effort, re-intubated  - 11/22 Bival held due to tracheal and  oropharynx bleeding    Past 24hrs  - No acute changes from ECLS management standpoint today    Assessment/Plan:  - Pt cannulated due to hypoxemia and bilateral PTX w/ marked air leak for full lung rest   - off anticoagulation  - Continue current support  - Sweep at 8-11 to decrease drive  - Discussed w/ RN and ECMO specialist at bedside    - See full daily note for further plan    Apolinar Li MD  Staff [phone] [employer]

## 2023-11-24 NOTE — PROGRESS NOTES
1930 Bedside shift change report given to [facility] Lisette Culver (oncoming nurse) by Cynthia Be RN (offgoing nurse). 2000 ABG performed. PH 7.57 co2 29, po2 26.7 hco3 26.7  No changes per provider. Potassium 3.6. 40 meq given po scheduled nightly med.    2300  Potassium 4.1 H& H  8.3 & 26    0000 Abg 7.57 co2 28.3 po2 93.3 hco3 26.4 no changes per provider . 0400 ABG 7.61 co2 26 po2 86, hco3 26.5. Sweep decreased to 8 per MD, and ecmo specialist. Potassium 3.6 20 meq given IV. Crea 2.14    0600. Pt did not tolerate big turn. Agitated and restless. Prn given Abg repeat. PH 7.49 co2 36.6 po2 63. Hco3 27    0750 pt converted to SR     0800 Bedside and Verbal shift change report given to Dian GALLARDO  (oncoming nurse) by Vika Smith RN  (offgoing nurse). Report included the following information Nurse Handoff Report, Adult Overview, Surgery Report, Intake/Output, MAR, Recent Results, Cardiac Rhythm SR, Alarm Parameters, Neuro Assessment, and Event Log. Uop over 125cc/hr. Levo weaned to 18mcg. Vaso . 04. Prop, dex, dilaudid, and amio infusing. . Minimal bleeding from mouth. Pt has cuff leak from ett tube. Minimal chest tube drainage .

## 2023-11-24 NOTE — PROGRESS NOTES
steatosis There is no intrahepatic duct dilatation. The  CBD is not dilated. BONES: No destructive bone lesion. . No lytic or blastic lesions. No evidence of  acute fracture or dislocation. IMPRESSION:  Interval increased size of right-sided pneumothorax. Bibasilar atelectasis/pneumonia with small bilateral effusions. Assessment:     Patient Active Problem List   Diagnosis    S/P ascending aortic aneurysm repair    S/P aortic valve replacement    Aortic arch dissection (HCC)    Aortic dissection (HCC)    Respiratory failure (HCC)    Hyperglycemia    KARINE (acute kidney injury) ([address])    Elevated liver enzymes    Hypernatremia    On tube feeding diet    Sleep apnea    Palliative care encounter       Plan/Recommendations/Medical Decision Making:     Type A Dissection s/p repair and AVR: 29 mm KR Tissue Aortic Valve Conduit  - Maintain SBP < 150  - Cont chest tubes and epicardial wires. Right chest tube with continuous air leak after reapplying to suction on 11/20; mediastinal CT also to suction    - ? Pull wires tomorrow   - Holding ASA again (11/22) with oral bleeding   - Will need repeat ECHO to evaluate valve function prior to discharge; not priority at this time  - Will need repeat CTA of chest, abdomen, and pelvis to fully evaluate extent of dissection when able    Type B Dissection: Remains, dissection extends to right common and left external iliac arteries; CTA results above  - SBP as discussed  - Will need follow up with Vascular Surgery; would consult [place] closer to discharge    A-Fib, post procedure:  - Afib overnight 11/21. Amio bolus x2 and drip, decrease to 0.5mg/min  - Hold Amidoarone 400mg BID  - Maintain Mg > 2.5 and K > 4.0    Acute Right Ventricular Failure: RV function decreased on intra-op ASHLEIGH during VV ECMO.   - improved/stable - monitor LFTs, CVP, periodic echos    Acute Post operative Respiratory Failure on Chronic respiratory insufficiency secondary to COPD w/bullous emphysema, Nephrology   - BMP daily    Acute Post operative Transaminitis: secondary to acute RV failure and shock liver  - d/c amio gtt, transition to oral only -- decrease to 0.5mg today   - tylenol 2 gm 24 hour max  - Tbili rising again, most likely shock liver with increased pressor requirements and what appears to be worsening sepsis    Acute postoperative blood loss anemia: Secondary to consumption, critical illness, and bleeding  - Hgb 8.4, no acute need for transfusion   - CT output stable  - Repeat CBC this afternoon    Thrombocytopenia; Improved; Above transfusion goal. Likely d/t consumption between emergent surgery and ECMO cannulation  - Hold bival drip for bleeding  - ASA held again 11/22  - CBC daily    Leukocytosis:   - WBC stable elevated 30.6, afebrile   - Blood cultures 11/20 NGTD  - Respiratory culture 11/22 + heavy pseudomonas aeruginosa, ID consulted -- ABX as below  Zosyn (11/12-11/20) and vancomycin (11/12 - )   Changed to cefepime and flagyl on 11/20 for continued rising WBC (11/20-11/22)  [place] added after pseudomonas results (11/22 - )  Micafungin added (11/23 - )   Fungitell pending  Tobramycin added (11/22 - )  - Lactic acid 1.6  - Continue to trend WBC, Lactic, Procal    Pseudomonas VAP: Resp cx 11/22 + pseudo on bronchial washings  - ABX as discussed above     Agitation/anxiety:  - Continue precedex infusion; will likely require prolonged wean from that in the future  - Seroquel 50mg BID    Bipolar 1 Disorder with Hx of Drug abuse, PTSD  - Clonazepam stopped 11/22    Hx of Graves' Disease; No recent thyroid markers since 2019. No PTA. 11/10 CTA chest showed thyroid as unremarkable. Electrolytes: Target K >4.0, Mg 2.5, Ca >8.5. Nutrition: TF via dobhoff with   Q3  GI proph: protonix  Bowel reg: senokot, glycoloax, BM DVT proph: bival, SCDs    Dispo: Remain in ICU. Pressor requirements slightly improved past 24 hours with decrease in Sweep.  No acute changes today, continue to diurese

## 2023-11-24 NOTE — PROGRESS NOTES
[facility]   [address], [address] [facility], [address] [facility]  Phone: [phone]   [phone]    [email or web address] ([email or web address])     Nephrology Progress Note    Patient Name : Wilda Lockhart      : 1975     MRN : [id number]  Date of Admission : 11/10/2023  Date of Servive : 23    CC: Follow up for KARINE       Assessment and Plan   Hypernatremia:  - continue current FW flushes   - continue current diuretics  - serial labs      KARINE:  - from post op ATN  -stable renal fx   - no emergent need for RRT      Type A aortic dissection s/p repair and AVR on 11/10     RV failure  Hypoxia, resp failure:  - VV ECMO   - per CCM/CTS service  - re-intubated      Afib     Post op anemia:  - monitor and tx as needed     HTN:  - BP goals per CTS     Hx of Graves'  Bipolar d/o  Hx of drug abse  PTSD     Interval History:  Seen and examined. Na better. Adequate diuresis, CXR slightly better     Review of Systems: Review of systems not obtained due to patient factors.     Current Medications:   Current Facility-Administered Medications   Medication Dose Route Frequency    chlorothiazide (DIURIL) 250 mg in sterile water 9 mL injection  250 mg IntraVENous Q12H    bumetanide (BUMEX) injection 1 mg  1 mg IntraVENous Q8H    meropenem (MERREM) 1,000 mg in sodium chloride 0.9 % 100 mL IVPB (mini-bag)  1,000 mg IntraVENous Q12H    micafungin (MYCAMINE) 150 mg in sodium chloride 0.9 % 100 mL IVPB  150 mg IntraVENous Q24H    0.9 % sodium chloride infusion   IntraVENous PRN    QUEtiapine (SEROQUEL) tablet 50 mg  50 mg Oral BID    tobramycin (PF) (RAQUEL) nebulizer solution 300 mg  300 mg Nebulization BID RT    balsum peru-castor oil (VENELEX) ointment   Topical BID    insulin NPH (HumuLIN N;NovoLIN N) injection vial 16 Units  16 Units SubCUTAneous Q12H    propofol infusion  5-50 mcg/kg/min IntraVENous Continuous    HYDROmorphone HCl PF (DILAUDID) 50 mg in dextrose 5 % 50 mL infusion  0.2-4 mg/hr

## 2023-11-24 NOTE — PROGRESS NOTES
0800: Bedside shift change report given to Dian SUBRAMANIAN RN (oncoming nurse) by Diego Singh RN (offgoing nurse). Report included the following information Nurse Handoff Report, Intake/Output, MAR, Recent Results, Cardiac Rhythm NSR, and Alarm Parameters. Vent: AC/PC PS 10, rate 10, FiO2 40%, PEEP 3, Vt     Gtts: Norepi 17, Vaso 0.04, prop 35, dil 0.5, dex 1.5    ECMO: speed 4100, flows 3.8, FdO2 100%, sweep 8    Cardiac surgery team at bedside, continue current plan of care    0825: ABG 7.54 / 31 / 59 / 26.5 / 3.9 / -3.9 / ical 1.09 . Speeds increased to 4300, flows now 4.55    0830: spoke with Andrea George MD. Tila Bevels to give calcium for ical 1.09, start trickle feeds, keep spO2 >85%    1015: Andrea George MD updating family at bedside    1130: TF restarted per previous order. Clarified FW flushes with Mercy Napoles MD, decrease to 200mL q3h    1204: AB.48 / 35 / 83.5 / 26 / -2.3 / 97 . No changes indicated    1301: ABG repeated to assess accuracy of spO2 d/t large fluctuations in readings. AB.542 / 30.7 / 81.8 / 26.4 / -3.8 / 97.4 . spO2 100% at this time    1520: pt opening eyes, nods head appropriately intermittently. Nods head yes when asked if in pain. Dilaudid gtt increased    1603: AB.56 / 30.7 / 70 / 27.2 / 96.1 / -4.7 / ical 1.21 . Pre and post gases obtained by ECMO specialist    1610: pts Vt noted to be markedly decreased from earlier volumes, now 30-50 instead of . Andrea George MD notified, concerns addressed    : Bedside shift change report given to [address] [facility] [place] (oncoming nurse) by Rahel Villavicencio RN (offgoing nurse). Report included the following information Nurse Handoff Report, Intake/Output, MAR, Recent Results, Cardiac Rhythm NSR, and Alarm Parameters.      Vent: AC/PC PS 10, rate 10, FiO2 40%, PEEP 3, Vt 30-80    Gtts: Norepi 8, Vaso 0.04, prop 30, dil 0.7, dex 1.5    ECMO: speed 4300, flows 4.4-4.5, FdO2 100%, sweep 7

## 2023-11-25 NOTE — PROCEDURES
ECMO Management Note Day #12    Cannula: 31 Fr Dual lumen Bi-Caval [place] cannula in RIJ (3cm insertion to end of silver rings), Placed by Dr. Mayito Courtney    Mode: V-V    Sweep: 6L , FdO2 100%    Flow: 4.6 L @ 4300 RPM's    Anticoagulation: Bival, held     Circuit Inspection: Circuit inspected  w/o visible clot or air in circuit, connections zip tied, pump-head well seated. Euroset oxygenator some increased in visible segments clot in de-airing line, otherwise free of visible clot or air, attached to heater/cooler. Increasing oxygenator delta, off AC for 5 days, monitor post gasses to see if he requires oxygenator change. ECMO Run Significant Events:  - 11/13: Pt cannulated for V-V ECMO  - 11/14 Extubated  - 11/15 Cannula placement adjusted under POCUS, pulled back 2cm 2/2 recirculation and return jet in IVC. - 11/21: recurrent continuous air leak, worsening pt respiratory effort, re-intubated  - 11/22 Bival held due to tracheal and  oropharynx bleeding    Past 24hrs  - No acute changes from ECLS management standpoint today    Assessment/Plan:  - Pt cannulated due to hypoxemia and bilateral PTX w/ marked air leak for full lung rest   - off anticoagulation  - Continue current support  - Sweep to a degree of hypocapnia to decrease drive  - Discussed w/ RN and ECMO specialist at bedside    - See full daily note for further plan    Apolinar Powell MD  Staff [phone] [employer]

## 2023-11-25 NOTE — PROGRESS NOTES
Renal Dosing/Monitoring  Medication: Meropenem   Current regimen:  1 gram IV every 12 hr  Recent Labs     11/24/23  0406 11/24/23  1503 11/25/23  0404   CREATININE 2.14* 1.95* 1.89*   BUN 85* 84* 80*     Estimated CrCl:  53 ml/min  Plan: Change to 1 gram IV every 8 hours per [facility] P&T Committee Protocol with respect to renal function. Pharmacy will continue to monitor patient daily and will make dosage adjustments based upon changing renal function.

## 2023-11-25 NOTE — PROGRESS NOTES
Arterial Blood Gas, POC    Collection Time: 11/25/23  4:01 AM   Result Value Ref Range    POC pH 7.51 (H) 7.35 - 7.45      POC pCO2 39.3 35.0 - 45.0 MMHG    POC PO2 353 (H) 80 - 100 MMHG    POC HCO3 31.1 (H) 22 - 26 MMOL/L    POC O2 .0 (H) 92 - 97 %    Base Excess 7.5 mmol/L    Specimen type: ARTERIAL     CBC    Collection Time: 11/25/23  4:04 AM   Result Value Ref Range    WBC 21.1 (H) 4.1 - 11.1 K/uL    RBC 2.55 (L) 4.10 - 5.70 M/uL    Hemoglobin 7.3 (L) 12.1 - 17.0 g/dL    Hematocrit 24.1 (L) 36.6 - 50.3 %    MCV 94.5 80.0 - 99.0 FL    MCH 28.6 26.0 - 34.0 PG    MCHC 30.3 30.0 - 36.5 g/dL    RDW 19.1 (H) 11.5 - 14.5 %    Platelets 76 (L) 693 - 400 K/uL    MPV 12.8 8.9 - 12.9 FL    Nucleated RBCs 11.9 (H) 0  WBC    nRBC 2.51 (H) 0.00 - 0.01 K/uL   Magnesium    Collection Time: 11/25/23  4:04 AM   Result Value Ref Range    Magnesium 2.7 (H) 1.6 - 2.4 mg/dL   Vancomycin Level, Random    Collection Time: 11/25/23  4:04 AM   Result Value Ref Range    Vancomycin Rm 16.6 UG/ML   Comprehensive Metabolic Panel    Collection Time: 11/25/23  4:04 AM   Result Value Ref Range    Sodium 139 136 - 145 mmol/L    Potassium 3.4 (L) 3.5 - 5.1 mmol/L    Chloride 102 97 - 108 mmol/L    CO2 31 21 - 32 mmol/L    Anion Gap 6 5 - 15 mmol/L    Glucose 125 (H) 65 - 100 mg/dL    BUN 80 (H) 6 - 20 MG/DL    Creatinine 1.89 (H) 0.70 - 1.30 MG/DL    Bun/Cre Ratio 42 (H) 12 - 20      Est, Glom Filt Rate 43 (L) >60 ml/min/1.73m2    Calcium 8.0 (L) 8.5 - 10.1 MG/DL    Total Bilirubin 4.7 (H) 0.2 - 1.0 MG/DL    ALT 51 12 - 78 U/L    AST 75 (H) 15 - 37 U/L    Alk Phosphatase 110 45 - 117 U/L    Total Protein 5.3 (L) 6.4 - 8.2 g/dL    Albumin 1.9 (L) 3.5 - 5.0 g/dL    Globulin 3.4 2.0 - 4.0 g/dL    Albumin/Globulin Ratio 0.6 (L) 1.1 - 2.2     Lactic Acid    Collection Time: 11/25/23  4:04 AM   Result Value Ref Range    Lactic Acid, Plasma 1.2 0.4 - 2.0 MMOL/L   Extra Tubes Hold    Collection Time: 11/25/23  4:04 AM   Result Value Ref Bun/Cre Ratio 49 (H) 12 - 20      Est, Glom Filt Rate 48 (L) >60 ml/min/1.73m2    Calcium 7.9 (L) 8.5 - 10.1 MG/DL   Magnesium    Collection Time: 11/25/23  2:36 PM   Result Value Ref Range    Magnesium 2.6 (H) 1.6 - 2.4 mg/dL   POC Blood Gas & Ionized Calcium    Collection Time: 11/25/23  4:01 PM   Result Value Ref Range    IONIZED CALCIUM 1.13 1.12 - 1.32 mmol/L    POC pH 7.54 (H) 7.35 - 7.45      POC pCO2 37.1 35.0 - 45.0 MMHG    POC PO2 65 (L) 80 - 100 MMHG    POC HCO3 31.8 (H) 22 - 26 MMOL/L    Base Excess 8.6 mmol/L    POC O2 SAT 94.8 92 - 97 %    Site LEFT RADIAL      Specimen type: ARTERIAL     Arterial Blood Gas, POC    Collection Time: 11/25/23  4:20 PM   Result Value Ref Range    POC pH 7.50 (H) 7.35 - 7.45      POC pCO2 40.7 35.0 - 45.0 MMHG    POC PO2 292 (H) 80 - 100 MMHG    POC HCO3 31.8 (H) 22 - 26 MMOL/L    POC O2 SAT 99.9 (H) 92 - 97 %    Base Excess 7.9 mmol/L    Specimen type: ARTERIAL     POCT Glucose    Collection Time: 11/25/23  5:09 PM   Result Value Ref Range    POC Glucose 104 65 - 117 mg/dL    Performed by: Sheri Boland            Micro:     Blood: No positive blood cultures to date      Urine: Urine culture not done     Synovial/Joint fluid:     Sputum/BAL/Pleural: 11/21/23 4+ WBC heavy P, aeruginosa     Peritoneal:      Pathology:      Imaging:    CXR 11/25/2023  Bilateral lung changes and pleural changes stable  No definite changes      Assessment / Plan    VAP Pseudomonas aeruginosa heavy colonization with dual coverage        Continue Merrem, continue tobramycin inhalation        Repeat sputum cultures    2. CT chest when able        Thank you for the opportunity to participate in the care of this patient. Please contact with questions or concerns.       Kendrick Harris MD

## 2023-11-25 NOTE — PROGRESS NOTES
0800 Bedside shift change report given to [address] (oncoming nurse) by Concha Sarah RN (offgoing nurse). Report included the following information Nurse Handoff Report. Assessment completed and ECMO settings noted. ECMO: 4300, Flow 4.6, Sweep 5, FdiO2 100%. 1600 ABG 7.54/40.7/292/31.8/99.9%. ECMO Specialist decreased sweep to 4. TF rate increased to 20ml/hr per NP Karolina. 1720 BP elevated in 180s and RR increased. PRN versed given. Oxygen saturation consistently at 86%. RN and RT suctioned patient via ETT and orally with delio. MD informed- prepare for bedside bronchoscopy. 1745 Bronch performed by Dr Laina Blum- 2mg IV Versed ordered by MD- see MAR. ECMO pump flows briefly increased to 4400 and FiO2 on ventilator increased to 100% during procedure. Thick mucus plug suctioned from L main stem. FiO2 on ventilator increased to 40%. ETT migrated and was readjusted to 24cm at lip by RT- CXR ordered. 1800 CXR at bedside with MD. ETT placement OK. ECMO: 4300, Flow 4.5, Sweep 4, FdiO2 100%. 1930 Bedside shift change report given to [facility] Lisette Culver (oncoming nurse) by Iven Castleman RN (offgoing nurse). Report included the following information Nurse Handoff Report.

## 2023-11-25 NOTE — PROGRESS NOTES
responding to ABX change. Unfortunately, his lungs remain his primary issue and they have not shown much sign of improvement at this time with exception of decreasing Sweep requirements. Continue current treatment regimen, no major changes today.       Signed By: RANJITH Reynoso NP

## 2023-11-25 NOTE — PROGRESS NOTES
2000 - Bedside shift change report given to [address] [facility] [place] (oncoming nurse) by Modesto Smith RN (offgoing nurse). Report included the following information Nurse Handoff Report, Intake/Output, MAR, Recent Results, Cardiac Rhythm NSR, and Alarm Parameters. Pt BUE finger tips, dusky    Vent: AC/PC PS 10, rate 10, FiO2 40%, PEEP 3, Vt 30-50     Gtts: Norepi 8, Vaso 0.04, prop 30, dil 0.7, dex 1.5     ECMO: speed 4300, flows 4.3-4.5, FdiO2 100%, sweep 7 clots in oxygenator. ABG performed. PH 7.54/ CO2 33.3/ Po2 87 HCo3 28.3/ BE -5.4 Sweep decreased to 6.     2100 - ABG PH 7.49/ CO2 37.3/  po2 104 Hco3 28.5/ BE -4.8    2130 - pt became agitated, Resp 30's. Elevated BP. PRN's given. Dil increased to .8    2315 - pt agitated. Prn given. Resp 33. Labored breathing . 0000 -  ABG 7.44/ 44/ 93/ 29.7/ -5.0. Pt - 160's.    0400 -  ABG 7.50/ 41.5/ 81/ 32.6/ 96.8   After Chest xray pt became dyspneic and hypotensive. Prn's given. Pressor requirements increased. H& H 7.3 & 24.1. Potassium 3.4 replace with 20meq x 2. Crea 1.89. Mag 2.7    0700 - Free water flushes adjusted to 150 Q 3 per MD.     Vent: AC/PC PS 10, rate 10, FiO2 40%, PEEP 3, Vt 160-200     Gtts: Norepi 8, Vaso 0.04, prop 30, dil 0.8, dex 1.5     ECMO: speed 4300, flows 4.4-4.6, FdiO2 100%, sweep 6 clots in oxygenator. 9421- Bedside shift change report given to [address] [place] [address] (oncoming nurse) by Yodit Tate RN (offgoing nurse). Report included the following information Nurse Handoff Report, Intake/Output, MAR, Recent Results, Cardiac Rhythm NSR, and Alarm Parameters.

## 2023-11-25 NOTE — PROGRESS NOTES
Pharmacist Note - Vancomycin Dosing  Therapy day 13  Indication: empiric coverage  Current regimen: 1 gram x 1 on 11/24    Recent Labs     11/23/23  1214 11/24/23  0406 11/24/23  1503 11/25/23  0404   WBC 32.5* 30.6*  --  21.1*   CREATININE 2.27* 2.14* 1.95* 1.89*   BUN 84* 85* 84* 80*       A random vancomycin level of 16.6 mcg/mL was obtained ~18.5 hours from previous dose given. Goal target range Trough 10-15 mcg/mL      Plan: Change to 1 gram IV x 1 (at 24 hour ho from previous dose given as level anticipated to be within goal range at that time stamp) . Pharmacy will continue to monitor this patient daily for changes in clinical status and renal function.

## 2023-11-25 NOTE — PROGRESS NOTES
CRITICAL CARE NOTE    Name: Nakia Smith   : 1975   MRN: [id number]   Date: 2023      REASON FOR ICU ADMISSION:  Type A aortic dissection     PRINCIPAL ICU DIAGNOSIS   Type A aortic dissection s/p repair and AVR  Acute hypoxic respiratory failure, VV- ECMO  Shock, presume septic  R pneumothorax w/ persistent airleak  PNA  COPD, bullous emphysema  TEOFILO  HTN  KARINE  Hypernatremia  Afib, post procedure  Hx graves  Bipolar d/o  PTSD  Hx distant PSUD, current smoking    BRIEF PATIENT SUMMARY   48M txfr from OSH for emergent Type A aortic dissection repair and AVR. Patient arrives to CVICU intubated, sedated for routine post-op care. Surgery lasted approximately 12 hours, entered OR ~0400, arrived to CVICU ~1600 hrs. OR course complicated by bleeding, rec'd multiple products, VF episode requiring DCCV x 1. Underwent ECMO cannulation due to hypoxia and L PTX w/ persistent air leak in AM . Weaned off inotropic support. Extubated . O/n - pt w/ persistent significant work of breathing, air leak to R chest tube becoming continuous. Decision made to re-intubate patient, bronched w/ mucus plugging to entire airway. Pt w/ increasing vasopressor requirements 2/2 pseudomonas PNA. Bival stopped due to oral and tracheal bleeding. Improving vasopressor requirements after change to merrem and inh tobramycin     COMPREHENSIVE ASSESSMENT & PLAN:SYSTEM BASED     24 HOUR EVENTS:  Remains on some levophed and vasopressin, otherwise grossly unchanged. NEUROLOGICAL:   Propofol, precedex, dilaudid to goal RASS 0 to -1, -2 if significant WOB  Increased sweep flow to aid w/ drive/ air hunger  BID seroquel  hold clonazepam, resume when able to keep more awake    PULMONOLOGY:   Re-intubated for airway protection, ultra-lung protective settings  Family wishes to defer trach at this time, agreeable if pt shows evidence of improving or plan to transfer to OSH for further procedural management.    Oxygenation, patient. I have discussed the case and the plan and management of the patient's care with the consulting services, the bedside nurses and the respiratory therapist.      NOTE OF PERSONAL INVOLVEMENT IN CARE   This patient has a high probability of imminent, clinically significant deterioration, which requires the highest level of preparedness to intervene urgently. I participated in the decision-making and personally managed or directed the management of the following life and organ supporting interventions that required my frequent assessment to treat or prevent imminent deterioration. I personally spent 35 minutes of critical care time. This is time spent at this critically ill patient's bedside actively involved in patient care as well as the coordination of care. This does not include any procedural time which has been billed separately.     Kashif Holland MD   Critical Care Medicine  [facility]

## 2023-11-25 NOTE — PROCEDURES
[facility]  Bronchoscopy Procedure Note    Procedure: Therapeutic bronchoscopy. Diagnosis:  Acute hypoxemic respiratory failure    Indication:  Mucus Plugging     Anesthesia:   Patient on ventilator and receiving  Propofol drip versed     The following parameters were monitored: oxygen saturation, heart rate, blood pressure, respiratory rate, EKG, adequacy of pulmonary ventilation and response to care. Procedure Details:   -- The bronchoscope was introduced through an endotracheal tube. Thin bloody secretions noted. ETT approx 2cm from gabrielle. L main stem w/ thick mucus plug which was irrigated and suctioned. RUL w/ minimal secretions, remainder of bronchial tree with moderate amounts of thick mucoid secretions which were likewise cleared.      Specimens: none      Complications: none    Estimated Blood Loss: Minimal    Lionel Carrillo MD   Staff [address]

## 2023-11-26 NOTE — PROGRESS NOTES
1930 - Bedside shift change report given to [address] [facility] [place] (oncoming nurse) by Nupur Rendon RN (offgoing nurse). Report included the following information Nurse Handoff Report. Assessment completed     ECMO: 4300, Flow 4.5, Sweep 4, FdiO2 100%. clots in oxygenator. Vent: AC/PC PS 10, rate 10, FiO2 40%, PEEP 3, vt 200-250    Gtts: Norepi 5 Vaso 0.04, prop 30, dil 0.8, dex 1.5    2000 - ABG 7.48/43.2/64/32.1/93.4    0000 - ABG 7.44/50.2/79/34/96    0400 -  Abg  7.46/49/76/35/96  K 3.4. 40 meq given iv, crea 1.56 H & H 7.2 &  24.4     Delta p 111    0500 - Art Line Replaced      Gtts: Norepi 3 Vaso 0.04, prop 30, dil 0.8, dex 1.5.     0600 - EKG performed. 1 Avb      0730 - Bedside shift change report given to [address] (oncoming nurse) by Diony Barraza RN (offgoing nurse). Report included the following information Nurse Handoff Report, Intake/Output, MAR, Recent Results, Cardiac Rhythm NSR, and Alarm Parameters. ECMO: 4300, Flow 4.4 -4.6 , Sweep 4, FdiO2 100%. clots in oxygenator.

## 2023-11-26 NOTE — PROGRESS NOTES
CRITICAL CARE NOTE    Name: Cory Zhong   : 1975   MRN: [id number]   Date: 2023      REASON FOR ICU ADMISSION:  Type A aortic dissection     PRINCIPAL ICU DIAGNOSIS   Type A aortic dissection s/p repair and AVR  Acute hypoxic respiratory failure, VV- ECMO  Shock, presume septic  R pneumothorax w/ persistent airleak  PNA  COPD, bullous emphysema  TEOFILO  HTN  KARINE  Hypernatremia  Afib, post procedure  Hx graves  Bipolar d/o  PTSD  Hx distant PSUD, current smoking    BRIEF PATIENT SUMMARY   48M txfr from OSH for emergent Type A aortic dissection repair and AVR. Patient arrives to CVICU intubated, sedated for routine post-op care. Surgery lasted approximately 12 hours, entered OR ~0400, arrived to CVICU ~1600 hrs. OR course complicated by bleeding, rec'd multiple products, VF episode requiring DCCV x 1. Underwent ECMO cannulation due to hypoxia and L PTX w/ persistent air leak in AM . Weaned off inotropic support. Extubated . O/n - pt w/ persistent significant work of breathing, air leak to R chest tube becoming continuous. Decision made to re-intubate patient, bronched w/ mucus plugging to entire airway. Pt w/ increasing vasopressor requirements 2/2 pseudomonas PNA. Bival stopped due to oral and tracheal bleeding. Improving vasopressor requirements after change to merrem and inh tobramycin     COMPREHENSIVE ASSESSMENT & PLAN:SYSTEM BASED     24 HOUR EVENTS:  Continued decreased vasopressor requirement. Bronch yesterday w/ thick mucoid secretions, however improved from prior. TV remain transientyl between  w/ persistent air leak on R/med chest tubes.      NEUROLOGICAL:   Propofol, precedex, dilaudid to goal RASS 0 to -1, -2 if significant WOB  Increased sweep flow to aid w/ drive/ air hunger  BID seroquel  hold clonazepam, resume when able to keep more awake    PULMONOLOGY:   Re-intubated for airway protection, ultra-lung protective settings  Family wishes to defer trach at this

## 2023-11-26 NOTE — PROCEDURES
Patient has not used pacing wires in last 48 hours, HR stable. Patient A and V wires discontinued via cutting of wires. Skin cleaned with chlorohexidine swab, pressure applied to pull wires taut and skin pushed down using scissors. Wires cut, skin released back over remaining wire ends. Patient tolerated well, hemodynamically stable at end of procedure.      Francesca Ramirez, QUIANAP-BC

## 2023-11-26 NOTE — PROGRESS NOTES
Physical Therapy  11/26/2023    Remains inappropriate for PT evaluation due to sedation. Note decreased pressor requirements. Will continue to follow peripherally. Thank you.     Irma Lopez, PT, DPT

## 2023-11-26 NOTE — PROGRESS NOTES
0800 Bedside shift change report given to [address] (oncoming nurse) by Abebe Canas RN (offgoing nurse). Report included the following information Nurse Handoff Report. Assessment completed and ECMO settings noted. ECMO: 4300, flow 4.58, sweep 4, FdiO2 100%. Cardiac surgery rounding- respiratory culture ordered. Plan to transition patient from IV to PO amio. 0805 ABG 7.5/43/84/33.2/97% - Sweep decreased to 3.5 by ECMO Specialist.     1000 Amio gtt STOPPED per FISH Turcios. AV wires cut by NP.    ECMO: 4300, flow 4.65, sweep 3.5, FdiO2 100%. 1930 Bedside shift change report given to [address] [facility] Lisette Culver (oncoming nurse) by Rome Jason RN (offgoing nurse). Report included the following information Nurse Handoff Report.

## 2023-11-26 NOTE — PROGRESS NOTES
[facility]   [address], [address] [facility], [address] [facility]  Phone: [phone]   [phone]    [email or web address]     Nephrology Progress Note    Patient Name : Brissa Sharma      : 1975     MRN : [id number]  Date of Admission : 11/10/2023  Date of Servive : 23    CC: Follow up for KARINE       Assessment and Plan   KARINE:  - from post op ATN  - stable renal fx w/ ongoing diuresis   - Bumex reduced to 1mg Q8hr, one dose of Diuril today   - no emergent need for RRT     Hypernatremia:  - reduced FW flushes to 150 cc Q3hr  - diuretics as above   - serial labs      Type A aortic dissection s/p repair and AVR on 11/10     RV failure  Hypoxia, resp failure:  - VV ECMO   - per CCM/CTS service  - re-intubated      Afib     Post op anemia:  - monitor and tx as needed     HTN:  - BP goals per CTS     Hx of Graves'  Bipolar d/o  Hx of drug abse  PTSD     Interval History:  Seen and examined. Remains ECMO dependent. Adequate diuresis, on vaso and levophed     Review of Systems: Review of systems not obtained due to patient factors.     Current Medications:   Current Facility-Administered Medications   Medication Dose Route Frequency    bumetanide (BUMEX) injection 2 mg  2 mg IntraVENous Q8H    meropenem (MERREM) 1,000 mg in sodium chloride 0.9 % 100 mL IVPB (mini-bag)  1,000 mg IntraVENous Q8H    lubrifresh P.M. (artificial tears) ophthalmic ointment   Both Eyes PRN    micafungin (MYCAMINE) 150 mg in sodium chloride 0.9 % 100 mL IVPB  150 mg IntraVENous Q24H    0.9 % sodium chloride infusion   IntraVENous PRN    QUEtiapine (SEROQUEL) tablet 50 mg  50 mg Oral BID    tobramycin (PF) (RAQUEL) nebulizer solution 300 mg  300 mg Nebulization BID RT    balsum peru-castor oil (VENELEX) ointment   Topical BID    insulin NPH (HumuLIN N;NovoLIN N) injection vial 16 Units  16 Units SubCUTAneous Q12H    propofol infusion  5-50 mcg/kg/min IntraVENous Continuous    HYDROmorphone

## 2023-11-26 NOTE — PROCEDURES
Procedure Note - Arterial Venous Access:   Performed by Jose Wiggins MD     Diagnosis: Shock  Insertion Date: 11/26/23  Time:5:52 AM   Obtained Consent? N/A; emergent   Procedure Location:  CVICU. Immediately prior to the procedure, the patient was reevaluated and found suitable for the planned procedure and any planned medications. Immediately prior to the procedure a time out was called to verify the correct patient, procedure, equipment, staff, and marking as appropriate. Neal test to check for collateral flow. Line Bundle:  Full sterile barrier precautions used. 5 mL 1% Lidocaine placed at insertion site. The site was prepped with ChloraPrep. Catheter inserted into a new site. Using Seldinger technique a arterial catheter was placed in the Righ Radial with 1 number of attempts for hemodynamic monitoring. Ultrasound Guidance was not utilized. There was good red pulsatile blood return with waveform on monitor. Femoral Site? N/A. If Yes, reason femoral site was chosen:   Catheter secured. Biopatch/CHG bio-occlusive dressing in place? yes. Complications encountered? no. The procedure was tolerated well.     Jose Wiggins MD  [address] [facility] [address]

## 2023-11-26 NOTE — PROGRESS NOTES
Pharmacist Note - Vancomycin Dosing  Therapy day 14  Indication: empiric coverage  Current regimen: Dosing by levels- last received 1 gram on 11/25    Recent Labs     11/24/23  0406 11/24/23  1503 11/25/23  0404 11/25/23  1436 11/26/23  0413   WBC 30.6*  --  21.1*  --  16.4*   CREATININE 2.14*   < > 1.89* 1.71* 1.56*   BUN 85*   < > 80* 83* 78*    < > = values in this interval not displayed. A random vancomycin level of 13.7 mcg/mL was obtained ~24 hours from previous dose given. Goal target range Trough 10-15 mcg/mL      Plan: Change to 1 gram IV x 1 . Pharmacy will continue to monitor this patient daily for changes in clinical status and renal function.

## 2023-11-26 NOTE — PROCEDURES
ECMO Management Note Day #13    Cannula: 31 Fr Dual lumen Bi-Caval Clio cannula in RIJ (3cm insertion to end of silver rings), Placed by Dr. Olga Chapin    Mode: V-V    Sweep: 4L , FdO2 100%    Flow: 4.6 L @ 4300 RPM's    Anticoagulation: Bival, held     Circuit Inspection: Circuit inspected  w/o visible clot or air in circuit, connections zip tied, pump-head well seated. Euroset oxygenator some increased in visible segments clot in de-airing line, otherwise free of visible clot or air, attached to heater/cooler. Increasing oxygenator delta, off AC for 6 days, monitor post gasses to see if he requires oxygenator change. ECMO Run Significant Events:  - 11/13: Pt cannulated for V-V ECMO  - 11/14 Extubated  - 11/15 Cannula placement adjusted under POCUS, pulled back 2cm 2/2 recirculation and return jet in IVC. - 11/21: recurrent continuous air leak, worsening pt respiratory effort, re-intubated  - 11/22 Bival held due to tracheal and  oropharynx bleeding    Past 24hrs  - No acute changes from ECLS management standpoint today    Assessment/Plan:  - Pt cannulated due to hypoxemia and bilateral PTX w/ marked air leak for full lung rest   - off anticoagulation  - Continue current support  - Sweep to a degree of hypocapnia to decrease drive  - Discussed w/ RN and ECMO specialist at bedside    - See full daily note for further plan    Apolinar Hodgson MD  Staff [address]

## 2023-11-27 NOTE — DIABETES MGMT
[address] [facility]  DIABETES MANAGEMENT CONSULT    Consulted by Provider for advanced nursing evaluation and care for inpatient blood glucose management. Evaluation and Action Plan   This 46year old AA male was transferred from [facility] 11/9/23 to [facility] when AA noted for emergent intervention. Patient underwent repair of AA 11/10/23. Recovering in ICU. ECMO placement 11/12/23. Had been intubated, sedated and on vent support, along with multiple drugs to support rhythm and BP 11/13/23; extubated 11/14/23 but re-intubated 11/21/23 (1130am) due to air hunger and agitation. Continues to be sedated on Propofol & Precedex infusions. On vasopressin & levo for BP support. Versed & Dilaudid infusions+. TF stopped for a couple days but has been back in place & slowly advanced. Hypernatremia resolved 11/24/23. As for BG management, patient did not have diabetes PTA. Insulin needs trended down from a high of 18 units/hr to <3 units/hr at the time of transition off insulin infusion 11/12/23; given 15 units of Lantus insulin. Was on corrective insulin until a small dose of basal insulin was added 11/13/23. Tube feedings have been added. Insulin dosing for both basal & nutritional needs has been effective.     Management Rationale Action Plan   Medication   Basal needs Based on  [x]        Blood glucose pattern  []        Weight & BMI  [x]        Kidney dysfunction  []        Home diabetes regimen     Continue NPH insulin to 16 units twice daily   Nutritional needs Based on  []        Blood glucose pattern  []        CHO load  [x]        Enteral feeding CHO load  []        TPN/PPN dextrose load     TF running at goal rate of 40 cc/hr  (180 grams of CHO/D)    Corrective insulin Based on sensitivity  [x]        Low   []        Medium  []        High      Additional orders   BG monitoring Q6 hrs          Initial Presentation   Ja Gold is a 46 y.o. male transferred 11/9/23 from [facility] ER after experiencing

## 2023-11-27 NOTE — CARE COORDINATION
Signed       update                                                 Transition of Care Plan:        RUR: 18% - moderate  Prior Level of Functioning: independent  Disposition: IPR pending medical stability  Follow up appointments: Cardiac Surgery  DME needed: none  Transportation at discharge: TBD  Caregiver Contact: father - Dago De La Cruz - p: [phone]   Discharge Caregiver contacted prior to discharge? planned  Care Conference needed? no  Barriers to discharge: medical     Chart reviewed. Patient remains critically ill. Re-intubated on VV ECMO. Patient may have tracheostomy placed soon. CM will continue to remotely follow for clinical improvement. Update-11/27/23  Patient remains on ECMO and was re=intubated on 11/23. Of note patient is uninsured. This CM met with patient's father Dago De La Cruz. CM sent e-mail to [employer] to complete financial screening for Medicaid. Father is NOK. Father and step-mom live in [place]. CM will follow up with [employer] regarding screening if completed.

## 2023-11-27 NOTE — PROGRESS NOTES
[facility]   [address], [address] [facility], [address] [facility]  Phone: [phone]   [phone]    [email or web address]     Nephrology Progress Note    Patient Name : Laurel Davidson      : 1975     MRN : [id number]  Date of Admission : 11/10/2023  Date of Servive : 23    CC: Follow up for KARINE       Assessment and Plan   KARINE:  - from post op ATN  - stable renal fx w/ ongoing diuresis   - cont current diuretics  - no emergent need for RRT   - daily labs and I/Os    Hypernatremia:  - reduced FW flushes to 150 cc Q3hr  - serial labs      Type A aortic dissection s/p repair and AVR on 11/10     RV failure  Hypoxia, resp failure:  - VV ECMO   - per [facility]/CTS service  - re-intubated      Afib     Post op anemia:  - monitor and tx as needed     HTN:  - BP goals per CTS     Hx of Graves'  Bipolar d/o  Hx of drug abse  PTSD     Interval History:  Seen and examined. Remains ECMO dependent. Adequate diuresis. Cr stable, stable UOP. Review of Systems: Review of systems not obtained due to patient factors.     Current Medications:   Current Facility-Administered Medications   Medication Dose Route Frequency    heparin (porcine) injection 5,000 Units  5,000 Units SubCUTAneous 3 times per day    vancomycin (VANCOCIN) 1,250 mg in sodium chloride 0.9 % 250 mL IVPB ([id number])  1,250 mg IntraVENous Q24H    [START ON 2023] vancomycin level  @ 0600  1 each Other Once    bumetanide (BUMEX) injection 1 mg  1 mg IntraVENous Q8H    meropenem (MERREM) 1,000 mg in sodium chloride 0.9 % 100 mL IVPB (mini-bag)  1,000 mg IntraVENous Q8H    lubrifresh P.M. (artificial tears) ophthalmic ointment   Both Eyes PRN    micafungin (MYCAMINE) 150 mg in sodium chloride 0.9 % 100 mL IVPB  150 mg IntraVENous Q24H    0.9 % sodium chloride infusion   IntraVENous PRN    QUEtiapine (SEROQUEL) tablet 50 mg  50 mg Oral BID    tobramycin (PF) (RAQUEL) nebulizer solution 300 mg  300 mg

## 2023-11-27 NOTE — PROGRESS NOTES
Referral source:   [facility] at [facility] in [facility] [place] [facility] CV INTNSV CARE. I reviewed the medical record prior to this encounter. Spiritual Assessment: Follow up visit - rounding on unit. PT Mom Brianna at bedside    Ms Grant Dugan and I current updated,and how family is doing. Dada Gillespie is still intubated and unable to communictate. Family are praying and hoping for the best. No need noted. Outcome:Brianna verbally expressed appreciation for today's visit and support. Plan of Care:  advised [place] family members of continued spiritual support as needed. [address] [facility], [address] [place] [address] paging Service [phone] ([phone])

## 2023-11-27 NOTE — PROGRESS NOTES
Physical Therapy  11/27/2023    Chart reviewed. Patient continues with sedation that prevents participation with therapy at this time. Currently unable to have sedation lightened due to air hunger. Will sign off at this time but please reconsult when more awake and stable to participate in therapy. Thank you.     Irma Watts, PT, DPT

## 2023-11-27 NOTE — PROCEDURES
ECMO Management Note Day #14    Cannula: 31 Fr Dual lumen Bi-Caval [place] cannula in RIJ (3cm insertion to end of silver rings), Placed by Dr. Noemi Geiger    Mode: V-V    Sweep: 3L , FdO2 100%    Flow: 4.7 L @ 4300 RPM's    Anticoagulation: Bival, held     Circuit Inspection: Circuit inspected  w/o visible clot or air in circuit, connections zip tied, pump-head well seated. Euroset oxygenator some increased in visible segments clot in de-airing line, otherwise free of visible clot or air, attached to heater/cooler. Increasing oxygenator delta, off AC for 7 days, monitor post gasses to see if he requires oxygenator change. Post Ox Pa02 256 today    ECMO Run Significant Events:  - 11/13: Pt cannulated for V-V ECMO  - 11/14 Extubated  - 11/15 Cannula placement adjusted under POCUS, pulled back 2cm 2/2 recirculation and return jet in IVC. - 11/21: recurrent continuous air leak, worsening pt respiratory effort, re-intubated  - 11/22 Bival held due to tracheal and  oropharynx bleeding    Past 24hrs  - No acute changes from ECLS management standpoint today    Assessment/Plan:  - Pt cannulated due to hypoxemia and bilateral PTX w/ marked air leak for full lung rest   - off anticoagulation  - Continue current support  - Sweep to a degree of hypocapnia to decrease drive  - Discussed w/ RN and ECMO specialist at bedside    - See full daily note for further plan    Apolinar Garcia MD  Staff [address] [employer]

## 2023-11-27 NOTE — PROGRESS NOTES
Occupational Therapy    Chart reviewed. Patient continues with sedation that prevents participation with therapy at this time. Currently unable to have sedation lightened due to air hunger. Will sign off at this time but please reconsult when more awake and stable to participate in therapy.      Fred Childs MS, OTR/L

## 2023-11-27 NOTE — PROGRESS NOTES
1930 - Bedside shift change report given to [address] (oncoming nurse) by Phillip Curry RN (offgoing nurse). Report included the following information Nurse Handoff Report. Assessment completed     ECMO: 4300, flow 4.65, sweep 3.5, FdiO2 100% multiple clots in oxygenator. 2000: ABG 7.51/45.7/78/36.8    2005: sweep decreased to 3 by Ecmo Specialist     0000: ABG 7.43/54.9/66/37/93    0400: ABG 7.44/51/58/34.7/90    H&H 7&24. 3. PLT 81     0700 ECMO: 4300, flow 4.7, sweep 3.0, FdiO2 100% multiple clots in oxygenator. 0745 - Bedside shift change report given to [address] (oncoming nurse) by Dolores Howard RN (offgoing nurse). Report included the following information Nurse Handoff Report, Intake/Output, MAR, Recent Results, Cardiac Rhythm NSR, and Alarm Parameters.

## 2023-11-27 NOTE — PROGRESS NOTES
[facility] Plan of Care Note    Pt w/ improvement of septic shock 2/2 pseudomonas PNA, however w/ persistent air leak from R chest tube consistent w/ BPF and continuing to require V-V ECMO support for hypoxemic respiratory failure. Pt discussed w/ [facility] thoracic surgery with acceptance for evaluation for EBVs or other intervention for BPF. After extensive discussion w/ pt's family by myself and Dr. Rosalva Brown, pt's family w/ decision to decline transfer to [address] [place] [address], and defer any further procedural interventions. They state that at this time they wish to focus on pt's comfort, and would like to pursue comfort measures only with withdrawal of ECMO support and palliative extubation once more family is able to attend at bedside in the coming days. Pt made a DNR at this time. Apolinar Blum MD  Staff [address] [facility]

## 2023-11-28 NOTE — PROGRESS NOTES
0800- Bedside report received. ECMO: 4400 RPM/ 4.8 LPM/ Sweep 4, FiO2 100    VENT: AC/ PC 10/10, fdO2- 40, PEEP- 6    DRIPS: On./ Off Levo/ Vaso, Dilaudid 1mg/hr, Precedex 1.5mcg/ kg/hr, Diprivan 35mcg/kg/min    1130- [employer] notified of possible withdrawl in the next week. 1700- Flexiseal balloon deflated to assess- 35cc noted. Reinflated and irrigated with 30cc of water. 2000- Bedside and Verbal shift change report given to [address] (oncoming nurse) by Jackie Hammond RN (offgoing nurse). Report given with SBAR, Kardex, Intake/Output and MAR.

## 2023-11-28 NOTE — DIABETES MGMT
desired  11/21/23 Dextrose off. Na still in the 150s.   11/27/23 Bgs steady in the 120-170s  11/28/23     Assessment and Nursing Intervention   Nursing Diagnosis Risk for unstable blood glucose pattern   Nursing Intervention Domain 6827 Decision-making Support   Nursing Interventions Examined current inpatient diabetes/blood glucose control   Explored factors facilitating and impeding inpatient management  Explored corrective strategies with patient and responsible inpatient provider   Informed patient of rational for insulin strategy while hospitalized     Billing Code(s)   [] 95593 IP subsequent hospital care - 50 minutes   [x] 56803 IP subsequent hospital care - 35 minutes   [] [phone] IP subsequent hospital care - 25 minutes   [] [id number] IP initial hospital care - 40 minutes     Before making these care recommendations, I personally reviewed the hospitalization record, including notes, laboratory & diagnostic data and current medications, and examined the patient at the bedside (circumstances permitting) before determining care. More than fifty (50) percent of the time was spent in patient counseling and/or care coordination.   Total minutes: [facility], APRN - [facility]  Clinical Nurse Specialist - Diabetes & endocrine disorders  [facility]  Access via Confer

## 2023-11-28 NOTE — PROGRESS NOTES
CSS FLOOR Progress Note    Admit Date: 11/10/2023    Procedure:    11/13/2023 with Dr. Donell Cormier, ASHLEIGH BY DR Georgette East    11/10/2023 with Dr. Olga Chapin:  RIGHT AXILLARY CUTDOWN [address]; AVR, ROOT AND ASCENGING AORTIC ANEURYSM REPAIR WITH 29MM KONECT RESILIA  AORTIC VALVED CONDUIT; ECC; CIRCULATORY ARREST; ASHLEIGH & EPIAORTIC US BY DR. Kenji Marsh & DR. CANTU [facility] Synopsis:  POD0 11/10: Ascending aortic aneurysm repair and AVR with aortic valved conduit. Vfib arrest requiring cardioversion and 20 sec of CPR. Transferred to CVICU on Pneylephrine, Amiodarone, Vasopressin, Levophed, precedex and insulin. LVEF reported as Low normal, with dyskinetic qny-qqypfjncquqaf-tiekzumwkgom wall motion in post op ASHLEIGH. Albumenx4, FFP, platelets, 1L LR. Neuro check moved all extremities, nodded appropriately. Wean levo, Vaso and Epi added  POD1 11/11:  Febrile. On Epi. FiO2 60% increased to 80%, ACVC PEEP 10. Nicardipine for SBP ,120. PO amio and lipitor held for elevated liver enzymes. Albuminx2. Tylenol for fever. POD2 11/12: PEEP increased to 12, FiO2 to 70% by intensivist. ET suctioned thick secretions. Pt spontaneously awakening with increasing sedation requirements. Frequent desaturations overnight. Tmax 102. On Epi with plans to transition to dobutamine. POD3 11/13: Increasing ventilation and sedation requirements. FiO2 100%. PEEP of 8 for bollous emphysema. Off Epi. Dobut 2.5 mcg. Brought to OR by Dr. Olga Chapin for placement of VV ECMO. Post op ASHLEIGH LVEF 55% with normal global function. SBP goal 120. Transfuse pltlts. POD4 11/14: Extubated. POD5 11/15: [place] catheter repositioned under ultrasound. Discussed with [facility]. Not lung transplant candidate 10 years ago. Open to eval but unlikely. Possibly discuss with [facility] for transplant if unable to bridge to recovery  POD6 11/16: Increased diureses to Lasix 60 mg IV BID. Adding Metolazone for diurese/hypernatremia. On D50 38.5 mEq. Incision clean, dry and intact   Heart:   Regular rate and rhythm and no murmurs, rubs or gallops   Abdomen:    Mildly distended, no overt tenderness; TF at 60mL/hf today; FMS in place, increased output with resumption of TF   Extremities:  2+ pitting edema. Weak pulses on the L, with LLE mottling; finger tips with discoloration and weeping, L hand worse than right    Neurologic:  Withdraws per nursing       Lab Data Reviewed:   Recent Labs     11/28/23  0404   WBC 13.9*   HGB 7.5*   HCT 25.3*   PLT 85*   *   K 4.0   BUN 76*   CTA 11/13/23: IMPRESSION:  1. Status post aortic valve and ascending aortic arch repair. 2.  The thoracoabdominal dissection now originates at the brachiocephalic artery  just distal to the repair. It extends to the right common and left external  iliac arteries. The aortic branch vessels are all supplied by the true lumen. The dissection minimally extends into the brachiocephalic and left renal  arteries. This results in mild stenosis of the proximal left renal artery. 3.  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 Otherwise, the  there is normal three-vessel right lower extremity runoff. 4.  On the left, the dissection extends into the external iliac artery and  results in moderate proximal stenosis of the left common iliac artery. Otherwise, there is normal three-vessel left lower extremity runoff. 5.  Consolidation and volume loss of the right lateral middle lobe, entire right  lower lobe, and nearly the entire left lower lobes. This likely reflects  atelectasis/collapse, although superimposed pneumonia or aspiration difficult to  exclude. Severe emphysema of the residual aerated lung.      6.  Postoperative support hardware including ECMO cannula, left internal jugular  venous catheter, endotracheal tube, enteric tube, two mediastinal drains,  bilateral chest tubes,

## 2023-11-28 NOTE — PROGRESS NOTES
Comprehensive Nutrition Assessment    Type and Reason for Visit: Reassess    Nutrition Recommendations/Plan:     Continue TF of Vital 1.5 @ 55 mL/hr with 1 packet Prosource BID    Flushes per nephrology (currently 150 mL H2o q 3 hours)         Malnutrition Assessment:  Malnutrition Status:  Insufficient data (11/14/23 1027)         Nutrition Assessment:    PMHx includes COPD, graves disease, PTSD  Pt admitted w an acute type a dissection for emergent surgical intervention. S/p emergent AVR, Ao Root/aneurysm repair/replace 11/10. Post op course c/b COPD w/ bullous emphysema, bl PTX  Placed on VV ECMO 11/13.      11/28: pt remains intubated, on ECMO, currently receiving TF at goal.  Noted likely plans for withdrawal of care/ transition to [facility] on Saturday. FMS placed 11/19. Propofol needs increasing over past few days. Currently 23 mL/hr providing 607 kcal.  TF of Vital 1.5 @ 55 mL/hr with 2 packets Prosource daily provides 1210 mL, 1975 kcal, 122 gm pro, and 226 gm CHO, and 920+436=1230 mL free H2O (not including flushes)  With propofol, pt receiving 2582 kcal which is ~33 kcal/kg UBW. Given current POC, insulin has been adjusted for current TF regimen, pt is tolerating current TF, and appears to be losing muscle mass and SQ fat stores, will keep as is for now. Flushes decreased over past week and Na+ mildly elevated again today. 11/21: noted Continued tachypnea/air hunger, pt re-intubated this morning, likely plan for trach and if pt does make improvements over next 2 weeks, should discuss terminal extubation. Nephrology stopping D5 and flushes increased to 200 mL H2o q 2 hours this morning. Otherwise, has been receiving/ tolerating TF at goal.  FMS placed 11/19, now with watery brown BMs. Bowel regimen d/c'd. Weight trending back down towards UBW.   Propofol @ 10.2 mL/hr providing 269 kcal.  Vital 1.5 @ 60 mL/hr with 1 packet Prosource daily and flushes of 200 mL h2o q 2 hours provides 1320 mL, 2060

## 2023-11-28 NOTE — PROGRESS NOTES
withdrawal of ECMO support and palliative extubation once more family is able to attend at bedside in the coming days. COMPREHENSIVE ASSESSMENT & PLAN:SYSTEM BASED     24 HOUR EVENTS:  No acute changes, discussed w/ family at bedside. Plan for  full transition to [facility] this Saturday. They stated understanding that at this point we would not exchange the membrane lung in event of failure. 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    Diuresis to slight negative as tolerated  Scheduled nebs  PNA coverage as bellow  Pulmonary hygiene    CARDIOVASCULAR:   Levophed, vaso gtt wean  as tolerated  MAP goal >65  PRN cardene for SBP <150  amiodarone    GASTROINTESTINAL   TF, FWF  PPI  Bowel regimen     RENAL/ELECTROLYTE/FLUIDS:   Strict I/Os  Monitor Na  Nephrology following  Renally dose medications  RFP  Mg/K/Phos->2/3/4    ENDOCRINE:   Goal euglycemia  Unclear if pt remains on methimazole for Graves' disease    HEMATOLOGY/ONCOLOGY:   Heparin ppx  CBC    INFECTIOUS DISEASE:   BAL Cx 11/21, heavy pseudomonas  Bcx NGTD  ID following    C/w Vanc, merrem, inh tobra, mateo    Prior cefepime, zosyn, flagyl    ICU DAILY CHECKLIST   Code Status:DNR  DVT Prophylaxis: SCDs, heparin  T/L/D: ETT, miles CVC, Cassandra, ECMO cannula  SUP: PPI  Diet: TF  Activity Level:ad  donnie  ABCDEF Bundle/Checklist Completed:Yes  Disposition: ICU  Multidisciplinary Rounds Completed: yes  Goals of Care Discussion/Palliative: yes  Patient/Family Updated: yes      [address]   11/10 admitted to CVICU post emergent aortic repair and AVR  11/11 - adequate UOP, wakes agitated, +/- redirectable, follows commands. 11/12 - CI improved, remains on Epi, Dobuta, Cardene, good UOP. Desats occaisonally, CXR stable  11/13 ECMO cannulation due to hypoxia and L PTX w/ persistent air leak   11/14 Extubated  11/15 Cannula adjusted, 1u pRBC  11/21 re-intubated  11/22 bival stopped due to bleeding from trachea and oropharynx  11/23 Abx adjusted given pseudomonas PNA  11/27 Accepted to [facility] for EBV eval, however family wishes to transition to [facility]. Made DNR    SUBJECTIVE   Review of Systems   Unable to obtain due to pt factors    OBJECTIVE   Physical Exam  Vitals and nursing note reviewed. Constitutional:       Appearance: He is ill-appearing. Comments: Intubated, sedated   HENT:      Head: Normocephalic and atraumatic. Nose: Nose normal. No congestion. Mouth/Throat:      Mouth: Mucous membranes are moist.      Pharynx: Oropharynx is clear. No oropharyngeal exudate. Eyes:      General: No scleral icterus. Extraocular Movements: Extraocular movements intact. Conjunctiva/sclera: Conjunctivae normal.      Pupils: Pupils are equal, round, and reactive to light. Neck:      Comments: RIJ dual lumen EMCO cannula  Cardiovascular:      Rate and Rhythm: Normal rate and regular rhythm. Pulses: Normal pulses. Heart sounds: Normal heart sounds. Comments: Mediastinal drains x2  Pulmonary:      Comments: Bilateral chest tubes, continuous leak. Abdominal:      General: Abdomen is flat. Bowel sounds are normal. There is no distension. Palpations: Abdomen is soft. Musculoskeletal:         General: Normal range of motion. Right lower leg: No edema. Left lower leg: No edema. Skin:     Capillary Refill: Capillary refill takes less than 2 seconds. Findings: Bruising present. Comments: Bilateral finger and BL feet w/ discoloration   Neurological:      General: No focal deficit present. Mental Status: He is alert.       Cranial

## 2023-11-28 NOTE — PROGRESS NOTES
Pharmacist Note - Vancomycin Dosing  Therapy day 16  Indication: sepsis  Current regimen: 1250 mg IV Q24H    Recent Labs     11/26/23  0413 11/26/23  1426 11/27/23  0427 11/27/23  1451 11/28/23  0404   WBC 16.4*  --  17.0*  --  13.9*   CREATININE 1.56*   < > 1.48* 1.23 1.19   BUN 78*   < > 82* 82* 76*    < > = values in this interval not displayed. A random vancomycin level of 18.2 mcg/mL was obtained and from this level, the patient's AUC24 is calculated to be 386 with the current regimen. Goal target range AUC/PAVEL 400-600      Plan: Patient's renal function continues to improve. Change to 750 mg IV Q12H . Pharmacy will continue to monitor this patient daily for changes in clinical status and renal function. *Random vancomycin levels are used to calculate AUC/PAVEL, this level should not be interpreted as a trough. Vancomycin has been dosed using Bayesian kinetics software to target an AUC24:PAVEL of 400-600, which provides adequate exposure for as assumed infection due to MRSA with an PAVEL of 1 or less while reducing the risk of nephrotoxicity as seen with traditional trough based dosing goals.

## 2023-11-28 NOTE — PLAN OF CARE
1945: Bedside and Verbal shift change report given to SAMI Gusman (oncoming nurse) by [address] [facility] [address], RN (offgoing nurse). Report included the following information Nurse Handoff Report. 2000: ECMO settings:  pump flow: 4.63, pump speed: 4300, Sweep: 3, and FdO2: 100%  Current drips: precedex: 1.5mcg/kg/hr, dilaudid: 1mg/hr, levoophed: 4mcg/min, propofol: 35 mcg/min, vaso: 0.04 units/min    2015: ABG results: pH: 7.5 / CO2: 45.7 / pO2: 67 / HCO3: 35.5 /  O2: 94.3%   No changes to ECMO circuit    Repeat H/H drawn. Hgb: 7.7 / Hct: 25.8    0000: ABG results: pH:  7.46 / CO2: 46.6 / pO2: 61 / HCO3: 33.2 / O2: 92.1. No changes to ECMO circuit at this time    0400: Morning labs completed. 0600: Chg bath completed. Patient tolerated fairly well.     2000: Bedside and Verbal shift change report given to Shamar Deal RN (oncoming nurse) by SAMI Gusman (offgoing nurse). Report included the following information Nurse Handoff Report.      Problem: Pain  Goal: Verbalizes/displays adequate comfort level or baseline comfort level  Outcome: Progressing  Flowsheets (Taken 11/27/2023 2228)  Verbalizes/displays adequate comfort level or baseline comfort level:   Assess pain using appropriate pain scale   Administer analgesics based on type and severity of pain and evaluate response   Implement non-pharmacological measures as appropriate and evaluate response   Notify Licensed Independent Practitioner if interventions unsuccessful or patient reports new pain     Problem: Safety - Adult  Goal: Free from fall injury  Outcome: Progressing     Problem: Nutrition Deficit:  Goal: Optimize nutritional status  Outcome: Progressing  Flowsheets (Taken 11/27/2023 2228)  Nutrient intake appropriate for improving, restoring, or maintaining nutritional needs:   Assess nutritional status and recommend course of action   Recommend, monitor, and adjust tube feedings and TPN/PPN based on assessed needs

## 2023-11-28 NOTE — PROCEDURES
ECMO Management Note Day #15    Cannula: 31 Fr Dual lumen Bi-[place]aval [place] cannula in RIJ (3cm insertion to end of silver rings), Placed by Dr. Olga Chapin    Mode: V-V    Sweep: 4L , FdO2 100%    Flow: 4.8 L @ 4400 RPM's    Anticoagulation: Bival, held     Circuit Inspection: Circuit inspected, connections zip tied, pump-head well seated. Euroset oxygenator some increased clot in visible segments of  venous side, otherwise free of visible clot or air, attached to heater/cooler. ECMO Run Significant Events:  - 11/13: Pt cannulated for V-V ECMO  - 11/14 Extubated  - 11/15 Cannula placement adjusted under POCUS, pulled back 2cm 2/2 recirculation and return jet in IVC. - 11/21: recurrent continuous air leak, worsening pt respiratory effort, re-intubated  - 11/22 Bival held due to tracheal and  oropharynx bleeding  - 11/27 Accepted to [facility] for EBV eval, however family wishes to transition to [facility]. Made DNR    Past 24hrs  - No acute changes from ECLS management standpoint today, will not change out oxygenator in event of failure given plan for transition to comfort measures. Assessment/Plan:  - Pt cannulated due to hypoxemia and bilateral PTX w/ marked air leak for full lung rest   - off anticoagulation  - Continue current support  - Sweep to a degree of hypocapnia to decrease drive as needed by pt WOB  - Discussed w/ RN and ECMO specialist at bedside    - See full daily note for further plan    Apolinar Hodgson MD  Staff [address] [employer]

## 2023-11-28 NOTE — PROGRESS NOTES
Music Therapy Assessment  [address] [id number]     1975  46 y.o.  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    Aortic dissection ([address]) 11/10/2023        Date: 11/28/2023            Total Time Calculated: 45 min          [facility] 4 CV INTNSV CARE    Mental Status:   [  ] Alert [  ] [place] [  ]  Confused  [x] Minimally responsive  [  ] Sleeping    Communication Status: [  ] Impaired Speech [x] Nonverbal - Intubated    Physical Status:   [x] Oxygen in use - Intubated [  ] Hard of Hearing [  ] Vision Impaired  [  ] Ambulatory  [  ] Ambulatory with assistance [  ] Non-ambulatory     Music Preferences, Background: Pt's mother said the pt enjoys R&B; Pt's mother also mentioned Jadmichael Davis \"[facility] in the [facility] by your Side\", a song sung to him as a child;    Clinical Problem addressed: Emotional support to family     Goal(s) met in session: N/A: Please see Session Observations below.    Physical/Pain management (Scale of 1-10):    Pre-session rating ___________    Post-session rating __________  [  ] Increased relaxation   [  ] Affected breathing patterns  [  ] Decreased muscle tension   [  ] Decreased agitation  [  ] Affected heart rate    [  ] Increased alertness     Emotional/Psychological:  [  ] Increased self-expression   [  ] Decreased aggressive behavior   [  ] Decreased feelings of stress  [  ] Discussed healthy coping skills     [  ] Improved mood    [  ] openness to services, but suggested it may be best to offer 1:1, unless family happens to be present at the time this MT arrives. She shared about the pt's music preferences, as well as the 12-step program the pt has been actively apart of for the past 6 years. She thanked this MT for the support and offered services and declined further needs at this time. MT thanked her for her time and concluded the call.      DARVIN Laughlin (Music Therapist, Board Certified)  [address] [place] [address]

## 2023-11-28 NOTE — PROGRESS NOTES
peru-castor oil (VENELEX) ointment   Topical BID    insulin NPH (HumuLIN N;NovoLIN N) injection vial 16 Units  16 Units SubCUTAneous Q12H    propofol infusion  5-50 mcg/kg/min IntraVENous Continuous    HYDROmorphone HCl PF (DILAUDID) 50 mg in dextrose 5 % 50 mL infusion  0.2-4 mg/hr IntraVENous Continuous    norepinephrine (LEVOPHED) 16 mg in sodium chloride 0.9 % 250 mL infusion  1-100 mcg/min IntraVENous Continuous    HYDROmorphone HCl PF (DILAUDID) injection 1 mg  1 mg IntraVENous Q2H PRN    vasopressin (VASOSTRICT) 20 units in sodium chloride 0.9% 100 mL infusion  0.01-0.04 Units/min IntraVENous Continuous    midazolam PF (VERSED) injection 1 mg  1 mg IntraVENous Q5 Min PRN    ALPRAZolam (XANAX) tablet 0.5 mg  0.5 mg Oral Nightly PRN    [Held by provider] metoprolol tartrate (LOPRESSOR) tablet 25 mg  25 mg Oral BID    nystatin (MYCOSTATIN) 090712 UNIT/ML suspension 500,000 Units  5 mL Oral 4x Daily    phenylephrine (YFN-SYNEPHRINE) 50 mg in sodium chloride 0.9 % 250 mL infusion ([id number])   mcg/min IntraVENous Continuous    magnesium hydroxide (MILK OF MAGNESIA) 400 MG/5ML suspension 30 mL  30 mL Oral Daily PRN    HYDROmorphone (DILAUDID) tablet 1 mg  1 mg Per G Tube Q4H PRN    HYDROmorphone (DILAUDID) tablet 2 mg  2 mg Per G Tube Q4H PRN    insulin lispro (HUMALOG) injection vial 0-4 Units  0-4 Units SubCUTAneous Q6H    acetaminophen (TYLENOL) tablet 975 mg  975 mg Oral Q12H PRN    potassium bicarb-citric acid (EFFER-K) effervescent tablet 40 mEq  40 mEq Oral BID    pantoprazole (PROTONIX) 40 mg in sodium chloride (PF) 0.9 % 10 mL injection  40 mg IntraVENous Daily    ipratropium 0.5 mg-albuterol 2.5 mg (DUONEB) nebulizer solution 1 Dose  1 Dose Inhalation 4x Daily RT    budesonide (PULMICORT) nebulizer suspension 500 mcg  0.5 mg Nebulization BID RT    Vancomycin - Dosing by Pharmacy  1 each Other RX Placeholder    glucose chewable tablet 16 g  4 tablet Oral PRN    dextrose bolus 10% 125 mL  125 mL [facility]

## 2023-11-29 NOTE — PLAN OF CARE
Problem: Pain  Goal: Verbalizes/displays adequate comfort level or baseline comfort level  Outcome: Progressing  Flowsheets (Taken 11/29/2023 0813)  Verbalizes/displays adequate comfort level or baseline comfort level:   Encourage patient to monitor pain and request assistance   Assess pain using appropriate pain scale   Administer analgesics based on type and severity of pain and evaluate response
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
## 2023-11-29 NOTE — DIABETES MGMT
[facility]  DIABETES MANAGEMENT CONSULT    Consulted by Provider for advanced nursing evaluation and care for inpatient blood glucose management. Evaluation and Action Plan   This 46year old AA male was transferred from [facility] 11/9/23 to [facility] when AA noted for emergent intervention. Patient underwent repair of AA 11/10/23. Recovering in ICU. ECMO placement 11/12/23. Had been intubated, sedated and on vent support, along with multiple drugs to support rhythm and BP 11/13/23; extubated 11/14/23 but re-intubated 11/21/23 (1130am) due to air hunger and agitation. Continues to be sedated on Propofol & Precedex infusions. Off vasopressin & levo infusions. Remains on Dilaudid infusions+. TF stopped for a couple days but has been back in place & slowly advanced, now running at goal rate of 55cc/hr. Hypernatremia resolved 11/24/23. Circulation to fingers & toes compromised. Plans to withdraw on Saturday per nursing staff. As for BG management, patient did not have diabetes PTA. Insulin needs trended down from a high of 18 units/hr to <3 units/hr at the time of transition off insulin infusion 11/12/23; given 15 units of Lantus insulin. Was on corrective insulin until a small dose of basal insulin was added 11/13/23. Tube feedings were added, necessitating need for insulin to address both basal & nutritional needs. If nutiritional support is reduced or eliminated, his insulin needs will diminish. At this time, there is no need to change approach.     Management Rationale Action Plan   Medication   Basal needs Based on  [x]        Blood glucose pattern  []        Weight & BMI  [x]        Kidney dysfunction  []        Home diabetes regimen     Continue NPH insulin to 16 units twice daily   Nutritional needs Based on  []        Blood glucose pattern  []        CHO load  [x]        Enteral feeding CHO load  []        TPN/PPN dextrose load     TF running at goal rate of 55 cc/hr  (247 grams of

## 2023-11-29 NOTE — PROGRESS NOTES
[facility]   [address], [address] [facility], [address] [facility]  Phone: [phone]   [phone]    [email or web address]     Nephrology Progress Note    Patient Name : Sepideh Khan      : 1975     MRN : [id number]  Date of Admission : 11/10/2023  Date of Servive : 23    CC: Follow up for KARINE       Assessment and Plan   KARINE:  - from post op ATN  - stable renal fx w/ ongoing diuresis   - diuril and diamox this AM along with bumex  - cont TID bumex  - daily labs and I/Os    Hypernatremia:  - increase FW flushes  - diuril as above    Contraction alkalosis:  - from diuresis  - still total body volume overloaded  - diamox x 1 as above     Type A aortic dissection s/p repair and AVR on 11/10     RV failure  Hypoxia, resp failure:  - VV ECMO   - per CCM/CTS service  - re-intubated      Afib     Post op anemia:  - monitor and tx as needed     HTN:  - BP goals per CTS     Hx of Graves'  Bipolar d/o  Hx of drug abse  PTSD     Interval History:  Seen and examined. On ECMO. Stable Cr and UOP. Na and bcb rising. Remains sedated on the vent. Review of Systems: Review of systems not obtained due to patient factors.     Current Medications:   Current Facility-Administered Medications   Medication Dose Route Frequency    hydrALAZINE (APRESOLINE) injection 10 mg  10 mg IntraVENous Once    potassium bicarb-citric acid (EFFER-K) effervescent tablet 40 mEq  40 mEq Oral Q8H    vancomycin (VANCOCIN) 750 mg in sodium chloride 0.9 % 250 mL IVPB ([id number])  750 mg IntraVENous Q12H    heparin (porcine) injection 5,000 Units  5,000 Units SubCUTAneous 3 times per day    0.9 % sodium chloride infusion   IntraVENous PRN    bumetanide (BUMEX) injection 1 mg  1 mg IntraVENous Q8H    meropenem (MERREM) 1,000 mg in sodium chloride 0.9 % 100 mL IVPB (mini-bag)  1,000 mg IntraVENous Q8H    lubrifresh P.M. (artificial tears) ophthalmic ointment   Both Eyes PRN    micafungin

## 2023-11-29 NOTE — PROGRESS NOTES
0800- bedside report received and drips verified. Patient intubated, sedated, VV ECMO 4400/4.7. Evens Coello MD and Eric Reich NP at bedside for rounds, no orders at this time. 5802- propofol decreased by half for neuro assessment. 0845- levophed held; MAP 95. Patient opening eyes, no other command following at this time. 5636- patient able to move all extremities to command. Sedation resumed; sats 88%, increased work of breathing, .    0912- PRN dilaudid given; MAP 99, sats 86%, accessory muscle breathing. 1005- increasing Dilaudid drip for comfort. 1014- PRN versed given; patient agitated, sats 85%, MAP 99.    1025- increased dilaudid drip again per Jose Alfredo BLACK can continue to increase drip frequently. 1120- versed given; patient still using labored breathing. Orders to stop performing neuro changes per family request.    7244- MAP dropping to 50's, levophed restarted. 1224- continuing to titrate levophed up for MAP goal > 65.    1333- dilaudid drip titrated again, patient is still labor breathing. 1345- patient comfortable on current medication regime. 1615- pre and post oxygenator gas performed. 1945- Bedside and Verbal shift change report given to Maribel Plaza RN (oncoming nurse) by Abhijeet See RN (offgoing nurse). Report included the following information Nurse Handoff Report, Surgery Report, Recent Results, Med Rec Status, and Cardiac Rhythm SR, 1st degree .

## 2023-11-29 NOTE — PROGRESS NOTES
CRITICAL CARE NOTE    Name: Jamie Thomas   : 1975   MRN: [id number]   Date: 2023      REASON FOR ICU ADMISSION:  Type A aortic dissection     PRINCIPAL ICU DIAGNOSIS   Type A aortic dissection s/p repair and AVR  Acute hypoxic respiratory failure, VV- ECMO  Shock, presume septic  R pneumothorax w/ persistent airleak  PNA  COPD, bullous emphysema  TEOFILO  HTN  KARINE  Hypernatremia  Afib, post procedure  Hx graves  Bipolar d/o  PTSD  Hx distant PSUD, current smoking    BRIEF PATIENT SUMMARY   48M txfr from OSH for emergent Type A aortic dissection repair and AVR. Patient arrives to CVICU intubated, sedated for routine post-op care. Surgery lasted approximately 12 hours, entered OR ~0400, arrived to CVICU ~1600 hrs. OR course complicated by bleeding, rec'd multiple products, VF episode requiring DCCV x 1. Underwent ECMO cannulation due to hypoxia and L PTX w/ persistent air leak in AM . Weaned off inotropic support. Extubated . O/n - pt w/ persistent significant work of breathing, air leak to R chest tube becoming continuous. Decision made to re-intubate patient, bronched w/ mucus plugging to entire airway. Pt w/ increasing vasopressor requirements 2/2 pseudomonas PNA. Bival stopped due to oral and tracheal bleeding. Improving vasopressor requirements after change to merrem and inh tobramycin. Pt w/ improvement of septic shock 2/2 pseudomonas PNA, however w/ persistent air leak from R chest tube consistent w/ BPF and continuing to require V-V ECMO support for hypoxemic respiratory failure. Pt discussed w/ [facility] thoracic surgery with acceptance for evaluation for EBVs or other intervention for BPF. After extensive discussion w/ pt's family by myself and Dr. Ananya Herzog, pt's family w/ decision to decline transfer to [address] [place] [address], and defer any further procedural interventions.  They stated that at this time they wish to focus on pt's comfort, and would like to pursue comfort measures only with reviewed and interpreted patient data including clinical events, labs, images, vital signs, I/O's, and examined patient. I have discussed the case and the plan and management of the patient's care with the consulting services, the bedside nurses and the respiratory therapist.      NOTE OF PERSONAL INVOLVEMENT IN CARE   This patient has a high probability of imminent, clinically significant deterioration, which requires the highest level of preparedness to intervene urgently. I participated in the decision-making and personally managed or directed the management of the following life and organ supporting interventions that required my frequent assessment to treat or prevent imminent deterioration. I personally spent 40 minutes of critical care time. This is time spent at this critically ill patient's bedside actively involved in patient care as well as the coordination of care. This does not include any procedural time which has been billed separately.     Lena Crystal MD   Critical Care Medicine  [facility]
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
## 2023-11-30 NOTE — PROGRESS NOTES
1945- Bedside and Verbal shift change report given to Russell Sepulveda RN (oncoming nurse) by Stone Diamond RN (offgoing nurse). Report included the following information Nurse Handoff Report, Surgery Report, Recent Results, Med Rec Status, and Cardiac Rhythm SR, 1st degree . Assessment performed      ECMO  Rpm 4400 Flow 4.8, Sweep 4, FdiO2 100% multiple clots in oxygenator. Gtts: Norepi 8  Prop 45, Dil 3.5, Dex 1.5    2000 - ABG 7.47/51.5/57/37/93.4    0400 -pre and post completed. ABG  7.53/ 46.8/ 62 /38.3/93. 2. Sweep increased 4.5  K 3.2 replaced with 20 meqx 2    0750- Bedside and Verbal shift change report given to Altagracia GALLARDO (oncoming nurse) by Russell Sepulveda RN (offgoing nurse).  Report included the following information Nurse Handoff Report, Surgery Report, Recent Results, Med Rec Status, and Cardiac Rhythm SR, 1st degree     ECMO  Rpm 4400 Flow 4.8, Sweep 4.5 FdiO2 100%    Gtts: Norepi 4 Prop 45, Dil 2.5, Dex 1.5

## 2023-11-30 NOTE — PROGRESS NOTES
[facility]   [address], [address] [facility], [address] [facility]  Phone: [phone]   [phone]    [email or web address]     Nephrology Progress Note    Patient Name : Ailyn Wagner      : 1975     MRN : [id number]  Date of Admission : 11/10/2023  Date of Servive : 23    CC: Follow up for KARINE       Assessment and Plan   KARINE:  - from post op ATN  - stable renal fx w/ ongoing diuresis   - diuril and bumex this am  - daily labs and I/Os    Hypernatremia:  - cont FW flushes  - diuril as above    Contraction alkalosis:  - from diuresis  - still total body volume overloaded  - diamox x 1 as above     Type A aortic dissection s/p repair and AVR on 11/10     RV failure  Hypoxia, resp failure:  - VV ECMO   - per CCM/CTS service  - re-intubated      Afib     Post op anemia:  - monitor and tx as needed     HTN:  - BP goals per CTS     Hx of Graves'  Bipolar d/o  Hx of drug abse  PTSD     Interval History:  Seen and examined. On ECMO. Stable Cr and UOP. Na up today. Remains sedated on the vent. Review of Systems: Review of systems not obtained due to patient factors.     Current Medications:   Current Facility-Administered Medications   Medication Dose Route Frequency    meropenem (MERREM) 1,000 mg in sodium chloride 0.9 % 100 mL IVPB (mini-bag)  1,000 mg IntraVENous Q8H    [START ON 2023] Vancomycin level -  @ 0600   Other Once    potassium bicarb-citric acid (EFFER-K) effervescent tablet 40 mEq  40 mEq Oral Q8H    vancomycin (VANCOCIN) 750 mg in sodium chloride 0.9 % 250 mL IVPB ([id number])  750 mg IntraVENous Q12H    heparin (porcine) injection 5,000 Units  5,000 Units SubCUTAneous 3 times per day    0.9 % sodium chloride infusion   IntraVENous PRN    bumetanide (BUMEX) injection 1 mg  1 mg IntraVENous Q8H    lubrifresh P.M. (artificial tears) ophthalmic ointment   Both Eyes PRN    micafungin (MYCAMINE) 150 mg in sodium chloride 0.9 % 100 mL IVPB 0700  In: 8205.1 [I.V.:2315.9]  Out: 7410 [Urine:6310]    Physical Examination:  General: On vent   Neck:  R neck ECMO cannulas   Resp:  Diminished R base   CV:  Irregular,   GI:  Soft, NT, + BS, no HS megaly  Neurologic:  Unable to assess  :  Wilson +      []    High complexity decision making was performed  []    Patient is at high-risk of decompensation with multiple organ involvement    Lab Data Personally Reviewed: I have reviewed all the pertinent labs, microbiology data and radiology studies during assessment. Labs:  Recent Labs     11/28/23 1728 11/29/23 0405 11/30/23 0400   * 152* 152*   K 3.4* 3.7 3.2*   * 111* 111*   CO2 37* 37* 37*   GLUCOSE 114* 126* 96   BUN 77* 67* 60*   CREATININE 1.19 1.17 1.04   CALCIUM 8.4* 8.0* 8.4*         Recent Labs     11/28/23 0404 11/28/23 1728 11/29/23 0405 11/30/23 0400   WBC 13.9*  --  14.5* 9.6   RBC 2.60*  --  2.60* 2.53*   HGB 7.5* 7.7* 7.5* 7.3*   HCT 25.3* 26.5* 26.1* 25.0*   MCV 97.3  --  100.4* 98.8   MCH 28.8  --  28.8 28.9   MCHC 29.6*  --  28.7* 29.2*   RDW 18.6*  --  18.1* 18.0*   PLT 85*  --  95* 95*   MPV 12.6  --  11.6 12.1       Recent Labs     11/28/23 1728 11/29/23 0405 11/30/23  0400   GLOB 4.2* 4.2* 4.2*       No results for input(s): \"INR\", \"APTT\" in the last 72 hours. Invalid input(s): \"PTP\"     No results for input(s): \"CPK\", \"CKMB\", \"TROPONINI\" in the last 72 hours. Invalid input(s): \"B-NP\"  Invalid input(s): \"PHI\", \"PCO2I\", \"PO2I\", \"FIO2I\"     Ventilator:       Microbiology:  No results found for: \"SDES\"  No components found for: \"CULT\"      I have reviewed the flowsheets. Chart and Pertinent Notes have been reviewed. No change in PMH ,family and social history from Consult note.       Anthony Feliz MD  [facility]

## 2023-11-30 NOTE — PROGRESS NOTES
0800: Bedside and Verbal shift change report given to Altagracia GALLARDO (oncoming nurse) by Snow Perez RN (offgoing nurse). Report included the following information Nurse Handoff Report. ECMO: 4400/4. 8/Sweep 4.5; multiple clots in oxygenator. Gtts: Levo 4, Prop 45, Dilaudid 2.5, Dex 1.5    0808: AB.47/50.8/60/36.7/91.6%. Q8 ABGs per Intensivist.    1140: Pt poorly tolerated turn to L side. Pt A Fib 110s, sats 80-83%, RR 30, SBPs 150s. 1mg Versed given. 1200: Verbal order by Dr Cj Abel to give 1100 Diuril 30 minutes before 1600 Bumex administration. 1600: No pre/post ABG per ECMO specialist.    2000: Bedside and Verbal shift change report given to [address] [place] (oncoming nurse) by Octavio Joyner (offgoing nurse). Report included the following information Nurse Handoff Report.

## 2023-11-30 NOTE — DIABETES MGMT
[facility]  DIABETES MANAGEMENT      Evaluation and Action Plan   This 46year old AA male was transferred from [facility] 11/9/23 to [facility] when AA noted for emergent intervention. Patient underwent repair of AA 11/10/23. Recovering in ICU. ECMO placement 11/12/23. Had been intubated, sedated and on vent support, along with multiple drugs to support rhythm and BP 11/13/23; extubated 11/14/23 but re-intubated 11/21/23 (1130am) due to air hunger and agitation. Continues to be sedated on Propofol & Precedex infusions. Off vasopressin & levo infusions. Remains on Dilaudid infusions+. TF stopped for a couple days but has been back in place & slowly advanced, now running at goal rate of 55cc/hr. Hypernatremia resolved 11/24/23. Circulation to fingers & toes compromised. Plans to withdraw on Saturday per nursing staff. As for BG management, patient did not have diabetes PTA. Insulin needs trended down from a high of 18 units/hr to <3 units/hr at the time of transition off insulin infusion 11/12/23; given 15 units of Lantus insulin. Was on corrective insulin until a small dose of basal insulin was added 11/13/23. Tube feedings were added, necessitating need for insulin to address both basal & nutritional needs. If nutiritional support is reduced or eliminated, his insulin needs will diminish. At this time, there is no need to change approach. Blood glucose pattern:      Evaluation: Current insulin dosing to override tube feeding and basal needs sufficient. Plans for withdrawal noted. Will sign off.     Jan Geronimo DNP, RN, ACNS-BC, BC-ADM, [facility]  Clinical Nurse Specialist-Diabetes & Endocrine disorders    [facility] (In-patient CNS consult service)  [phone]

## 2023-11-30 NOTE — PROGRESS NOTES
CRITICAL CARE NOTE    Name: Wilda Lockhart   : 1975   MRN: [id number]   Date: 2023      REASON FOR ICU ADMISSION:  Type A aortic dissection     PRINCIPAL ICU DIAGNOSIS   Type A aortic dissection s/p repair and AVR  Acute hypoxic respiratory failure, VV- ECMO  Shock, presume septic  R pneumothorax w/ persistent airleak  PNA  COPD, bullous emphysema  TEOFILO  HTN  KARINE  Hypernatremia  Afib, post procedure  Hx graves  Bipolar d/o  PTSD  Hx distant PSUD, current smoking    BRIEF PATIENT SUMMARY   48M txfr from OSH for emergent Type A aortic dissection repair and AVR. Patient arrives to CVICU intubated, sedated for routine post-op care. Surgery lasted approximately 12 hours, entered OR ~0400, arrived to CVICU ~1600 hrs. OR course complicated by bleeding, rec'd multiple products, VF episode requiring DCCV x 1. Underwent ECMO cannulation due to hypoxia and L PTX w/ persistent air leak in AM . Weaned off inotropic support. Extubated . O/n - pt w/ persistent significant work of breathing, air leak to R chest tube becoming continuous. Decision made to re-intubate patient, bronched w/ mucus plugging to entire airway. Pt w/ increasing vasopressor requirements 2/2 pseudomonas PNA. Bival stopped due to oral and tracheal bleeding. Improving vasopressor requirements after change to merrem and inh tobramycin. Pt w/ improvement of septic shock 2/2 pseudomonas PNA, however w/ persistent air leak from R chest tube consistent w/ BPF and continuing to require V-V ECMO support for hypoxemic respiratory failure. Pt discussed w/ [facility] thoracic surgery with acceptance for evaluation for EBVs or other intervention for BPF. After extensive discussion w/ pt's family by myself and Dr. Zaria Enriquez, pt's family w/ decision to decline transfer to [facility], and defer any further procedural interventions.  They stated that at this time they wish to focus on pt's comfort, and would like to pursue comfort measures only with

## 2023-11-30 NOTE — PROGRESS NOTES
Incision clean, dry and intact   Heart:   Regular rate and rhythm and no murmurs, rubs or gallops   Abdomen:    Mildly distended, no overt tenderness; TF at 60mL/hf today; FMS in place, increased output with resumption of TF   Extremities:  2+ pitting edema. Weak pulses on the L, with bilateral toes and distal foot mottling; R digits 2,3,4 mottled and swollen, L ring finger mottled and swollen   Neurologic:  Follows commands, wiggled toes bilaterally and squeezed hands with equal strength bilaterally       Lab Data Reviewed:   Recent Labs     11/30/23  0400   WBC 9.6   HGB 7.3*   HCT 25.0*   PLT 95*   *   K 3.2*   BUN 60*   CTA 11/13/23: IMPRESSION:  1. Status post aortic valve and ascending aortic arch repair. 2.  The thoracoabdominal dissection now originates at the brachiocephalic artery  just distal to the repair. It extends to the right common and left external  iliac arteries. The aortic branch vessels are all supplied by the true lumen. The dissection minimally extends into the brachiocephalic and left renal  arteries. This results in mild stenosis of the proximal left renal artery. 3.  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 Otherwise, the  there is normal three-vessel right lower extremity runoff. 4.  On the left, the dissection extends into the external iliac artery and  results in moderate proximal stenosis of the left common iliac artery. Otherwise, there is normal three-vessel left lower extremity runoff. 5.  Consolidation and volume loss of the right lateral middle lobe, entire right  lower lobe, and nearly the entire left lower lobes. This likely reflects  atelectasis/collapse, although superimposed pneumonia or aspiration difficult to  exclude. Severe emphysema of the residual aerated lung.      6.  Postoperative support hardware including ECMO cannula, left internal which is improving   - Bival and ASA held since 11/22  - LD stable 531, no current plan to resume anticoagulation  - Some clots in oxygenator but stable amount  - Heparin SQ started 11/27    Acute Kidney injury, Stage III: CTA 11/13 showed dissection minimally extends to left renal artery; No known pre-operative kidney disease.  Pre-op Cr 1.2  - Cr remains stable 1.04 today   - Nephrology following - managing diuretics, excellent urine output past 24 hours   - Continue bumex 1mg every 8 hours  - Continue miles catheter for accurate I/O  - monitor Is/Os, CMP    Hypokalemia:  - Continue scheduled effer-K with addition to repletion per protocol    Hypernatremia:   - Sodium 152 today, slightly worse  -  ml Q3  - BMP daily    Acute Post operative Transaminitis: secondary to acute RV failure and shock liver; had down trended but increased again (11/22-11/25) most likely r/t sepsis  - Tbili remains mildly elevated at 2.3  - ok to cont PO amiodarone  - tylenol 2 gm 24 hour max  - Trend hepatic panel     Acute postoperative blood loss anemia: Secondary to consumption, critical illness, and bleeding  - Hgb 7.3, no acute need for transfusion this morning   - Repeat CBC daily    Thrombocytopenia: Above transfusion goal. Likely d/t consumption between emergent surgery and ECMO cannulation  - Hold bival drip for bleeding 11/22  - ASA held again 11/22; SQ heparin started 11/27  - No acute indication to transfuse  - CBC daily    Leukocytosis:   - Blood cultures 11/20 negative  - Respiratory culture 11/22 + heavy pseudomonas aeruginosa, ID consulted -- ABX as below  Vancomycin (11/12 - )  Merrem added after pseudomonas results (11/22 - )  Tobramycin (11/22 - )  Micafungin (11/23 - )   Fungitell positive on 11/23 (resulted 11/29)  Zosyn (11/12-11/20)    Cefepime and flagyl (11/20-11/22)  - Continue to trend WBC, Lactic, Procal  - Would continue ABX until family definitively discusses withdrawal    Pseudomonas VAP: Resp cx 11/22

## 2023-12-01 NOTE — PROGRESS NOTES
0800: Bedside and Verbal shift change report given to Altagracia GALLARDO (oncoming nurse) by Kyleigh Roldan RN (offgoing nurse). Report included the following information Nurse Handoff Report. ECMO: 4400, 4.8, Sweep 4.5, 100%  Gtts: Levo 6, Prop 45, Dex 1.5, Dilaudid 3.    0830: Dr Percy Gonzalez at bedside - will hold diuretics as transitioning to comfort. OK to pull DHT and d/c water flushes. 0845Shena Houston NP on plan of care from nephrology - telephone order to pull DHT. 1032: Call placed to [facility]. Tomorrow RN responsible for calling with LINDA for EMR review.

## 2023-12-01 NOTE — PROGRESS NOTES
Music Therapy Assessment  [address] [id number]     1975  46 y.o.  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    Aortic dissection ([address]) 11/10/2023        Date: 12/1/2023            Total Time Calculated: 25 min          [facility] 4 CV INTNSV CARE    Mental Status:   [  ] Alert [  ] Calvo Polio [  ]  Confused  [x] Minimally responsive  [  ] Sleeping    Communication Status: [  ] Impaired Speech [  ] Nonverbal -N/A    Physical Status:   [x] Oxygen in use -intubated [  ] Hard of Hearing [  ] Vision Impaired  [  ] Ambulatory  [  ] Ambulatory with assistance [  ] Non-ambulatory     Music Preferences, Background: R&B    Clinical Problem addressed: Spiritual support; Comfort    Goal(s) met in session: N/A: Please see Session Observations below.    Physical/Pain management (Scale of 1-10):    Pre-session rating ___________    Post-session rating __________  [  ] Increased relaxation   [  ] Affected breathing patterns  [  ] Decreased muscle tension   [  ] Decreased agitation  [  ] Affected heart rate    [  ] Increased alertness     Emotional/Psychological:  [  ] Increased self-expression   [  ] Decreased aggressive behavior   [  ] Decreased feelings of stress  [  ] Discussed healthy coping skills     [  ] Improved mood    [  ] Decreased withdrawn behavior     Social:  [  ] Decreased feelings of isolation/loneliness [  ] Positive social interaction   [x] Provided support and/or

## 2023-12-01 NOTE — PROGRESS NOTES
Pharmacist Note - Vancomycin Dosing  Therapy day 19  Indication: empiric  Current regimen: 750 mg IV Q12hrs    Recent Labs     11/29/23  0405 11/30/23  0400 12/01/23  0400   WBC 14.5* 9.6 8.9   CREATININE 1.17 1.04 1.04   BUN 67* 60* 56*       A random vancomycin level of 25.8 mcg/mL was obtained and from this level, the patient's AUC24 is calculated to be 493 with the current regimen. Goal target range AUC/PAVEL 400-600      Plan: Continue current regimen. Pharmacy will continue to monitor this patient daily for changes in clinical status and renal function. *Random vancomycin levels are used to calculate AUC/PAVEL, this level should not be interpreted as a trough. Vancomycin has been dosed using Bayesian kinetics software to target an AUC24:PAVEL of 400-600, which provides adequate exposure for as assumed infection due to MRSA with an PAVEL of 1 or less while reducing the risk of nephrotoxicity as seen with traditional trough based dosing goals.

## 2023-12-01 NOTE — PROGRESS NOTES
2000: Bedside and Verbal shift change report given to [facility] Lisette Culver (oncoming nurse) by Jeffry Noel (offgoing nurse). Report included the following information Nurse Handoff Report     ECMO: RPM 4400/ Flow 4.8/ Sweep 4.5 .multiple clots in oxygenator. Gtts: Levo 6, Prop 45, Dilaudid 2.5, Dex 1.5    ABG 7.51 /48.3 /59 /37 /92. No changes to ecmo    0400 ABG 7.55/ 44.5/ 62/ 38.9/ 93. No changes to ecmo   K 3.4 replaced with 20 meq x 2    0800- Bedside and Verbal shift change report given to Génesis (oncoming nurse) by Fay Melissa RN (offgoing nurse).  Report included the following information Nurse Handoff Report, Surgery Report, Recent Results, Med Rec Status, and Cardiac     ECMO: RPM 4400/ Flow 4.8/ Sweep 4.5     Gtts: Levo 4, Prop 45, Dilaudid 3, Dex 1.5

## 2023-12-01 NOTE — PROGRESS NOTES
[facility] FLOOR Progress Note    Admit Date: 11/10/2023    Procedure:    11/13/2023 with Dr. Beny Best, ASHLEIGH BY DR Kain Jimenez    11/10/2023 with Dr. Noemi Geiger:  RIGHT AXILLARY CUTDOWN [address]; AVR, ROOT AND ASCENGING AORTIC ANEURYSM REPAIR WITH 29MM KONECT RESILIA  AORTIC VALVED CONDUIT; ECC; CIRCULATORY ARREST; ASHLEIGH & EPIAORTIC US BY DR. Kirstin Fragoso & DR. CANTU [facility] Synopsis:  POD0 11/10: Ascending aortic aneurysm repair and AVR with aortic valved conduit. Vfib arrest requiring cardioversion and 20 sec of CPR. Transferred to CVICU on Pneylephrine, Amiodarone, Vasopressin, Levophed, precedex and insulin. LVEF reported as Low normal, with dyskinetic jew-vxxyufxgijmgw-zctywbmwfsee wall motion in post op ASHLEIGH. Albumenx4, FFP, platelets, 1L LR. Neuro check moved all extremities, nodded appropriately. Wean levo, Vaso and Epi added  POD1 11/11:  Febrile. On Epi. FiO2 60% increased to 80%, ACVC PEEP 10. Nicardipine for SBP ,120. PO amio and lipitor held for elevated liver enzymes. Albuminx2. Tylenol for fever. POD2 11/12: PEEP increased to 12, FiO2 to 70% by intensivist. ET suctioned thick secretions. Pt spontaneously awakening with increasing sedation requirements. Frequent desaturations overnight. Tmax 102. On Epi with plans to transition to dobutamine. POD3 11/13: Increasing ventilation and sedation requirements. FiO2 100%. PEEP of 8 for bollous emphysema. Off Epi. Dobut 2.5 mcg. Brought to OR by Dr. Noemi Geiger for placement of VV ECMO. Post op ASHLEIGH LVEF 55% with normal global function. SBP goal 120. Transfuse pltlts. POD4 11/14: Extubated. POD5 11/15: [place] catheter repositioned under ultrasound. Discussed with [facility]. Not lung transplant candidate 10 years ago. Open to eval but unlikely. Possibly discuss with [facility] for transplant if unable to bridge to recovery  POD6 11/16: Increased diureses to Lasix 60 mg IV BID. Adding Metolazone for diurese/hypernatremia. On D50 38.5 mEq. right common and left external iliac arteries; CTA results above  - SBP as discussed    A-Fib, post procedure: Afib 11/21.  - DC amio infusion on 11/26, transition to 400mg PO BID  - Maintain Mg > 2.5 and K > 4.0    Acute Right Ventricular Failure: RV function decreased on intra-op ASHLEIGH during VV ECMO. - improved/stable - monitor LFTs, CVP, periodic echos    Acute Post operative Respiratory Failure on Chronic respiratory insufficiency secondary to COPD w/bullous emphysema, smoking: s/p VV ECMO cannulation 11/13  - Family plans on moving forward with compassionate extubation and cessation of VV ECMO tomorrow morning around 1100, Dr. Diana Mehta will be here  - VV ECMO stable   - High sweep to decrease respiratory drive, intentional hypocapnia  - Vent settings minimal to prevent further trauma to lungs  - Cont Nebs and acetylcysteine (MUCOMYST)  -  Fluid management aided by nephrology. - Spoke with Dr. Ofelia Tucker, [facility] pulmonologist for lung transplant - d/t his current smoking they would not accept him as a lung tx candidate. She evaluated his previous CT from 10 years ago and the note written by the pulmonologist he saw at [facility]. Anticoagulation on VV ECMO:  - Required multiple transfusions with bleeding from mouth and ETT which is improving   - Bival and ASA held since 11/22  - LD stable 531, no current plan to resume anticoagulation  - Some clots in oxygenator but stable amount  - Heparin SQ started 11/27    Acute Kidney injury, Stage III: CTA 11/13 showed dissection minimally extends to left renal artery; No known pre-operative kidney disease.  Pre-op Cr 1.2  - Cr remains stable 1.04 today   - Nephrology following - managing diuretics, excellent urine output past 24 hours   - Continue bumex 1mg every 8 hours  - Continue miles catheter for accurate I/O  - monitor Is/Os, CMP    Hypokalemia:  - Continue scheduled effer-K with addition to repletion per protocol    Hypernatremia:   - Sodium 152 today, slightly

## 2023-12-01 NOTE — PROGRESS NOTES
Accompanied Palliative NP Lyle as she had a meeting with Mary Jo Miranda, Deepa Green and one other family member to talk about the scheduled transition to comfort care tomorrow. Chaplain resident Jeison Vizcaino will be present tomorrow and will offer spiritual care support, as needed, the family and friends who will gather.    Chaplain Derrick, MDiv, MS, [facility]

## 2023-12-01 NOTE — PROGRESS NOTES
CRITICAL CARE NOTE    Name: Nicki Wu   : 1975   MRN: [id number]   Date: 2023      REASON FOR ICU ADMISSION:  Type A aortic dissection     PRINCIPAL ICU DIAGNOSIS   Type A aortic dissection s/p repair and AVR  Acute hypoxic respiratory failure, VV- ECMO  Shock, presume septic  R pneumothorax w/ persistent airleak  PNA  COPD, bullous emphysema  TEOFILO  HTN  KARINE  Hypernatremia  Afib, post procedure  Hx graves  Bipolar d/o  PTSD  Hx distant PSUD, current smoking    BRIEF PATIENT SUMMARY   48M txfr from OSH for emergent Type A aortic dissection repair and AVR. Patient arrives to CVICU intubated, sedated for routine post-op care. Surgery lasted approximately 12 hours, entered OR ~0400, arrived to CVICU ~1600 hrs. OR course complicated by bleeding, rec'd multiple products, VF episode requiring DCCV x 1. Underwent ECMO cannulation due to hypoxia and L PTX w/ persistent air leak in AM . Weaned off inotropic support. Extubated . O/n - pt w/ persistent significant work of breathing, air leak to R chest tube becoming continuous. Decision made to re-intubate patient, bronched w/ mucus plugging to entire airway. Pt w/ increasing vasopressor requirements 2/2 pseudomonas PNA. Bival stopped due to oral and tracheal bleeding. Improving vasopressor requirements after change to merrem and inh tobramycin. Pt w/ improvement of septic shock 2/2 pseudomonas PNA, however w/ persistent air leak from R chest tube consistent w/ BPF and continuing to require V-V ECMO support for hypoxemic respiratory failure. Pt [facility] w/ [facility] with acceptance for evaluation for EBVs or other intervention for BPF. After extensive discussion w/ pt's family by myself and Dr. Fay Del Castillo, pt's family w/ decision to decline transfer to [facility], and defer any further procedural interventions.  They stated that at this time they wish to focus on pt's comfort, and would like to pursue comfort measures only with

## 2023-12-02 NOTE — PROGRESS NOTES
0800: Report received from Kimberly Castellanos. Drips dual verified. Plan of care discussed for the day. 0845: Spiritual car paged regarding plan of care for the day, will come at 11 to support family if needed. 0900: Pt's family at bedside. Time offered for questions and concerns. 1018: Pt's family at bedside. 1100: Pastoral care at bedside. 1116: Prn medications administered, propofol infusion stopped. 1130: Pt. Extubated with RT, RN and Dr. Julia Valenzuela. Pt. Medicated per Dr. Julia Valenzuela for comfort measures. 1140: Pt's family at bedside with RN and pastoral care. ECMO circuit clamped by specialty care RN. Levo gtt stopped. 1153: Dr. Julia Valenzuela at bedside. TOD 1153. Pastoral care notified, nursing supervisor Iram Marquez notified. Dr. Ninoska Howe intensivist notified. 1230: [employer] paged regarding patients time of death. 1359: April from [facility] on the phone, will reach out to patients father regarding eye and tissue donation. Leave blank on death certificate at this time. 1430: Pt. Transported to [facility] by 2 RN's. All belongings given to family.

## 2023-12-02 NOTE — DEATH NOTES
Death Pronouncement Note  Patient's Name: [facility]   Patient's YOB: 1975  MRN Number: [id number]    Admitting Provider: Yumiko Whyte MD  Attending Provider: Yumiko Whyte MD    Patient was examined and the following were absent: Pulses, Blood Pressure, and Respiratory effort    I declared the patient dead on 12/2/2023 at 11:53 AM    Preliminary Cause of Death:  Aortic dissection [facility])     Electronically signed by Zana Sims MD on 12/2/23 at 2:55 PM EST

## 2023-12-02 NOTE — DISCHARGE SUMMARY
Death Discharge Summary      Patient ID:  Matthew Turcios  46 y.o.  1975  [id number]    Admit Date: 11/10/2023    Attending Physician:  Román Reina MD    Chief Complaint:    Chief Complaint   Patient presents with    Chest Pain    Shortness of Breath       Problem List:    Patient Active Problem List    Diagnosis Date Noted    Palliative care encounter 2023    Hypernatremia 2023    On tube feeding diet 2023    Sleep apnea 2023    KARINE (acute kidney injury) ([address]) 2023    Elevated liver enzymes 2023    Respiratory failure ([address]) 2023    Hyperglycemia 2023    S/P ascending aortic aneurysm repair 11/10/2023    S/P aortic valve replacement 11/10/2023    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 Course complicated by resp failure, hypoxemia, pnemothorax with persistent air leak. After prolonged MV and ECMO the family decided to change to comfort care and he passed.         Condition at discharge:       [place]

## 2023-12-02 NOTE — PROGRESS NOTES
Spiritual Care Assessment/Progress Note  [facility]    Name: Glenna Parr MRN: [id number]    Age: 46 y.o. Sex: male   Language: English     Date: 12/2/2023            Total Time Calculated: 59 min              Spiritual Assessment begun in [facility] 4 CV INTNSV CARE  Service Provided For[de-identified] Family  Referral/Consult From[de-identified] Nurse, Other   Encounter Overview/Reason : Grief, Loss, and Adjustments    Spiritual beliefs:      [x] Involved in a armen tradition/spiritual practice:      [x] Supported by a armen community:      [] Claims no spiritual orientation:      [] Seeking spiritual identity:           [] Adheres to an individual form of spirituality:      [] Not able to assess:                Identified resources for coping and support system:   Support System: Family members, Children       [x] Prayer                  [] Devotional reading               [] Music                  [] Guided Imagery     [] Pet visits                                        [] Other: (COMMENT)     Specific area/focus of visit   Encounter:    Crisis:    Spiritual/Emotional needs: Type: Emotional Distress  Ritual, Rites and Sacraments:    Grief, Loss, and Adjustments: Type: End of Life  Ethics/Mediation:    Behavioral Health:    Palliative Care: Type: Palliative Care, Patient/Family Care Conference  Advance Care Planning:      Plan/Referrals: No future visits requested    Narrative: This ws in response to a page from a nurse in CVICU. The nurse reminded the  about the extubation that took place during the day. The previous  already brief the present  of what was going to happen. The  reported to the unit before the scheduled time to coordinate with the nurse and the physician. The family had their ministers who were the mother and the step father of the patient. The  was tasked by the medical team to fetch the family in the waiting room.  As soon as everything was ready, the

## 2023-12-02 NOTE — PROGRESS NOTES
Plan for withdrawal of care today with family. Plan discussed with bedside RN, Dr. Marcelo Retana. Orders placed.

## 2023-12-02 NOTE — PROGRESS NOTES
1945 - Bedside and Verbal shift change report given to Zack Linn RN (oncoming nurse) by Misha Piper RN (offgoing nurse). Report included the following information Nurse Handoff Report, Surgery Report, Recent Results, Med Rec Status, and Cardiac Rhythm SR, 1st degree . Assessment performed     ECMO: RPM 4400/ Flow 4.78/ Sweep 5     Gtts: Levo 6, Prop 45, Dilaudid 3, Dex 1.5      0800 Bedside and Written shift change report given to [facility]  RN(oncoming nurse) by Zack Linn RN (offgoing nurse). Report included the following information ED Encounter Summary, Adult Overview, Surgery Report, Intake/Output, Med Rec Status, Cardiac Rhythm Afib , and Event Log.  Pallative plan

## 2023-12-04 LAB
BASE EXCESS BLDV CALC-SCNC: 7.8 MMOL/L
BDY SITE: ABNORMAL
FIO2 ON VENT: 40 %
HCO3 BLDV-SCNC: 33 MMOL/L (ref 23–28)
PCO2 BLDV: 48.5 MMHG (ref 41–51)
PH BLDV: 7.45 (ref 7.32–7.42)
PO2 BLDV: 36 MMHG (ref 25–40)
SAO2% DEVICE SAO2% SENSOR NAME: ABNORMAL
SERVICE CMNT-IMP: ABNORMAL
SPECIMEN SITE: ABNORMAL

## (undated) DEVICE — CENTRAL VENOUS CATHETER SET: Brand: COOK

## (undated) DEVICE — SUTURE VCRL SZ 0 L18IN ABSRB VLT L40MM CT 1/2 CIR J752D

## (undated) DEVICE — TIDISHIELD TRANSPORT, CONTAINMENT COVER FOR BACK TABLE 4'6" (1.37M) TO 8' (2.43M) IN LENGTH: Brand: TIDISHIELD

## (undated) DEVICE — SUTURE PROL SZ 5-0 L30IN NONABSORBABLE BLU L13MM RB-2 1/2 8710H

## (undated) DEVICE — INFECTION CONTROL KIT SYS

## (undated) DEVICE — BASIN ST MAJOR-NO CAUTERY: Brand: MEDLINE INDUSTRIES, INC.

## (undated) DEVICE — CV INCISE SHEET: Brand: CONVERTORS

## (undated) DEVICE — WILSON FRAME STYLE POSITIONING KITS - 	FRAME PAD SLEEVES (SET OF 2) , DRAPE PROTECTOR, BAR PROTECTOR 7" COMFORT FOAM HEADREST, LAMINECTOMY ARM CRADLES: Brand: SOULE MEDICAL

## (undated) DEVICE — 1LYRTR 16FR10ML100%SILTMPS SNP: Brand: MEDLINE INDUSTRIES, INC.

## (undated) DEVICE — COR-KNOT® QUICK LOAD® SINGLES: Brand: COR-KNOT® QUICK LOAD®

## (undated) DEVICE — OPEN HEART B-RICHMOND: Brand: MEDLINE INDUSTRIES, INC.

## (undated) DEVICE — TOOL 14MH30 LEGEND 14CM 3MM: Brand: MIDAS REX ™

## (undated) DEVICE — SUTURE VCRL SZ 0 L36IN ABSRB UD L36MM CT-1 1/2 CIR J946H

## (undated) DEVICE — CODMAN® SURGICAL PATTIES 3/4" X 3/4" (1.91CM X 1.91CM): Brand: CODMAN®

## (undated) DEVICE — MAGNETIC INSTR DRAPE 20X16: Brand: MEDLINE INDUSTRIES, INC.

## (undated) DEVICE — SYRINGE MEDICAL 3ML CLEAR PLASTIC STANDARD NON CONTROL LUERLOCK TIP DISPOSABLE

## (undated) DEVICE — SUTURE NONABSORBABLE MONOFILAMENT 4-0 RB-1 36 IN BLU PROLENE 8557H

## (undated) DEVICE — TOWEL,OR,DSP,ST,BLUE,STD,2/PK,40PK/CS: Brand: MEDLINE

## (undated) DEVICE — FOGARTY - HYDRAGRIP SURGICAL - CLAMP INSERTS: Brand: FOGARTY SOFTJAW

## (undated) DEVICE — 1010 S-DRAPE TOWEL DRAPE 10/BX: Brand: STERI-DRAPE™

## (undated) DEVICE — Z DISCONTINUEDSOLUTION PREP 2OZ 10% POVIDONE IOD SCR CAP BTL

## (undated) DEVICE — TUBING, SUCTION, 1/4" X 10', STRAIGHT: Brand: MEDLINE

## (undated) DEVICE — GLOVE ORANGE PI 7 1/2   MSG9075

## (undated) DEVICE — COVER,LIGHT,CAMERA,HARD,1/PK,STRL: Brand: MEDLINE

## (undated) DEVICE — Device

## (undated) DEVICE — SOLUTION IV 500ML 0.9% SOD CHL PH 5 INJ USP VIAFLX PLAS

## (undated) DEVICE — INTENDED FOR TISSUE SEPARATION, AND OTHER PROCEDURES THAT REQUIRE A SHARP SURGICAL BLADE TO PUNCTURE OR CUT.: Brand: BARD-PARKER ® CARBON RIB-BACK BLADES

## (undated) DEVICE — SUTURE MCRYL SZ 3-0 L27IN ABSRB UD L19MM PS-2 3/8 CIR PRIM Y427H

## (undated) DEVICE — SET TRNQT L55IN RED BLU CLR CLR CODE TB DLP

## (undated) DEVICE — Z DISCONTINUED NO SUB IDED CATHETER IV 14GA L2IN POLYUR STR ORNG HUB SFTY RADPQ DISP

## (undated) DEVICE — SOLUTION IV 1000ML PH 7.4 INJ NRMSOL R

## (undated) DEVICE — AGENT HEMSTAT W4XL4IN OXIDIZED REGENERATED CELOS ABSRB SFT

## (undated) DEVICE — SUTURE PERMA-HAND SZ 0 L30IN NONABSORBABLE BLK L36MM CT-1 424H

## (undated) DEVICE — GLOVE SURG SZ 7.5 L11.2IN THK9.8MIL STRW LTX POLYMER BEAD

## (undated) DEVICE — SUTURE VCRL SZ 3-0 L27IN ABSRB UD L24MM FS-1 3/8 CIR REV J442H

## (undated) DEVICE — 40418 TRENDELENBURG ONE-STEP ARM PROTECTORS LARGE (1 PAIR): Brand: 40418 TRENDELENBURG ONE-STEP ARM PROTECTORS LARGE (1 PAIR)

## (undated) DEVICE — 6 FOOT DISPOSABLE EXTENSION CABLE WITH SAFE CONNECT / SCREW-DOWN

## (undated) DEVICE — AMPLATZ EXTRA STIFF WIRE GUIDE: Brand: AMPLATZ

## (undated) DEVICE — DRAPE,REIN 53X77,STERILE: Brand: MEDLINE

## (undated) DEVICE — ACCY PA100-A LEGEND LUB/DIFFUSER 4 PACK: Brand: MIDAS REX®

## (undated) DEVICE — SYRINGE MED 50ML LUERLOCK TIP

## (undated) DEVICE — SUTURE PROL SZ 4-0 L30IN NONABSORBABLE BLU SH-1 L22MM 1/2 8526H

## (undated) DEVICE — ELECTRODE BLDE L4IN NONINSULATED EDGE

## (undated) DEVICE — SUTURE ETHIBOND 2-0 V-5 D/A 30IN WHT X917

## (undated) DEVICE — DRAPE XR C ARM 41X74IN LF --

## (undated) DEVICE — BIPOLAR FORCEPS CORD: Brand: VALLEYLAB

## (undated) DEVICE — TOWEL,OR,DSP,ST,WHITE,DLX,XR,2/PK,40PK/C: Brand: MEDLINE

## (undated) DEVICE — SUTURE PERMA-HAND 0 L18IN NONABSORBABLE BLK CT-1 L36MM 1/2 C021D

## (undated) DEVICE — SUTURE VCRL SZ 1 L36IN ABSRB UD L36MM CT-1 1/2 CIR J947H

## (undated) DEVICE — STRAP,POSITIONING,KNEE/BODY,FOAM,4X60": Brand: MEDLINE

## (undated) DEVICE — STERILE POLYISOPRENE POWDER-FREE SURGICAL GLOVES WITH EMOLLIENT COATING: Brand: PROTEXIS

## (undated) DEVICE — SYR 10ML LUER LOK 1/5ML GRAD --

## (undated) DEVICE — NDL SPNE QNCKE 18GX3.5IN LF --

## (undated) DEVICE — PAD,ABDOMINAL,5"X9",ST,LF,25/BX: Brand: MEDLINE INDUSTRIES, INC.

## (undated) DEVICE — DRSG AQUACEL SURG 3.5X6IN -- CONVERT TO ITEM 369227

## (undated) DEVICE — Device: Brand: JELCO

## (undated) DEVICE — ADHESIVE SKIN CLOSURE XL 42 CM 2.7 CC MESH LIQUIBAND SECUR

## (undated) DEVICE — SYRINGE MED 20ML STD CLR PLAS LUERLOCK TIP N CTRL DISP

## (undated) DEVICE — SPONGE LAPAROTOMY W18XL18IN WHITE STRUNG RADIOPAQUE STERILE

## (undated) DEVICE — SUMP PERICARD AD 20FR WT TIP VERSATILE DSGN MULT PRT VENT

## (undated) DEVICE — BANDAGE COBAN 4 IN COMPR W4INXL5YD FOAM COHESIVE QUIK STK SELF ADH SFT

## (undated) DEVICE — CONNECTOR DRNGE W3/8-0.5XH3/16XL3/16IN 2:1 SIL CARD STR

## (undated) DEVICE — 3M™ BAIR HUGGER® CARDIAC ACCESS BLANKET, 5 PER CASE 63000: Brand: BAIR HUGGER™

## (undated) DEVICE — DRAIN,WOUND,ROUND,24FR,5/16",FULL-FLUTED: Brand: MEDLINE

## (undated) DEVICE — OPEN HEART A- RICHMOND: Brand: MEDLINE INDUSTRIES, INC.

## (undated) DEVICE — SYR 3ML LL TIP 1/10ML GRAD --

## (undated) DEVICE — SUTURE S STL SZ 6 L18IN NONABSORBABLE SIL L48MM V-40 1/2 M649G

## (undated) DEVICE — DRAIN SURG SGL COLL PT TB FOR ATS BG OASIS

## (undated) DEVICE — PROVE COVER: Brand: UNBRANDED

## (undated) DEVICE — PLEDGET SURG W3/16XL0.25IN THK1.65MM PTFE OVL FELT FOR THE

## (undated) DEVICE — BLADE,CARBON-STEEL,11,STRL,DISPOSABLE,TB: Brand: MEDLINE

## (undated) DEVICE — COVER,TABLE,HEAVY DUTY,60"X90",STRL: Brand: MEDLINE

## (undated) DEVICE — 1000ML,PRESSURE INFUSER W/STOPCOCK: Brand: MEDLINE

## (undated) DEVICE — SPONGE GZ W4XL4IN COT 12 PLY TYP VII WVN C FLD DSGN

## (undated) DEVICE — SYRINGE MED 10ML LUERLOCK TIP W/O SFTY DISP

## (undated) DEVICE — STERILE POLYISOPRENE POWDER-FREE SURGICAL GLOVES: Brand: PROTEXIS

## (undated) DEVICE — CATHETER IV 14GA L1.25IN TEF STR HUB INTROCAN SFTY

## (undated) DEVICE — COR-KNOT® QUICK LOAD® 6-POUCH: Brand: COR-KNOT® QUICK LOAD®

## (undated) DEVICE — (D)SOL MEDC ALC ISO 70% 16OZ -- CONVERT TO ITEM 364515

## (undated) DEVICE — CLIP LIG M BLU TI HRT SHP WIRE HORZ 600 PER BX

## (undated) DEVICE — SYR 5ML 1/5 GRAD LL NSAF LF --

## (undated) DEVICE — TEMP PACING WIRE: Brand: MYO/WIRE

## (undated) DEVICE — COR-KNOT MINI® COMBO KITBASE PACKAGE TYPE - KITEACH STERILE PACKAGE KIT CONTAINS (2) SINGLE PATIENT USE COR-KNOT MINI® DEVICES AND (12) COR-KNOT® QUICK LOADS®.: Brand: COR-KNOT MINI®

## (undated) DEVICE — CATHETER,URETHRAL,REDRUBBER,STRL,18FR: Brand: MEDLINE

## (undated) DEVICE — GLOVE SURG SZ 6 THK91MIL LTX FREE SYN POLYISOPRENE ANTI

## (undated) DEVICE — SUTURE MCRYL SZ 3-0 L27IN ABSRB UD L24MM PS-1 3/8 CIR PRIM Y936H

## (undated) DEVICE — HANDPIECE SET WITH COAXIAL HIGH FLOW TIP AND SUCTION TUBE: Brand: INTERPULSE

## (undated) DEVICE — CANNULA PERF 15FR L12.5IN RG STPCOCK WIREWOUND BODY

## (undated) DEVICE — MEDI-TRACE CADENCE ADULT, DEFIBRILLATION ELECTRODE -RTS  (10 PR/PK) - PHYSIO-CONTROL: Brand: MEDI-TRACE CADENCE

## (undated) DEVICE — GLOVE SURG SZ 75 CRM LTX FREE POLYISOPRENE POLYMER BEAD ANTI

## (undated) DEVICE — SUTURE PROL SZ 3-0 L30IN NONABSORBABLE BLU SH-1 L22MM 1/2 8762H

## (undated) DEVICE — SOLUTION IV 1000ML 140MEQ/L SOD 5MEQ/L K 3MEQ/L MG 27MEQ/L

## (undated) DEVICE — STOPCOCK IV 3W --

## (undated) DEVICE — SUTURE ETHBND EXCEL SZ 2 L30IN NONABSORBABLE GRN V-5 L17MM X937H

## (undated) DEVICE — SUTURE PROL SZ 4-0 L36IN NONABSORBABLE BLU L26MM SH 1/2 CIR 8521H

## (undated) DEVICE — APPLICATOR MEDICATED 26 CC SOLUTION HI LT ORNG CHLORAPREP

## (undated) DEVICE — ELECTRODE PT RET AD L9FT HI MOIST COND ADH HYDRGEL CORDED

## (undated) DEVICE — DRESSING FOAM W8.7XL9.1IN SAFETAC LAYR SELF ADH MEPILEX

## (undated) DEVICE — COVER,TABLE,60X90,STERILE: Brand: MEDLINE

## (undated) DEVICE — TUBING, SUCTION, 1/4" X 12', STRAIGHT: Brand: MEDLINE

## (undated) DEVICE — SUTURE ETHBND 2-0 30IN NONABSORB GRN WHT V-5 17MM 1/2 PXX52N

## (undated) DEVICE — WRAP SURG W1.31XL1.34M CARD FOR PT 165-172CM THERMOWRP

## (undated) DEVICE — SPONGE GZ W4XL4IN COT 12 PLY TYP VII WVN C FLD DSGN STERILE

## (undated) DEVICE — (D)PREP SKN CHLRAPRP APPL 26ML -- CONVERT TO ITEM 371833

## (undated) DEVICE — APPLICATOR BNDG 1MM ADH PREMIERPRO EXOFIN

## (undated) DEVICE — SUTURE TICRON DBL ARMED 2 0 CV 305 42IN BLU N ABSRB BRAID 8886303551

## (undated) DEVICE — SENSOR TEMP SKIN DISP

## (undated) DEVICE — GLOVE SURG SZ 65 L12IN FNGR THK94MIL STD WHT LTX FREE

## (undated) DEVICE — LAMINECTOMY RICHMOND-LF: Brand: MEDLINE INDUSTRIES, INC.

## (undated) DEVICE — SOLUTION IRRIG 3000ML 0.9% SOD CHL FLX CONT 0797208] ICU MEDICAL INC]

## (undated) DEVICE — TIP CLEANER: Brand: VALLEYLAB

## (undated) DEVICE — SUTURE VCRL 0 L27IN ABSRB UD CT L40MM 1/2 CIR TAPERPOINT J280H

## (undated) DEVICE — GLUE TISS 10ML PLAS STD SPRD TIP SYR PLUNG PREFIL SKIN CLSR 5PK

## (undated) DEVICE — SOLUTION IV 1000ML 0.9% SOD CHL PH 5 INJ USP VIAFLX PLAS

## (undated) DEVICE — NEEDLE HYPO 22GA L1.5IN BLK S STL HUB POLYPR SHLD REG BVL

## (undated) DEVICE — 72" ARTERIAL PRESSURE TUBING: Brand: ICU MEDICAL

## (undated) DEVICE — LEAD PACE L475MM CHNL A OR V MYOCARDIAL STEROID ELUT SIL

## (undated) DEVICE — DRESSING SIL W4XL5IN ANTIBACT GELLING FBR CYTOFORM

## (undated) DEVICE — SUTURE VCRL SZ 2-0 L27IN ABSRB UD L26MM CT-2 1/2 CIR J269H

## (undated) DEVICE — FLOSEAL HEMOSTATIC MATRIX, 10 ML: Brand: FLOSEAL

## (undated) DEVICE — JELLY LUBRICATING 2.7GM H2O SOL GREASELESS E-Z

## (undated) DEVICE — NEEDLE HYPO 18GA L1.5IN PNK S STL HUB POLYPR SHLD REG BVL

## (undated) DEVICE — SPONGE GZ W4XL4IN COT RADPQ HIGHLY ABSRB

## (undated) DEVICE — RELOAD STPL H1-2.5XL35MM VASC THN TISS WHT B FRM NAT ARTC

## (undated) DEVICE — LUER-LOK 360°: Brand: CONNECTA, LUER-LOK

## (undated) DEVICE — PLEDGET SUT SFT OVL 3 8X5 16IN

## (undated) DEVICE — LOOP,VESSEL,MAXI,BLUE,2/PK,STERILE: Brand: MEDLINE

## (undated) DEVICE — DRAPE SURG W41XL74IN CLR FULL SZ C ARM 3 ADH POLY STRP E

## (undated) DEVICE — PUMP BLD CENTRIMAG

## (undated) DEVICE — PRESSURE MONITORING SET: Brand: TRUWAVE

## (undated) DEVICE — CANNULA AORT ROOT INTRO STD TIP 5FR OVERALL LEN 55IN DLP

## (undated) DEVICE — CONTAINER,SPECIMEN,3OZ,OR STRL: Brand: MEDLINE

## (undated) DEVICE — STAPLER SKIN POWERED L320MM 35MM VASC TISS 12 FIRING B FRM

## (undated) DEVICE — DRAPE,UTILTY,TAPE,15X26, 4EA/PK: Brand: MEDLINE

## (undated) DEVICE — GOWN,SIRUS,NONRNF,SETINSLV,XL,20/CS: Brand: MEDLINE

## (undated) DEVICE — 6 INCH MONITORING EXTENSION SET: Brand: ICU MEDICAL